# Patient Record
Sex: FEMALE | Race: WHITE | URBAN - METROPOLITAN AREA
[De-identification: names, ages, dates, MRNs, and addresses within clinical notes are randomized per-mention and may not be internally consistent; named-entity substitution may affect disease eponyms.]

---

## 2018-12-08 ENCOUNTER — OFFICE VISIT (OUTPATIENT)
Dept: FAMILY MEDICINE CLINIC | Age: 68
End: 2018-12-08
Payer: MEDICARE

## 2018-12-08 VITALS
WEIGHT: 126 LBS | TEMPERATURE: 97.5 F | HEART RATE: 68 BPM | BODY MASS INDEX: 21.51 KG/M2 | RESPIRATION RATE: 16 BRPM | SYSTOLIC BLOOD PRESSURE: 138 MMHG | HEIGHT: 64 IN | DIASTOLIC BLOOD PRESSURE: 62 MMHG | OXYGEN SATURATION: 98 %

## 2018-12-08 DIAGNOSIS — N30.01 ACUTE CYSTITIS WITH HEMATURIA: ICD-10-CM

## 2018-12-08 DIAGNOSIS — N30.01 ACUTE CYSTITIS WITH HEMATURIA: Primary | ICD-10-CM

## 2018-12-08 LAB
BILIRUBIN, POC: ABNORMAL
BLOOD URINE, POC: ABNORMAL
CLARITY, POC: CLEAR
COLOR, POC: YELLOW
GLUCOSE URINE, POC: ABNORMAL
KETONES, POC: ABNORMAL
LEUKOCYTE EST, POC: ABNORMAL
NITRITE, POC: ABNORMAL
PH, POC: 6
PROTEIN, POC: ABNORMAL
SPECIFIC GRAVITY, POC: 1.03
UROBILINOGEN, POC: ABNORMAL

## 2018-12-08 PROCEDURE — 99213 OFFICE O/P EST LOW 20 MIN: CPT | Performed by: FAMILY MEDICINE

## 2018-12-08 PROCEDURE — 81003 URINALYSIS AUTO W/O SCOPE: CPT | Performed by: FAMILY MEDICINE

## 2018-12-08 RX ORDER — ACETAMINOPHEN 500 MG
500 TABLET ORAL
COMMUNITY

## 2018-12-08 RX ORDER — SULFAMETHOXAZOLE AND TRIMETHOPRIM 800; 160 MG/1; MG/1
1 TABLET ORAL 2 TIMES DAILY
Qty: 10 TABLET | Refills: 0 | Status: SHIPPED | OUTPATIENT
Start: 2018-12-08 | End: 2018-12-13

## 2018-12-08 RX ORDER — ASCORBATE CALCIUM 500 MG
500 TABLET ORAL
COMMUNITY

## 2018-12-08 RX ORDER — CALCIUM POLYCARBOPHIL 625 MG 625 MG/1
625 TABLET ORAL
COMMUNITY
Start: 2018-09-25

## 2018-12-08 RX ORDER — LOSARTAN POTASSIUM 25 MG/1
25 TABLET ORAL
COMMUNITY
Start: 2018-10-24

## 2018-12-08 RX ORDER — RANITIDINE 150 MG/1
150 TABLET ORAL
COMMUNITY
Start: 2018-10-24 | End: 2018-12-08 | Stop reason: ALTCHOICE

## 2018-12-08 RX ORDER — PHENAZOPYRIDINE HYDROCHLORIDE 100 MG/1
100 TABLET, FILM COATED ORAL 3 TIMES DAILY PRN
Qty: 6 TABLET | Refills: 0 | Status: SHIPPED | OUTPATIENT
Start: 2018-12-08 | End: 2019-12-08

## 2018-12-08 RX ORDER — CHOLECALCIFEROL (VITAMIN D3) 125 MCG
5 CAPSULE ORAL
COMMUNITY

## 2018-12-10 LAB
ORGANISM: ABNORMAL
URINE CULTURE, ROUTINE: ABNORMAL
URINE CULTURE, ROUTINE: ABNORMAL

## 2023-10-15 ENCOUNTER — APPOINTMENT (OUTPATIENT)
Dept: CT IMAGING | Age: 73
End: 2023-10-15
Attending: EMERGENCY MEDICINE
Payer: MEDICARE

## 2023-10-15 ENCOUNTER — APPOINTMENT (OUTPATIENT)
Dept: GENERAL RADIOLOGY | Age: 73
End: 2023-10-15
Payer: MEDICARE

## 2023-10-15 ENCOUNTER — HOSPITAL ENCOUNTER (OUTPATIENT)
Age: 73
Setting detail: OBSERVATION
Discharge: HOME HEALTH | End: 2023-10-16
Attending: EMERGENCY MEDICINE | Admitting: INTERNAL MEDICINE
Payer: MEDICARE

## 2023-10-15 DIAGNOSIS — R07.9 CHEST PAIN, UNSPECIFIED TYPE: Primary | ICD-10-CM

## 2023-10-15 LAB
ALBUMIN SERPL-MCNC: 4.9 G/DL (ref 3.5–4.6)
ALP SERPL-CCNC: 97 U/L (ref 40–130)
ALT SERPL-CCNC: 17 U/L (ref 0–33)
ANION GAP SERPL CALCULATED.3IONS-SCNC: 10 MEQ/L (ref 9–15)
APTT PPP: 26.1 SEC (ref 24.4–36.8)
AST SERPL-CCNC: 28 U/L (ref 0–35)
BASOPHILS # BLD: 0 K/UL (ref 0–0.2)
BASOPHILS NFR BLD: 0.3 %
BILIRUB SERPL-MCNC: 0.8 MG/DL (ref 0.2–0.7)
BUN SERPL-MCNC: 14 MG/DL (ref 8–23)
CALCIUM SERPL-MCNC: 9.6 MG/DL (ref 8.5–9.9)
CHLORIDE SERPL-SCNC: 102 MEQ/L (ref 95–107)
CK SERPL-CCNC: 100 U/L (ref 0–170)
CO2 SERPL-SCNC: 27 MEQ/L (ref 20–31)
CREAT SERPL-MCNC: 0.54 MG/DL (ref 0.5–0.9)
D DIMER PPP FEU-MCNC: 0.63 MG/L FEU (ref 0–0.5)
EOSINOPHIL # BLD: 0.1 K/UL (ref 0–0.7)
EOSINOPHIL NFR BLD: 1.7 %
ERYTHROCYTE [DISTWIDTH] IN BLOOD BY AUTOMATED COUNT: 14 % (ref 11.5–14.5)
GLOBULIN SER CALC-MCNC: 2.7 G/DL (ref 2.3–3.5)
GLUCOSE SERPL-MCNC: 133 MG/DL (ref 70–99)
HCT VFR BLD AUTO: 41.9 % (ref 37–47)
HGB BLD-MCNC: 13.3 G/DL (ref 12–16)
INR PPP: 1
LYMPHOCYTES # BLD: 2.4 K/UL (ref 1–4.8)
LYMPHOCYTES NFR BLD: 40.6 %
MCH RBC QN AUTO: 30 PG (ref 27–31.3)
MCHC RBC AUTO-ENTMCNC: 31.7 % (ref 33–37)
MCV RBC AUTO: 94.6 FL (ref 79.4–94.8)
MONOCYTES # BLD: 0.7 K/UL (ref 0.2–0.8)
MONOCYTES NFR BLD: 11.5 %
NEUTROPHILS # BLD: 2.7 K/UL (ref 1.4–6.5)
NEUTS SEG NFR BLD: 45.6 %
PLATELET # BLD AUTO: 235 K/UL (ref 130–400)
POTASSIUM SERPL-SCNC: 3.3 MEQ/L (ref 3.4–4.9)
PROT SERPL-MCNC: 7.6 G/DL (ref 6.3–8)
PROTHROMBIN TIME: 13.2 SEC (ref 12.3–14.9)
RBC # BLD AUTO: 4.43 M/UL (ref 4.2–5.4)
SODIUM SERPL-SCNC: 139 MEQ/L (ref 135–144)
TROPONIN, HIGH SENSITIVITY: 10 NG/L (ref 0–19)
WBC # BLD AUTO: 5.8 K/UL (ref 4.8–10.8)

## 2023-10-15 PROCEDURE — 80053 COMPREHEN METABOLIC PANEL: CPT

## 2023-10-15 PROCEDURE — 71045 X-RAY EXAM CHEST 1 VIEW: CPT

## 2023-10-15 PROCEDURE — 85379 FIBRIN DEGRADATION QUANT: CPT

## 2023-10-15 PROCEDURE — 6360000004 HC RX CONTRAST MEDICATION: Performed by: EMERGENCY MEDICINE

## 2023-10-15 PROCEDURE — G0378 HOSPITAL OBSERVATION PER HR: HCPCS

## 2023-10-15 PROCEDURE — 71275 CT ANGIOGRAPHY CHEST: CPT

## 2023-10-15 PROCEDURE — 85730 THROMBOPLASTIN TIME PARTIAL: CPT

## 2023-10-15 PROCEDURE — 85610 PROTHROMBIN TIME: CPT

## 2023-10-15 PROCEDURE — 6370000000 HC RX 637 (ALT 250 FOR IP): Performed by: INTERNAL MEDICINE

## 2023-10-15 PROCEDURE — 99285 EMERGENCY DEPT VISIT HI MDM: CPT

## 2023-10-15 PROCEDURE — 82550 ASSAY OF CK (CPK): CPT

## 2023-10-15 PROCEDURE — 99223 1ST HOSP IP/OBS HIGH 75: CPT | Performed by: INTERNAL MEDICINE

## 2023-10-15 PROCEDURE — 93005 ELECTROCARDIOGRAM TRACING: CPT | Performed by: EMERGENCY MEDICINE

## 2023-10-15 PROCEDURE — 85025 COMPLETE CBC W/AUTO DIFF WBC: CPT

## 2023-10-15 PROCEDURE — 84484 ASSAY OF TROPONIN QUANT: CPT

## 2023-10-15 PROCEDURE — 36415 COLL VENOUS BLD VENIPUNCTURE: CPT

## 2023-10-15 PROCEDURE — 6370000000 HC RX 637 (ALT 250 FOR IP): Performed by: EMERGENCY MEDICINE

## 2023-10-15 RX ORDER — ASPIRIN 81 MG/1
81 TABLET, CHEWABLE ORAL DAILY
Status: CANCELLED | OUTPATIENT
Start: 2023-10-16

## 2023-10-15 RX ORDER — LOSARTAN POTASSIUM 25 MG/1
25 TABLET ORAL DAILY
Status: DISCONTINUED | OUTPATIENT
Start: 2023-10-16 | End: 2023-10-16 | Stop reason: HOSPADM

## 2023-10-15 RX ORDER — ATORVASTATIN CALCIUM 10 MG/1
10 TABLET, FILM COATED ORAL NIGHTLY
Status: DISCONTINUED | OUTPATIENT
Start: 2023-10-15 | End: 2023-10-16 | Stop reason: HOSPADM

## 2023-10-15 RX ORDER — ASPIRIN 81 MG/1
81 TABLET ORAL DAILY
Status: DISCONTINUED | OUTPATIENT
Start: 2023-10-15 | End: 2023-10-16 | Stop reason: HOSPADM

## 2023-10-15 RX ORDER — NITROGLYCERIN 0.4 MG/1
0.4 TABLET SUBLINGUAL EVERY 5 MIN PRN
Status: CANCELLED | OUTPATIENT
Start: 2023-10-15

## 2023-10-15 RX ORDER — ASPIRIN 81 MG/1
81 TABLET, CHEWABLE ORAL ONCE
Status: COMPLETED | OUTPATIENT
Start: 2023-10-15 | End: 2023-10-15

## 2023-10-15 RX ORDER — ATORVASTATIN CALCIUM 80 MG/1
80 TABLET, FILM COATED ORAL NIGHTLY
Status: CANCELLED | OUTPATIENT
Start: 2023-10-15

## 2023-10-15 RX ORDER — ONDANSETRON 4 MG/1
4 TABLET, ORALLY DISINTEGRATING ORAL EVERY 8 HOURS PRN
Status: CANCELLED | OUTPATIENT
Start: 2023-10-15

## 2023-10-15 RX ORDER — ASCORBIC ACID 500 MG
500 TABLET ORAL DAILY
COMMUNITY

## 2023-10-15 RX ORDER — ONDANSETRON 2 MG/ML
4 INJECTION INTRAMUSCULAR; INTRAVENOUS EVERY 6 HOURS PRN
Status: CANCELLED | OUTPATIENT
Start: 2023-10-15

## 2023-10-15 RX ORDER — SODIUM CHLORIDE 0.9 % (FLUSH) 0.9 %
5-40 SYRINGE (ML) INJECTION PRN
Status: CANCELLED | OUTPATIENT
Start: 2023-10-15

## 2023-10-15 RX ORDER — SODIUM CHLORIDE 0.9 % (FLUSH) 0.9 %
5-40 SYRINGE (ML) INJECTION EVERY 12 HOURS SCHEDULED
Status: CANCELLED | OUTPATIENT
Start: 2023-10-15

## 2023-10-15 RX ORDER — ASCORBATE CALCIUM 500 MG
500 TABLET ORAL DAILY
Status: DISCONTINUED | OUTPATIENT
Start: 2023-10-15 | End: 2023-10-16 | Stop reason: HOSPADM

## 2023-10-15 RX ORDER — ENOXAPARIN SODIUM 100 MG/ML
40 INJECTION SUBCUTANEOUS DAILY
Status: CANCELLED | OUTPATIENT
Start: 2023-10-15

## 2023-10-15 RX ORDER — ACETAMINOPHEN 325 MG/1
650 TABLET ORAL EVERY 6 HOURS PRN
Status: CANCELLED | OUTPATIENT
Start: 2023-10-15

## 2023-10-15 RX ORDER — ACETAMINOPHEN 650 MG/1
650 SUPPOSITORY RECTAL EVERY 6 HOURS PRN
Status: CANCELLED | OUTPATIENT
Start: 2023-10-15

## 2023-10-15 RX ORDER — POLYETHYLENE GLYCOL 3350 17 G/17G
17 POWDER, FOR SOLUTION ORAL DAILY PRN
Status: CANCELLED | OUTPATIENT
Start: 2023-10-15

## 2023-10-15 RX ORDER — ACETAMINOPHEN 325 MG/1
650 TABLET ORAL EVERY 6 HOURS PRN
Status: DISCONTINUED | OUTPATIENT
Start: 2023-10-15 | End: 2023-10-16 | Stop reason: HOSPADM

## 2023-10-15 RX ORDER — ASCORBIC ACID 500 MG
500 TABLET ORAL DAILY
Status: DISCONTINUED | OUTPATIENT
Start: 2023-10-15 | End: 2023-10-16 | Stop reason: HOSPADM

## 2023-10-15 RX ORDER — MECOBALAMIN 5000 MCG
5 TABLET,DISINTEGRATING ORAL NIGHTLY
Status: DISCONTINUED | OUTPATIENT
Start: 2023-10-15 | End: 2023-10-16 | Stop reason: HOSPADM

## 2023-10-15 RX ORDER — SODIUM CHLORIDE 9 MG/ML
INJECTION, SOLUTION INTRAVENOUS PRN
Status: CANCELLED | OUTPATIENT
Start: 2023-10-15

## 2023-10-15 RX ADMIN — IOPAMIDOL 75 ML: 612 INJECTION, SOLUTION INTRAVENOUS at 16:28

## 2023-10-15 RX ADMIN — ASPIRIN 81 MG: 81 TABLET, CHEWABLE ORAL at 14:39

## 2023-10-15 RX ADMIN — ATORVASTATIN CALCIUM 10 MG: 10 TABLET, FILM COATED ORAL at 22:16

## 2023-10-15 RX ADMIN — NITROGLYCERIN 0.5 INCH: 20 OINTMENT TOPICAL at 14:39

## 2023-10-15 RX ADMIN — Medication 5 MG: at 22:16

## 2023-10-15 RX ADMIN — ASPIRIN 81 MG: 81 TABLET, COATED ORAL at 22:16

## 2023-10-15 ASSESSMENT — PAIN - FUNCTIONAL ASSESSMENT
PAIN_FUNCTIONAL_ASSESSMENT: NONE - DENIES PAIN
PAIN_FUNCTIONAL_ASSESSMENT: 0-10

## 2023-10-15 ASSESSMENT — LIFESTYLE VARIABLES
HOW OFTEN DO YOU HAVE A DRINK CONTAINING ALCOHOL: 2-4 TIMES A MONTH
HOW MANY STANDARD DRINKS CONTAINING ALCOHOL DO YOU HAVE ON A TYPICAL DAY: 3 OR 4
HOW MANY STANDARD DRINKS CONTAINING ALCOHOL DO YOU HAVE ON A TYPICAL DAY: 3 OR 4
HOW OFTEN DO YOU HAVE A DRINK CONTAINING ALCOHOL: 2-4 TIMES A MONTH

## 2023-10-15 ASSESSMENT — ENCOUNTER SYMPTOMS
NAUSEA: 0
ABDOMINAL PAIN: 0
SHORTNESS OF BREATH: 0
RHINORRHEA: 0
ALLERGIC/IMMUNOLOGIC NEGATIVE: 1
VOMITING: 0
CHEST TIGHTNESS: 1
STRIDOR: 0
EYES NEGATIVE: 1
WHEEZING: 0

## 2023-10-15 ASSESSMENT — PAIN DESCRIPTION - DESCRIPTORS: DESCRIPTORS: HEAVINESS

## 2023-10-15 ASSESSMENT — PAIN DESCRIPTION - LOCATION: LOCATION: CHEST

## 2023-10-15 ASSESSMENT — PAIN SCALES - GENERAL
PAINLEVEL_OUTOF10: 5
PAINLEVEL_OUTOF10: 0

## 2023-10-15 NOTE — FLOWSHEET NOTE
68 yr old female received from ER with dx CP. Pt denies pain at this time. She is alert and oriented, follows commands, responds appropriately. Respirations even and nonlabored on RA. Admission completed without problem. Primary service in to see pt.

## 2023-10-15 NOTE — ED PROVIDER NOTES
[facility] ED  eMERGENCY dEPARTMENT eNCOUnter      Pt Name: Kathi Huffman  MRN: [id number]  [address] 1950  Date of evaluation: 10/15/2023  Provider: Teodoro Florentino MD    CHIEF COMPLAINT       Chief Complaint   Patient presents with    Chest Pain     Heaviness that started a few hours ago         HISTORY OF PRESENT ILLNESS   (Location/Symptom, Timing/Onset,Context/Setting, Quality, Duration, Modifying Factors, Severity)  Note limiting factors. Kathi Huffman is a 68 y.o. female who presents to the emergency department as a general lysed chest heaviness. Patient admits she seemed to notice it a few days ago and did mention it to her primary care physician. Patient admits that she is slated for stress testing next month. Patient admits however today a few hours ago she began with significant increased chest pressure and generalized heaviness. Patient denies that it is pain. Patient denies radiation. Patient denies shortness of breath, nausea or vomiting at this time. There is been no recent fevers or chills or other constitutional symptomatologies. HPI    NursingNotes were reviewed. REVIEW OF SYSTEMS    (2-9 systems for level 4, 10 or more for level 5)     Review of Systems   Constitutional:  Negative for activity change, chills and fever. HENT:  Negative for congestion, ear pain and rhinorrhea. Eyes: Negative. Respiratory:  Positive for chest tightness. Negative for shortness of breath, wheezing and stridor. Cardiovascular:  Positive for chest pain. Negative for leg swelling. Gastrointestinal:  Negative for abdominal pain, nausea and vomiting. Endocrine: Negative. Genitourinary:  Negative for dysuria and frequency. Musculoskeletal:  Negative for gait problem and neck pain. Skin: Negative. Allergic/Immunologic: Negative. Neurological:  Negative for seizures, syncope and light-headedness. Hematological: Negative.         Except as noted

## 2023-10-15 NOTE — ED NOTES
Page to [facility] @ 1282  Dr Flo Gracia returned call @ 025 [address] [place], [place]  10/15/23 3845

## 2023-10-15 NOTE — ED TRIAGE NOTES
Cp that started a few hours ago (heaviness)  Pt states her pain is a 5/10  Pt has a steady gait   Pt is afebrile

## 2023-10-15 NOTE — ED NOTES
Critical taken D-Dimer elevated 0.63  Dr. Donavon Morales aware      Toribio Calderon, ELIUD  10/15/23 5394

## 2023-10-16 ENCOUNTER — APPOINTMENT (OUTPATIENT)
Dept: NUCLEAR MEDICINE | Age: 73
End: 2023-10-16
Payer: MEDICARE

## 2023-10-16 ENCOUNTER — APPOINTMENT (OUTPATIENT)
Age: 73
End: 2023-10-16
Payer: MEDICARE

## 2023-10-16 VITALS
RESPIRATION RATE: 20 BRPM | OXYGEN SATURATION: 98 % | WEIGHT: 117 LBS | TEMPERATURE: 97.7 F | BODY MASS INDEX: 20.73 KG/M2 | HEIGHT: 63 IN | DIASTOLIC BLOOD PRESSURE: 71 MMHG | SYSTOLIC BLOOD PRESSURE: 158 MMHG | HEART RATE: 63 BPM

## 2023-10-16 PROBLEM — R07.2 PRECORDIAL CHEST PAIN: Status: ACTIVE | Noted: 2023-10-15

## 2023-10-16 LAB
ECHO BSA: 1.54 M2
EKG ATRIAL RATE: 74 BPM
EKG P AXIS: 82 DEGREES
EKG P-R INTERVAL: 168 MS
EKG Q-T INTERVAL: 412 MS
EKG QRS DURATION: 90 MS
EKG QTC CALCULATION (BAZETT): 457 MS
EKG R AXIS: 48 DEGREES
EKG T AXIS: 61 DEGREES
EKG VENTRICULAR RATE: 74 BPM
NUC STRESS EJECTION FRACTION: 74 %
STRESS BASELINE DIAS BP: 79 MMHG
STRESS BASELINE HR: 75 BPM
STRESS BASELINE ST DEPRESSION: 0 MM
STRESS BASELINE SYS BP: 170 MMHG
STRESS ESTIMATED WORKLOAD: 1 METS
STRESS PEAK DIAS BP: 82 MMHG
STRESS PEAK SYS BP: 174 MMHG
STRESS PERCENT HR ACHIEVED: 68 %
STRESS POST PEAK HR: 100 BPM
STRESS RATE PRESSURE PRODUCT: NORMAL BPM*MMHG
STRESS ST DEPRESSION: 0 MM
STRESS TARGET HR: 148 BPM
TID: 1.17

## 2023-10-16 PROCEDURE — 2580000003 HC RX 258: Performed by: INTERNAL MEDICINE

## 2023-10-16 PROCEDURE — 6370000000 HC RX 637 (ALT 250 FOR IP): Performed by: INTERNAL MEDICINE

## 2023-10-16 PROCEDURE — APPSS30 APP SPLIT SHARED TIME 16-30 MINUTES: Performed by: PHYSICIAN ASSISTANT

## 2023-10-16 PROCEDURE — G0378 HOSPITAL OBSERVATION PER HR: HCPCS

## 2023-10-16 PROCEDURE — 3430000000 HC RX DIAGNOSTIC RADIOPHARMACEUTICAL: Performed by: INTERNAL MEDICINE

## 2023-10-16 PROCEDURE — A9502 TC99M TETROFOSMIN: HCPCS | Performed by: INTERNAL MEDICINE

## 2023-10-16 PROCEDURE — 6360000002 HC RX W HCPCS: Performed by: INTERNAL MEDICINE

## 2023-10-16 PROCEDURE — 93010 ELECTROCARDIOGRAM REPORT: CPT | Performed by: INTERNAL MEDICINE

## 2023-10-16 PROCEDURE — 93017 CV STRESS TEST TRACING ONLY: CPT

## 2023-10-16 PROCEDURE — 99239 HOSP IP/OBS DSCHRG MGMT >30: CPT | Performed by: INTERNAL MEDICINE

## 2023-10-16 PROCEDURE — 78452 HT MUSCLE IMAGE SPECT MULT: CPT

## 2023-10-16 PROCEDURE — 93018 CV STRESS TEST I&R ONLY: CPT | Performed by: INTERNAL MEDICINE

## 2023-10-16 RX ORDER — SODIUM CHLORIDE 0.9 % (FLUSH) 0.9 %
10 SYRINGE (ML) INJECTION PRN
Status: DISCONTINUED | OUTPATIENT
Start: 2023-10-16 | End: 2023-10-16 | Stop reason: HOSPADM

## 2023-10-16 RX ORDER — ASPIRIN 81 MG/1
81 TABLET ORAL DAILY
Qty: 30 TABLET | Refills: 3 | Status: SHIPPED | OUTPATIENT
Start: 2023-10-17

## 2023-10-16 RX ORDER — REGADENOSON 0.08 MG/ML
0.4 INJECTION, SOLUTION INTRAVENOUS
Status: COMPLETED | OUTPATIENT
Start: 2023-10-16 | End: 2023-10-16

## 2023-10-16 RX ADMIN — LOSARTAN POTASSIUM 25 MG: 25 TABLET, FILM COATED ORAL at 11:58

## 2023-10-16 RX ADMIN — TETROFOSMIN 11.4 MILLICURIE: 1.38 INJECTION, POWDER, LYOPHILIZED, FOR SOLUTION INTRAVENOUS at 08:35

## 2023-10-16 RX ADMIN — OXYCODONE HYDROCHLORIDE AND ACETAMINOPHEN 500 MG: 500 TABLET ORAL at 11:58

## 2023-10-16 RX ADMIN — Medication 10 ML: at 08:35

## 2023-10-16 RX ADMIN — ASPIRIN 81 MG: 81 TABLET, COATED ORAL at 11:58

## 2023-10-16 RX ADMIN — TETROFOSMIN 35 MILLICURIE: 1.38 INJECTION, POWDER, LYOPHILIZED, FOR SOLUTION INTRAVENOUS at 09:25

## 2023-10-16 RX ADMIN — REGADENOSON 0.4 MG: 0.08 INJECTION, SOLUTION INTRAVENOUS at 09:25

## 2023-10-16 RX ADMIN — Medication 10 ML: at 09:25

## 2023-10-16 RX ADMIN — Medication 10 ML: at 09:26

## 2023-10-16 ASSESSMENT — PAIN SCALES - GENERAL: PAINLEVEL_OUTOF10: 0

## 2023-10-16 NOTE — PLAN OF CARE
Problem: Discharge Planning  Goal: Discharge to home or other facility with appropriate resources  Outcome: Progressing  Flowsheets (Taken 10/15/2023 1717 by Elspeth Kayser, RN)  Discharge to home or other facility with appropriate resources: Identify barriers to discharge with patient and caregiver     Problem: Safety - Adult  Goal: Free from fall injury  Outcome: Progressing     Problem: Pain  Goal: Verbalizes/displays adequate comfort level or baseline comfort level  Outcome: Progressing  Flowsheets (Taken 10/16/2023 0431)  Verbalizes/displays adequate comfort level or baseline comfort level:   Encourage patient to monitor pain and request assistance   Assess pain using appropriate pain scale   Administer analgesics based on type and severity of pain and evaluate response     Problem: Skin/Tissue Integrity  Goal: Absence of new skin breakdown  Description: 1. Monitor for areas of redness and/or skin breakdown  2. Assess vascular access sites hourly  3. Every 4-6 hours minimum:  Change oxygen saturation probe site  4. Every 4-6 hours:  If on nasal continuous positive airway pressure, respiratory therapy assess nares and determine need for appliance change or resting period.   Outcome: Progressing

## 2023-10-16 NOTE — DISCHARGE SUMMARY
Stress Systolic  mmHg    Stress Diastolic BP 82 mmHg    Stress Peak  BPM    Stress Estimated Workload 1.0 METS    Stress Percent HR Achieved 68 %    Stress Rate Pressure Product 17,400 BPM*mmHg    Body Surface Area 1.54 m2         Telemetry 10/16/23: SR 70s    Follow-up visits:   Dylan Sandoval DO  [address] [address]  [phone]    Schedule an appointment as soon as possible for a visit in 4 week(s)  Call to schedule cardiology follow-up       Assessment:  Active Hospital Problems    Diagnosis Date Noted    Chest pain [R07.9] 10/15/2023     Precordial pain/chest heaviness  Abnormal ECG  Hypertension  Hyperlipidemia  Family history of heart disease  Remote history of tobacco abuse    Plan:   1. Await result of stress test today. If negative, plan to discharge home with outpatient follow-up  2. Continue current medications  3. Cardiac diet recommended  4.   Follow-up with cardiology, Dr. Addison Ferguson in 4 weeks upon discharge        Electronically signed by Yobany Ashby 10/16/2023 at 3:19 PM

## 2023-10-16 NOTE — H&P
ECG  Hypertension  Hyperlipidemia  Family history of heart disease  Remote history of tobacco abuse    Plan:   Admit to observation  Trend high-sensitivity troponin, negative x2. Aspirin 81 mg daily. Home medications. Lexiscan Myoview stress test tomorrow. If negative, she can be discharged home. If abnormal, then cardiac catheterization is warranted.       Candy Sharif DO, Catherine Boot

## 2023-10-16 NOTE — CARE COORDINATION
Case Management Assessment  Initial Evaluation    Date/Time of Evaluation: 10/16/2023 4:22 PM  Assessment Completed by: Shawanda Perez RN    If patient is discharged prior to next notation, then this note serves as note for discharge by case management. Patient Name: Kelli Koenig                   YOB: 1950  Diagnosis: Chest pain [R07.9]  Chest pain, unspecified type [R07.9]                   Date / Time: 10/15/2023  2:06 PM    Patient Admission Status: Observation   Readmission Risk (Low < 19, Mod (19-27), High > 27): No data recorded  Current PCP: Nico Swain, DO  PCP verified by CM? Yes    Chart Reviewed: Yes      History Provided by: Medical Record  Patient Orientation: Alert and Oriented, Person, Place, Situation, Self    Patient Cognition: Alert    Hospitalization in the last 30 days (Readmission):  No    If yes, Readmission Assessment in  Navigator will be completed. Advance Directives:      Code Status: Not on file   Patient's Primary Decision Maker is: Legal Next of Kin    Primary Decision MakerPatrkarthik Tillman - Daughter-in-Law - [phone]    Discharge Planning:    Patient lives with: Alone Type of Home: House  Primary Care Giver: Self  Patient Support Systems include: Children   Current Financial resources:    Current community resources:    Current services prior to admission: None            Current DME:              Type of Home Care services:  None    ADLS  Prior functional level: Independent in ADLs/IADLs  Current functional level: Independent in ADLs/IADLs    PT AM-PAC:   /24  OT AM-PAC:   /24    Family can provide assistance at DC: Yes  Would you like Case Management to discuss the discharge plan with any other family members/significant others, and if so, who?  No  Plans to Return to Present Housing: Yes  Other Identified Issues/Barriers to RETURNING to current housing:   Potential Assistance needed at discharge: N/A            Potential DME:    Patient

## 2024-09-10 NOTE — PROGRESS NOTES
patient's medical history in detail and updated the computerized patient record. More than 50% of the appointment was spent in face-to-face counseling, education and care coordination. Please note this report has been partially produced using speech recognition software and may contain mistakes related to that system including errors in grammar, punctuation and spelling as well as words and phrases that may seem inappropriate. If there are questions or concerns, please feel free to contact me to clarify. Orders Placed This Encounter   Procedures    POCT Urinalysis No Micro (Auto)     No orders of the defined types were placed in this encounter. Medications Discontinued During This Encounter   Medication Reason    ranitidine (ZANTAC) 150 MG tablet Therapy completed    ciclopirox (PENLAC) 8 % solution Therapy completed     No Follow-up on file. Controlled Substances Monitoring:     No flowsheet data found.     Suman Rodriguez MD [[id number]]

## 2024-11-18 ENCOUNTER — APPOINTMENT (OUTPATIENT)
Dept: CT IMAGING | Age: 74
End: 2024-11-18
Payer: MEDICARE

## 2024-11-18 ENCOUNTER — HOSPITAL ENCOUNTER (INPATIENT)
Age: 74
LOS: 4 days | Discharge: NURSING, REHAB OR LONG-TERM CARE | End: 2024-11-22
Attending: EMERGENCY MEDICINE | Admitting: INTERNAL MEDICINE
Payer: MEDICARE

## 2024-11-18 ENCOUNTER — APPOINTMENT (OUTPATIENT)
Dept: GENERAL RADIOLOGY | Age: 74
End: 2024-11-18
Payer: MEDICARE

## 2024-11-18 DIAGNOSIS — R29.898 RIGHT LEG WEAKNESS: ICD-10-CM

## 2024-11-18 DIAGNOSIS — I61.9 HEMORRHAGIC CEREBROVASCULAR ACCIDENT (CVA) (HCC): Primary | ICD-10-CM

## 2024-11-18 PROBLEM — I62.9 INTRACRANIAL HEMORRHAGE (HCC): Status: ACTIVE | Noted: 2024-11-18

## 2024-11-18 LAB
ALBUMIN SERPL-MCNC: 5 G/DL (ref 3.5–4.6)
ALP SERPL-CCNC: 100 U/L (ref 40–130)
ALT SERPL-CCNC: 31 U/L (ref 0–33)
ANION GAP SERPL CALCULATED.3IONS-SCNC: 15 MEQ/L (ref 9–15)
APTT PPP: 24.8 SEC (ref 24.4–36.8)
AST SERPL-CCNC: 48 U/L (ref 0–35)
BASOPHILS # BLD: 0 K/UL (ref 0–0.2)
BASOPHILS NFR BLD: 0.1 %
BILIRUB SERPL-MCNC: 0.6 MG/DL (ref 0.2–0.7)
BILIRUB UR QL STRIP: NEGATIVE
BUN SERPL-MCNC: 9 MG/DL (ref 8–23)
CALCIUM SERPL-MCNC: 9.7 MG/DL (ref 8.5–9.9)
CHLORIDE SERPL-SCNC: 100 MEQ/L (ref 95–107)
CK SERPL-CCNC: 223 U/L (ref 0–170)
CLARITY UR: CLEAR
CO2 SERPL-SCNC: 25 MEQ/L (ref 20–31)
COLOR UR: YELLOW
CREAT SERPL-MCNC: 0.41 MG/DL (ref 0.5–0.9)
EOSINOPHIL # BLD: 0 K/UL (ref 0–0.7)
EOSINOPHIL NFR BLD: 0.2 %
ERYTHROCYTE [DISTWIDTH] IN BLOOD BY AUTOMATED COUNT: 13.1 % (ref 11.5–14.5)
GLOBULIN SER CALC-MCNC: 2.5 G/DL (ref 2.3–3.5)
GLUCOSE BLD-MCNC: 97 MG/DL (ref 70–99)
GLUCOSE SERPL-MCNC: 90 MG/DL (ref 70–99)
GLUCOSE UR STRIP-MCNC: NEGATIVE MG/DL
HCT VFR BLD AUTO: 39.7 % (ref 37–47)
HGB BLD-MCNC: 13.3 G/DL (ref 12–16)
HGB UR QL STRIP: NEGATIVE
INR PPP: 0.8
KETONES UR STRIP-MCNC: NEGATIVE MG/DL
LEUKOCYTE ESTERASE UR QL STRIP: NEGATIVE
LYMPHOCYTES # BLD: 1 K/UL (ref 1–4.8)
LYMPHOCYTES NFR BLD: 11.1 %
MAGNESIUM SERPL-MCNC: 1.8 MG/DL (ref 1.7–2.4)
MCH RBC QN AUTO: 30.6 PG (ref 27–31.3)
MCHC RBC AUTO-ENTMCNC: 33.5 % (ref 33–37)
MCV RBC AUTO: 91.3 FL (ref 79.4–94.8)
MONOCYTES # BLD: 0.9 K/UL (ref 0.2–0.8)
MONOCYTES NFR BLD: 9.8 %
NEUTROPHILS # BLD: 6.8 K/UL (ref 1.4–6.5)
NEUTS SEG NFR BLD: 78.5 %
NITRITE UR QL STRIP: NEGATIVE
PERFORMED ON: NORMAL
PERFORMED ON: NORMAL
PH UR STRIP: 7.5 [PH] (ref 5–9)
PLATELET # BLD AUTO: 215 K/UL (ref 130–400)
POC CREATININE: 0.6 MG/DL (ref 0.6–1.2)
POC SAMPLE TYPE: NORMAL
POTASSIUM SERPL-SCNC: 3.7 MEQ/L (ref 3.4–4.9)
PROT SERPL-MCNC: 7.5 G/DL (ref 6.3–8)
PROT UR STRIP-MCNC: NEGATIVE MG/DL
PROTHROMBIN TIME: 11.7 SEC (ref 12.3–14.9)
RBC # BLD AUTO: 4.35 M/UL (ref 4.2–5.4)
SODIUM SERPL-SCNC: 140 MEQ/L (ref 135–144)
SP GR UR STRIP: 1.02 (ref 1–1.03)
TROPONIN, HIGH SENSITIVITY: 10 NG/L (ref 0–19)
TROPONIN, HIGH SENSITIVITY: 9 NG/L (ref 0–19)
URINE REFLEX TO CULTURE: NORMAL
UROBILINOGEN UR STRIP-ACNC: 0.2 E.U./DL
WBC # BLD AUTO: 8.6 K/UL (ref 4.8–10.8)

## 2024-11-18 PROCEDURE — 71045 X-RAY EXAM CHEST 1 VIEW: CPT

## 2024-11-18 PROCEDURE — 93005 ELECTROCARDIOGRAM TRACING: CPT | Performed by: EMERGENCY MEDICINE

## 2024-11-18 PROCEDURE — 84484 ASSAY OF TROPONIN QUANT: CPT

## 2024-11-18 PROCEDURE — 70450 CT HEAD/BRAIN W/O DYE: CPT

## 2024-11-18 PROCEDURE — 99223 1ST HOSP IP/OBS HIGH 75: CPT | Performed by: PSYCHIATRY & NEUROLOGY

## 2024-11-18 PROCEDURE — 73030 X-RAY EXAM OF SHOULDER: CPT

## 2024-11-18 PROCEDURE — 72131 CT LUMBAR SPINE W/O DYE: CPT

## 2024-11-18 PROCEDURE — 99291 CRITICAL CARE FIRST HOUR: CPT | Performed by: INTERNAL MEDICINE

## 2024-11-18 PROCEDURE — 99222 1ST HOSP IP/OBS MODERATE 55: CPT | Performed by: NEUROLOGICAL SURGERY

## 2024-11-18 PROCEDURE — 70496 CT ANGIOGRAPHY HEAD: CPT

## 2024-11-18 PROCEDURE — 80053 COMPREHEN METABOLIC PANEL: CPT

## 2024-11-18 PROCEDURE — 85610 PROTHROMBIN TIME: CPT

## 2024-11-18 PROCEDURE — 85025 COMPLETE CBC W/AUTO DIFF WBC: CPT

## 2024-11-18 PROCEDURE — 6360000002 HC RX W HCPCS: Performed by: INTERNAL MEDICINE

## 2024-11-18 PROCEDURE — 71260 CT THORAX DX C+: CPT

## 2024-11-18 PROCEDURE — 72128 CT CHEST SPINE W/O DYE: CPT

## 2024-11-18 PROCEDURE — 6370000000 HC RX 637 (ALT 250 FOR IP): Performed by: INTERNAL MEDICINE

## 2024-11-18 PROCEDURE — 83735 ASSAY OF MAGNESIUM: CPT

## 2024-11-18 PROCEDURE — 81003 URINALYSIS AUTO W/O SCOPE: CPT

## 2024-11-18 PROCEDURE — 36415 COLL VENOUS BLD VENIPUNCTURE: CPT

## 2024-11-18 PROCEDURE — 82550 ASSAY OF CK (CPK): CPT

## 2024-11-18 PROCEDURE — 2580000003 HC RX 258: Performed by: INTERNAL MEDICINE

## 2024-11-18 PROCEDURE — 85730 THROMBOPLASTIN TIME PARTIAL: CPT

## 2024-11-18 PROCEDURE — 99285 EMERGENCY DEPT VISIT HI MDM: CPT

## 2024-11-18 PROCEDURE — 6360000002 HC RX W HCPCS: Performed by: EMERGENCY MEDICINE

## 2024-11-18 PROCEDURE — 70486 CT MAXILLOFACIAL W/O DYE: CPT

## 2024-11-18 PROCEDURE — 6360000004 HC RX CONTRAST MEDICATION: Performed by: EMERGENCY MEDICINE

## 2024-11-18 PROCEDURE — 2000000000 HC ICU R&B

## 2024-11-18 RX ORDER — ATORVASTATIN CALCIUM 20 MG/1
20 TABLET, FILM COATED ORAL DAILY
Status: DISCONTINUED | OUTPATIENT
Start: 2024-11-18 | End: 2024-11-22 | Stop reason: HOSPADM

## 2024-11-18 RX ORDER — ASCORBIC ACID 500 MG
500 TABLET ORAL DAILY
Status: DISCONTINUED | OUTPATIENT
Start: 2024-11-18 | End: 2024-11-22 | Stop reason: HOSPADM

## 2024-11-18 RX ORDER — ONDANSETRON 2 MG/ML
4 INJECTION INTRAMUSCULAR; INTRAVENOUS EVERY 6 HOURS PRN
Status: DISCONTINUED | OUTPATIENT
Start: 2024-11-18 | End: 2024-11-22 | Stop reason: HOSPADM

## 2024-11-18 RX ORDER — LOSARTAN POTASSIUM 100 MG/1
100 TABLET ORAL DAILY
Status: DISCONTINUED | OUTPATIENT
Start: 2024-11-19 | End: 2024-11-22 | Stop reason: HOSPADM

## 2024-11-18 RX ORDER — ATORVASTATIN CALCIUM 20 MG/1
20 TABLET, FILM COATED ORAL DAILY
Status: ON HOLD | COMMUNITY

## 2024-11-18 RX ORDER — ACETAMINOPHEN 325 MG/1
650 TABLET ORAL EVERY 6 HOURS PRN
Status: DISCONTINUED | OUTPATIENT
Start: 2024-11-18 | End: 2024-11-22 | Stop reason: HOSPADM

## 2024-11-18 RX ORDER — MECOBALAMIN 5000 MCG
5 TABLET,DISINTEGRATING ORAL NIGHTLY
Status: DISCONTINUED | OUTPATIENT
Start: 2024-11-18 | End: 2024-11-22 | Stop reason: HOSPADM

## 2024-11-18 RX ORDER — NICARDIPINE HYDROCHLORIDE 0.1 MG/ML
2.5-15 INJECTION INTRAVENOUS CONTINUOUS
Status: DISCONTINUED | OUTPATIENT
Start: 2024-11-18 | End: 2024-11-20

## 2024-11-18 RX ORDER — ONDANSETRON 4 MG/1
4 TABLET, ORALLY DISINTEGRATING ORAL EVERY 8 HOURS PRN
Status: DISCONTINUED | OUTPATIENT
Start: 2024-11-18 | End: 2024-11-22 | Stop reason: HOSPADM

## 2024-11-18 RX ORDER — ACETAMINOPHEN 650 MG/1
650 SUPPOSITORY RECTAL EVERY 6 HOURS PRN
Status: DISCONTINUED | OUTPATIENT
Start: 2024-11-18 | End: 2024-11-22 | Stop reason: HOSPADM

## 2024-11-18 RX ORDER — LEVETIRACETAM 500 MG/5ML
1000 INJECTION, SOLUTION, CONCENTRATE INTRAVENOUS ONCE
Status: COMPLETED | OUTPATIENT
Start: 2024-11-18 | End: 2024-11-18

## 2024-11-18 RX ORDER — LABETALOL HYDROCHLORIDE 5 MG/ML
10 INJECTION, SOLUTION INTRAVENOUS EVERY 10 MIN PRN
Status: DISCONTINUED | OUTPATIENT
Start: 2024-11-18 | End: 2024-11-22 | Stop reason: HOSPADM

## 2024-11-18 RX ORDER — ASCORBATE CALCIUM 500 MG
500 TABLET ORAL DAILY
Status: DISCONTINUED | OUTPATIENT
Start: 2024-11-18 | End: 2024-11-18 | Stop reason: SDUPTHER

## 2024-11-18 RX ORDER — SODIUM CHLORIDE 0.9 % (FLUSH) 0.9 %
5-40 SYRINGE (ML) INJECTION PRN
Status: DISCONTINUED | OUTPATIENT
Start: 2024-11-18 | End: 2024-11-22 | Stop reason: HOSPADM

## 2024-11-18 RX ORDER — SODIUM CHLORIDE 0.9 % (FLUSH) 0.9 %
5-40 SYRINGE (ML) INJECTION EVERY 12 HOURS SCHEDULED
Status: DISCONTINUED | OUTPATIENT
Start: 2024-11-18 | End: 2024-11-22 | Stop reason: HOSPADM

## 2024-11-18 RX ORDER — SODIUM CHLORIDE 9 MG/ML
INJECTION, SOLUTION INTRAVENOUS PRN
Status: DISCONTINUED | OUTPATIENT
Start: 2024-11-18 | End: 2024-11-22 | Stop reason: HOSPADM

## 2024-11-18 RX ORDER — LEVETIRACETAM 500 MG/5ML
500 INJECTION, SOLUTION, CONCENTRATE INTRAVENOUS EVERY 12 HOURS
Status: DISCONTINUED | OUTPATIENT
Start: 2024-11-18 | End: 2024-11-19

## 2024-11-18 RX ORDER — IOPAMIDOL 755 MG/ML
150 INJECTION, SOLUTION INTRAVASCULAR
Status: COMPLETED | OUTPATIENT
Start: 2024-11-18 | End: 2024-11-18

## 2024-11-18 RX ADMIN — LABETALOL HYDROCHLORIDE 10 MG: 5 INJECTION, SOLUTION INTRAVENOUS at 14:09

## 2024-11-18 RX ADMIN — LEVETIRACETAM 1000 MG: 100 INJECTION INTRAVENOUS at 11:10

## 2024-11-18 RX ADMIN — ATORVASTATIN CALCIUM 20 MG: 20 TABLET, FILM COATED ORAL at 21:14

## 2024-11-18 RX ADMIN — NICARDIPINE HYDROCHLORIDE 5 MG/HR: 0.1 INJECTION INTRAVENOUS at 20:46

## 2024-11-18 RX ADMIN — Medication 5 MG: at 21:12

## 2024-11-18 RX ADMIN — LABETALOL HYDROCHLORIDE 10 MG: 5 INJECTION, SOLUTION INTRAVENOUS at 14:24

## 2024-11-18 RX ADMIN — SODIUM CHLORIDE, PRESERVATIVE FREE 10 ML: 5 INJECTION INTRAVENOUS at 21:00

## 2024-11-18 RX ADMIN — NICARDIPINE HYDROCHLORIDE 5 MG/HR: 0.1 INJECTION INTRAVENOUS at 16:43

## 2024-11-18 RX ADMIN — LEVETIRACETAM 500 MG: 100 INJECTION INTRAVENOUS at 23:56

## 2024-11-18 RX ADMIN — IOPAMIDOL 150 ML: 755 INJECTION, SOLUTION INTRAVENOUS at 09:51

## 2024-11-18 RX ADMIN — ACETAMINOPHEN 325MG 650 MG: 325 TABLET ORAL at 14:25

## 2024-11-18 RX ADMIN — ACETAMINOPHEN 325MG 650 MG: 325 TABLET ORAL at 21:13

## 2024-11-18 RX ADMIN — LABETALOL HYDROCHLORIDE 10 MG: 5 INJECTION, SOLUTION INTRAVENOUS at 13:44

## 2024-11-18 ASSESSMENT — PAIN DESCRIPTION - ORIENTATION: ORIENTATION: RIGHT

## 2024-11-18 ASSESSMENT — LIFESTYLE VARIABLES
HOW MANY STANDARD DRINKS CONTAINING ALCOHOL DO YOU HAVE ON A TYPICAL DAY: 3 OR 4
HOW OFTEN DO YOU HAVE A DRINK CONTAINING ALCOHOL: 2-4 TIMES A MONTH

## 2024-11-18 ASSESSMENT — ENCOUNTER SYMPTOMS
BACK PAIN: 0
EYE PAIN: 0
COLOR CHANGE: 0
PHOTOPHOBIA: 0
NAUSEA: 0
CHOKING: 0
VOMITING: 0
SORE THROAT: 0
BACK PAIN: 1
SHORTNESS OF BREATH: 0
TROUBLE SWALLOWING: 0
ABDOMINAL PAIN: 0
CHEST TIGHTNESS: 0

## 2024-11-18 ASSESSMENT — PAIN SCALES - GENERAL
PAINLEVEL_OUTOF10: 1
PAINLEVEL_OUTOF10: 0
PAINLEVEL_OUTOF10: 0
PAINLEVEL_OUTOF10: 5
PAINLEVEL_OUTOF10: 8

## 2024-11-18 ASSESSMENT — PAIN DESCRIPTION - LOCATION
LOCATION: SHOULDER;BACK
LOCATION: OTHER (COMMENT)

## 2024-11-18 ASSESSMENT — PAIN - FUNCTIONAL ASSESSMENT
PAIN_FUNCTIONAL_ASSESSMENT: ACTIVITIES ARE NOT PREVENTED
PAIN_FUNCTIONAL_ASSESSMENT: 0-10

## 2024-11-18 ASSESSMENT — PAIN DESCRIPTION - DESCRIPTORS: DESCRIPTORS: BURNING

## 2024-11-18 ASSESSMENT — PAIN DESCRIPTION - PAIN TYPE: TYPE: ACUTE PAIN

## 2024-11-18 NOTE — CONSULTS
Subjective:      Patient ID: Anahi Willett is a 74 y.o. female who presents today for intracerebral hemorrhage.    HPI 74-year-old right-handed female with a intracerebral hemorrhage.  Patient was sitting and watching TV and got up from the couch and the [employer] if she remembers.  She fell on the floor.  She hit her eye and will had some bleeding.  Upon awakening she did not have any other confusion though she was alone and we are not quite sure.  She then called her daughter.  Patient has history of hypertension though well-managed.  She actually is very active and exercises and does not any other risk factors.    Review of Systems   Constitutional:  Negative for fever.   HENT:  Negative for ear pain, tinnitus and trouble swallowing.    Eyes:  Negative for photophobia and visual disturbance.   Respiratory:  Negative for choking and shortness of breath.    Cardiovascular:  Negative for chest pain and palpitations.   Gastrointestinal:  Negative for nausea and vomiting.   Musculoskeletal:  Negative for back pain, gait problem, joint swelling, myalgias, neck pain and neck stiffness.   Skin:  Negative for color change.   Allergic/Immunologic: Negative for food allergies.   Neurological:  Positive for weakness. Negative for dizziness, tremors, seizures, syncope, facial asymmetry, speech difficulty, light-headedness, numbness and headaches.   Psychiatric/Behavioral:  Negative for behavioral problems, confusion, hallucinations and sleep disturbance.        Past Medical History:   Diagnosis Date    Cervical cancer (HCC)     COPD (chronic obstructive pulmonary disease) (HCC)     Hypertension      Past Surgical History:   Procedure Laterality Date    APPENDECTOMY      CARPAL TUNNEL RELEASE Left     HYSTERECTOMY (CERVIX STATUS UNKNOWN)       Social History     Socioeconomic History    Marital status:      Spouse name: Not on file    Number of children: Not on file    Years of education: Not on file    Highest  sign absent on the left side.      Comments: Notable complete paralysis of the right lower extremity.   Psychiatric:         Mood and Affect: Mood normal.         CT HEAD WO CONTRAST    Result Date: 11/18/2024  EXAMINATION: CT OF THE HEAD WITHOUT CONTRAST 11/18/2024 1:52 pm TECHNIQUE: CT of the head was performed without the administration of intravenous contrast. Automated exposure control, iterative reconstruction, and/or weight based adjustment of the mA/kV was utilized to reduce the radiation dose to as low as reasonably achievable. COMPARISON: CT brain chata Pulliam4 at 0815 hours. HISTORY: ORDERING SYSTEM PROVIDED HISTORY: hemorrhagic stroke, headache or trauma TECHNOLOGIST PROVIDED HISTORY: Has a \"code stroke\" or \"stroke alert\" been called?->Yes Reason for exam:->hemorrhagic stroke What reading provider will be dictating this exam?->CRC FINDINGS: There is stable 2.0 cm acute well-circumscribed ovoid shaped intraparenchymal hematoma along the anterosuperior left frontal lobe seen best on image 41 of series 601.  There is stable mild surrounding cytotoxic edema.  There is no midline shift.  No new areas of acute intra or extra-axial hemorrhage are noted.  Ventricular caliber is normal. Paranasal sinuses in the mastoid air cells are well pneumatized.  The calvarium is intact.     1. Stable 2 cm acute superior left frontal lobe intraparenchymal hematoma with grossly stable surrounding cytotoxic edema.  No appreciable localized mass effect or midline shift.     CTA HEAD W WO CONTRAST    Result Date: 11/18/2024  EXAMINATION: CTA OF THE HEAD WITHOUT AND WITH CONTRAST, 11/18/2024 9:37 am TECHNIQUE: CTA of the head/brain was performed without and with the administration of intravenous contrast. Multiplanar reformatted images are provided for review. MIP images are provided for review. Automated exposure control, iterative reconstruction, and/or weight based adjustment of the mA/kV was utilized to reduce the radiation dose  room.    Amando Mistry MD, ELVIN  Diplomate, American Board of Psychiatry & Neurology  Board Certified in Vascular Neurology  Board Certified in Neuromuscular Medicine  Certified in Neurorehabilitation         Plan:

## 2024-11-18 NOTE — PROGRESS NOTES
Follow-up CT head does not reveal any significant change in left frontal ICH.  Continue neurochecks and SBP control, appreciate neurology.  No neurosurgical intervention recommended.  Willy Redd MD

## 2024-11-18 NOTE — CARE COORDINATION
Dr. Holley met with pt and daughter in law and explained POC.    Pt A&O x 4. Pt wishes to have POA designated, daughter in law Tramsarah and grand-daughter Mishelchaplain kajal consult placed.     Electronically signed by Hien Tan RN, BSN on 11/18/2024 at 11:49 AM

## 2024-11-18 NOTE — PROGRESS NOTES
Stroke Education provided to patient and the following topics were discussed    1. Patients personal risk factors for stroke are hyperlipidemia and hypertension    2. Warning signs of Stroke:        * Sudden numbness or weakness of the face, arm or leg, especially on one side of          The body            * Sudden confusion, trouble speaking or understanding        * Sudden trouble seeing in one or both eyes        * Sudden trouble walking, dizziness, loss of balance or coordination        * Sudden severe headache with no known cause      3. Importance of activation Emergency Medical Services ( 9-1-1 ) immediately if experience any warning signs of stroke.    4. Be sure and schedule a follow-up appointment with your primary care doctor or any specialists as instructed.     5. You must take medicine every day to treat your risk factors for stroke.  Be sure to take your medicines exactly as your doctor tells you: no more, no less.  Know what your medicines are for , what they do.  Anti-thrombotics /anticoagulants can help prevent strokes.  You are taking the following medicine(s)  Losartan, lipitor     6.  Smoking and second-hand smoke greatly increase your risk of stroke, cardiovascular disease and death. Smoking history never    7. Information provided was Stroke Handouts or Verbal Education    8. Documentation of teaching completed in Patient Education Activity and on Care Plan with teaching response noted?  yes    Alex Lizama, RN  2:40 PM EST

## 2024-11-18 NOTE — ED PROVIDER NOTES
[facility] ED  EMERGENCY DEPARTMENT ENCOUNTER      Pt Name: Anahi Willett  MRN: [id number]  Birthdate 1950  Date of evaluation: 11/18/2024  Provider: Kavita Sofia DO    CHIEF COMPLAINT       Chief Complaint   Patient presents with    Fall    Loss of Consciousness         HISTORY OF PRESENT ILLNESS   (Location/Symptom, Timing/Onset, Context/Setting, Quality, Duration, Modifying Factors, Severity)  Note limiting factors.   Anahi Willett is a 74 y.o. female who presents to the emergency department .  Patient presents after being found on the floor this morning.  She went to bed feeling fine.  She does not remember falling.  When she woke up, she was not able to use her right lower extremity .  Because she could not get up she was incontinent of urine on the floor but knew she needed to go.she just could not walk.  Patient has complaint of some pain to her right shoulder right upper back and cannot use her right lower extremity.  She is denying  headache or neck pain.  No thinners. No aspirin or plavix.    HPI    Nursing Notes were reviewed.    REVIEW OF SYSTEMS    (2-9 systems for level 4, 10 or more for level 5)     Review of Systems   Constitutional:  Negative for activity change, appetite change and fatigue.   HENT:  Negative for congestion and sore throat.    Eyes:  Negative for pain and visual disturbance.   Respiratory:  Negative for chest tightness and shortness of breath.    Cardiovascular:  Negative for chest pain.   Gastrointestinal:  Negative for abdominal pain, nausea and vomiting.   Endocrine: Negative for polydipsia.   Genitourinary:  Negative for flank pain and urgency.   Musculoskeletal:  Positive for arthralgias and back pain. Negative for gait problem and neck stiffness.   Skin:  Negative for rash.   Neurological:  Positive for weakness. Negative for light-headedness and headaches.   Psychiatric/Behavioral:  Negative for confusion and sleep disturbance.         Except as noted above the remainder of the review of systems was reviewed and negative.       PAST MEDICAL HISTORY     Past Medical History:   Diagnosis Date    Cervical cancer (HCC)     COPD (chronic obstructive pulmonary disease) (HCC)     Hypertension          SURGICAL HISTORY       Past Surgical History:   Procedure Laterality Date    APPENDECTOMY      CARPAL TUNNEL RELEASE Left     HYSTERECTOMY (CERVIX STATUS UNKNOWN)           CURRENT MEDICATIONS       Previous Medications    ACETAMINOPHEN (TYLENOL) 500 MG TABLET    Take 0.5 tablets by mouth every 6 hours as needed for Pain    ASPIRIN 81 MG EC TABLET    Take 1 tablet by mouth daily    CALCIUM ASCORBATE 500 MG TABS    Take 1 tablet by mouth daily    LOSARTAN (COZAAR) 25 MG TABLET    Take 1 tablet by mouth daily    MELATONIN 5 MG TABS TABLET    Take 1 tablet by mouth nightly    POLYCARBOPHIL (FIBERCON) 625 MG TABLET    Take 625 mg by mouth    VITAMIN C (ASCORBIC ACID) 500 MG TABLET    Take 1 tablet by mouth daily       ALLERGIES     Patient has no known allergies.    FAMILY HISTORY     No family history on file.       SOCIAL HISTORY       Social History     Socioeconomic History    Marital status:    Tobacco Use    Smoking status: Former     Types: Cigarettes    Smokeless tobacco: Never   Vaping Use    Vaping status: Never Used   Substance and Sexual Activity    Alcohol use: Yes    Drug use: Never     Social Determinants of Health     Physical Activity: Sufficiently Active (9/19/2024)    Received from [facility]    Exercise Vital Sign     Days of Exercise per Week: 3 days     Minutes of Exercise per Session: 120 min       SCREENINGS   NIH Stroke Scale  Interval: Baseline  Level of Consciousness (1a): Alert  LOC Questions (1b): Answers both correctly  LOC Commands (1c): Performs both tasks correctly  Best Gaze (2): Normal  Visual (3): No visual loss  Facial Palsy (4): Normal symmetrical movement  Motor Arm, Left (5a): No drift  Motor Arm, Right

## 2024-11-18 NOTE — CONSULTS
NEUROSURGERY and SPINE CONSULT NOTE      Patient Name: Anahi Willett  Patient : 1950  MRN: [id number]       PCP: Fahad Mayorga DO      History of Present Ilness: 74 y.o. presents after fall.  Patient reports she woke up this morning on the floor.  She does not remember falling.  Upon waking up she was not able to use her right lower extremity.  Patient had an episode of incontinence but this was due to her inability to walk.  Patient denied any headache or neck pain.  Patient is not on any blood thinners.    Past Medical History:        Diagnosis Date    Cervical cancer (HCC)     COPD (chronic obstructive pulmonary disease) (HCC)     Hypertension        Past Surgical History:        Procedure Laterality Date    APPENDECTOMY      CARPAL TUNNEL RELEASE Left     HYSTERECTOMY (CERVIX STATUS UNKNOWN)         Home Medications:   Prior to Admission medications    Medication Sig Start Date End Date Taking? Authorizing Provider   aspirin 81 MG EC tablet Take 1 tablet by mouth daily 10/17/23   Judy Recinos PA   vitamin C (ASCORBIC ACID) 500 MG tablet Take 1 tablet by mouth daily    Talya Gonzalez MD   acetaminophen (TYLENOL) 500 MG tablet Take 0.5 tablets by mouth every 6 hours as needed for Pain    Talya Gonzalez MD   Calcium Ascorbate 500 MG TABS Take 1 tablet by mouth daily    Talya Gonzalez MD   polycarbophil (FIBERCON) 625 MG tablet Take 625 mg by mouth  Patient not taking: Reported on 10/15/2023 9/25/18   Talya Gonzalez MD   losartan (COZAAR) 25 MG tablet Take 1 tablet by mouth daily 10/24/18   Talya Gonzalez MD   melatonin 5 MG TABS tablet Take 1 tablet by mouth nightly    ProviderTalya MD           Allergies: Patient has no known allergies.    Social History:      TOBACCO:   reports that she has quit smoking. Her smoking use included cigarettes. She has never used smokeless tobacco.  ETOH:   reports current alcohol use.  RECREATIONAL DRUG USE:

## 2024-11-18 NOTE — ED NOTES
Patient stated that she believes her sat down to watch television last night around 1900 and woke up on the floor. Patient stated that she dragged her self across the floor to her bedroom. Patient stated that she was able get to her cell phone around 0600 and was able to call her daughter in law for help.

## 2024-11-18 NOTE — ACP (ADVANCE CARE PLANNING)
Advance Care Planning     Advance Care Planning Inpatient Note  [facility] Department    Today's Date: 11/18/2024  Unit: [facility] ED    Received request from patient and family.  Upon review of chart and communication with care team, patient's decision making abilities are not in question.. Patient and Child/Children was/were present in the room during visit.    Goals of ACP Conversation:  Discuss advance care planning documents    Health Care Decision Makers:       Primary Decision Maker: [facility] - Child - [phone]    Secondary Decision Maker: ADRIANALENNIESHANT - Grandchild - [phone]  Summary:  Completed New Documents     visited patient and daughter for an advanced directive consult in the ED.  assisted patient in completing her HPOA before going to a room on a medical floor. Patient named her daughter as her primary agent and her granddaughter as her alternate agent.  placed a copy in the patient's paper chart.   Advance Care Planning Documents (Patient Wishes):  Healthcare Power of /Advance Directive Appointment of Health Care Agent     Assessment:      Interventions:  Assisted in the completion of documents according to patient's wishes at this time    Care Preferences Communicated:   No    Outcomes/Plan:  New advance directive completed.    Electronically signed by Chaplain Jorge on 11/18/2024 at 1:02 PM

## 2024-11-18 NOTE — ED TRIAGE NOTES
Patient brought by private car following falling last night -  unknown how she fell. States woke up on the floor. Patient states complete flaccidity to right leg +pulses in foot, groin. Bruising noted to right side of face, pain to right shoulder and right back. Patient a&ox4 upon arrival. Patient denies numbness to extremities. Has sensation to right leg just unable to move. Denies thinners

## 2024-11-18 NOTE — CONSULTS
Pulmonary and Critical Care Medicine  Consult Note  Encounter Date: 2024 5:40 PM    Ms. Anahi Willett is a 74 y.o. female  : 1950  Requesting Provider: Maddie Becker DO    Reason for request: ICU management            HISTORY OF PRESENT ILLNESS:    Patient is 74 y.o. presents with intracranial hemorrhage patient has history of hypertension hyperlipidemia, otherwise healthy, she exercises 3 times a week, and she is compliant with her treatment.  She had an episode of right leg weakness, fell down on the floor and hit her right head.  She was found to have 2 cm intracranial hemorrhage.  She has no chest pain, no shortness of breath, no tingling or numbness, she cannot move her right lower extremity, no prior strokelike symptoms, no history of sleep apnea.          Past Medical History:        Diagnosis Date    Cervical cancer (HCC)     COPD (chronic obstructive pulmonary disease) (HCC)     Hypertension        Past Surgical History:        Procedure Laterality Date    APPENDECTOMY      CARPAL TUNNEL RELEASE Left     HYSTERECTOMY (CERVIX STATUS UNKNOWN)         Social History:     reports that she has quit smoking. Her smoking use included cigarettes. She has never used smokeless tobacco. She reports current alcohol use. She reports that she does not use drugs.    Family History:   No family history on file.  No family history of lung disease  Allergies:  Patient has no known allergies.        MEDICATIONS during current hospitalization:    Continuous Infusions:   sodium chloride      niCARdipine 5 mg/hr (24 1643)       Scheduled Meds:   sodium chloride flush  5-40 mL IntraVENous 2 times per day    levETIRAcetam  500 mg IntraVENous Q12H    atorvastatin  20 mg Oral Daily    [START ON 2024] losartan  100 mg Oral Daily    melatonin  5 mg Oral Nightly    vitamin C  500 mg Oral Daily       PRN Meds:sodium chloride flush, sodium chloride, acetaminophen **OR** acetaminophen, ondansetron

## 2024-11-18 NOTE — CARE COORDINATION
Case Management Assessment  Initial Evaluation    Date/Time of Evaluation: 11/18/2024 12:10 PM  Assessment Completed by: Hien Tan RN, BSN    If patient is discharged prior to next notation, then this note serves as note for discharge by case management.    Patient Name: Anahi Willett                   YOB: 1950  Diagnosis: Intracranial hemorrhage (HCC) [I62.9]                   Date / Time: 11/18/2024  7:59 AM    Patient Admission Status: Inpatient   Readmission Risk (Low < 19, Mod (19-27), High > 27): Readmission Risk Score: 8.8    Current PCP: Fahad Mayorga, DO  PCP verified by CM? Yes    Chart Reviewed: Yes      History Provided by: Patient  Patient Orientation: Alert and Oriented, Person, Place, Situation, Self    Patient Cognition: Alert    Hospitalization in the last 30 days (Readmission):  No    If yes, Readmission Assessment in CM Navigator will be completed.    Advance Directives:      Code Status: Not on file   Patient's Primary Decision Maker is: Legal Next of Kin    Primary Decision Maker: Abena Ward - Daughter-in-Law - [phone]    Secondary Decision Maker: SHANT PALACIOS - Grandchild - [phone]    Discharge Planning:    Patient lives with: Alone Type of Home: House  Primary Care Giver: Self  Patient Support Systems include: Family Members   Current Financial resources: Medicare  Current community resources: None  Current services prior to admission: Durable Medical Equipment            Current DME: Home Aerosol            Type of Home Care services:  None    ADLS  Prior functional level: Independent in ADLs/IADLs  Current functional level: Assistance with the following:, Other (see comment) (CANNOT MOVE RIGHT LEG AT ALL PER PT)    Family can provide assistance at DC: Yes  Would you like Case Management to discuss the discharge plan with any other family members/significant others, and if so, who? Yes (DAUGHTER-IN LAW)  Plans to Return to Present  Patient and/or Patient Representative Agree with the Discharge Plan? Yes (ACUTE REHAB VS SNFA)    Hien Tan, RN, BSN  Case Management Department

## 2024-11-18 NOTE — H&P
History and Physical    Admit Date: 11/18/2024  PCP: Fahad Mayorga DO    CHIEF COMPLAINT:    Chief Complaint   Patient presents with    Fall    Loss of Consciousness        HISTORY OF PRESENT ILLNESS:    The patient is a 74 y.o. female with a past medical history of hypertension hyperlipidemia who presents to the hospital with a fall.  Patient reports that she was walking and did not have any symptoms prior when she fell down to the ground hit her head and her right side.  She was found to have 2 cm intracranial hemorrhage within the left frontal lobe in the ER.  Neurology and neurosurgery was already contacted.  Neurosurgery seen the patient.  Patient denies loss of consciousness.  No fever or chills.  No numbness or tingling but she does have weakness in her right lower extremity.  She is not able to move her right lower extremity.  She is not able to walk.    Past Medical History:        Diagnosis Date    Cervical cancer (HCC)     COPD (chronic obstructive pulmonary disease) (HCC)     Hypertension        Past Surgical History:        Procedure Laterality Date    APPENDECTOMY      CARPAL TUNNEL RELEASE Left     HYSTERECTOMY (CERVIX STATUS UNKNOWN)         Social History:   Social History     Socioeconomic History    Marital status:      Spouse name: Not on file    Number of children: Not on file    Years of education: Not on file    Highest education level: Not on file   Occupational History    Not on file   Tobacco Use    Smoking status: Former     Types: Cigarettes    Smokeless tobacco: Never   Vaping Use    Vaping status: Never Used   Substance and Sexual Activity    Alcohol use: Yes    Drug use: Never    Sexual activity: Not on file   Other Topics Concern    Not on file   Social History Narrative    Not on file     Social Determinants of Health     Financial Resource Strain: Not on file   Food Insecurity: No Food Insecurity (11/18/2024)    Hunger Vital Sign     Worried About Running Out of Food in  systems reviewed and negative except for what is in HPI       PHYSICAL EXAM:  Vitals:  BP (!) 176/78   Pulse 78   Temp 98.4 °F (36.9 °C) (Oral)   Resp 24   Ht 1.702 m (5' 7\")   Wt 52.6 kg (116 lb)   SpO2 95%   BMI 18.17 kg/m²   BMI Classification: Normal Weight (BMI 18.5-24.9)  Pulse Ox: SpO2  Av.5 %  Min: 93 %  Max: 100 %  Supplemental O2:      CONSTITUTIONAL:  awake, alert, cooperative, no apparent distress, and appears stated age  HEENT: Normocephalic, PERRLA  NECK: no JVD, no LAD  HEART: RRR, no murmurs, gallops, or rubs  LUNGS: clear to auscultation bilaterally, no wheezes, crackles, or rhonchi.  ABDOMEN: soft/NT/ND, positive BS  MUSCULOSKELETAL: negative for edema, +2 pulses  SKIN: intact without rash or jaundice  NEURO: Alert and oriented x 3, strength 5/5 bilateral upper extremities, strength 5 out of 5 left lower extremity, strength 0/5 right lower extremity.  Sensation intact and symmetric throughout.  Speech normal.    DATA:  Recent Results (from the past 24 hour(s))   POCT Glucose    Collection Time: 24  8:10 AM   Result Value Ref Range    POC Glucose 97 70 - 99 mg/dl    Performed on ACCU-CHEK    EKG 12 Lead    Collection Time: 24  8:21 AM   Result Value Ref Range    Ventricular Rate 70 BPM    Atrial Rate 70 BPM    P-R Interval 170 ms    QRS Duration 86 ms    Q-T Interval 424 ms    QTc Calculation (Bazett) 457 ms    P Axis 85 degrees    R Axis 53 degrees    T Axis 65 degrees   POCT Venous    Collection Time: 24  8:25 AM   Result Value Ref Range    POC Creatinine 0.6 0.6 - 1.2 mg/dL    Est, Glom Filt Rate >90 >60    Sample Type MANPREET     Performed on SEE BELOW    CBC with Auto Differential    Collection Time: 24  8:26 AM   Result Value Ref Range    WBC 8.6 4.8 - 10.8 K/uL    RBC 4.35 4.20 - 5.40 M/uL    Hemoglobin 13.3 12.0 - 16.0 g/dL    Hematocrit 39.7 37.0 - 47.0 %    MCV 91.3 79.4 - 94.8 fL    MCH 30.6 27.0 - 31.3 pg    MCHC 33.5 33.0 - 37.0 %    RDW 13.1 11.5 - 14.5

## 2024-11-18 NOTE — CONSULTS
Spiritual Health History and Assessment/Progress Note  [facility]    Advance Care Planning, Code Stroke,  ,      Name: Anahi Willett MRN: [id number]    Age: 74 y.o.     Sex: female   Language: English   Yarsani: Non-Anabaptist   Intracranial hemorrhage (HCC)     Date: 11/18/2024            Total Time Calculated: 30 min              Spiritual Assessment began in [facility] ED        Referral/Consult From: Family, Patient   Encounter Overview/Reason: Advance Care Planning  Service Provided For: Patient and family together     visited patient and daughter for an advanced directive consult in the ED.  assisted patient in completing her HPOA before going to a room on a medical floor. Patient named her daughter as her primary agent and her granddaughter as her alternate agent.  placed a copy in the patient's paper chart.    Emperatriz, Belief, Meaning:   Patient has beliefs or practices that help with coping during difficult times  Family/Friends have beliefs or practices that help with coping during difficult times      Importance and Influence:  Patient has spiritual/personal beliefs that influence decisions regarding their health  Family/Friends have spiritual/personal beliefs that influence decisions regarding the patient's health    Community:  Patient feels well-supported. Support system includes: Children and Extended family  Family/Friends feel well-supported. Support system includes: Children and Extended family    Assessment and Plan of Care:     Patient Interventions include: Assisted in Advance Care Planning conversation  Family/Friends Interventions include: Assisted in Advance Care Planning conversation    Patient Plan of Care: Spiritual Care available upon further referral  Family/Friends Plan of Care: Spiritual Care available upon further referral    Electronically signed by Chaplain Jorge on 11/18/2024 at 12:57 PM

## 2024-11-18 NOTE — PROGRESS NOTES
Spiritual Health History and Assessment/Progress Note  [facility]    Crisis, Code Stroke,  ,      Name: Anahi Willett MRN: [id number]    Age: 74 y.o.     Sex: female   Language: English   Latter-day: Non-Faith   <principal problem not specified>     Date: 11/18/2024            Total Time Calculated: 15 min              Spiritual Assessment began in [facility] ED            Encounter Overview/Reason: Crisis     spoke with patient and her daughter-in-law who was present.  They were very calm and trusting in the staff's care.   prayed with them at bedside and they expressed gratitude for visit and prayer.       Emperatriz, Belief, Meaning:   Patient is connected with a emperatriz tradition or spiritual practice and has beliefs or practices that help with coping during difficult times  Family/Friends are connected with a emperatriz tradition or spiritual practice      Importance and Influence:  Patient has spiritual/personal beliefs that influence decisions regarding their health  Family/Friends have spiritual/personal beliefs that influence decisions regarding the patient's health    Community:  Patient feels well-supported. Support system includes: Extended family  Family/Friends Other: uncertain    Assessment and Plan of Care:     Patient Interventions include: Other: prayer  Family/Friends Interventions include: Other: prayer with patient/family    Patient Plan of Care: No spiritual needs identified for follow-up  Family/Friends Plan of Care: No spiritual needs identified for follow-up and Spiritual Care available upon further referral    Electronically signed by Chaplain Vilma on 11/18/2024 at 8:46 AM

## 2024-11-19 ENCOUNTER — APPOINTMENT (OUTPATIENT)
Dept: CT IMAGING | Age: 74
End: 2024-11-19
Attending: INTERNAL MEDICINE
Payer: MEDICARE

## 2024-11-19 ENCOUNTER — APPOINTMENT (OUTPATIENT)
Dept: CT IMAGING | Age: 74
End: 2024-11-19
Payer: MEDICARE

## 2024-11-19 LAB
ANION GAP SERPL CALCULATED.3IONS-SCNC: 12 MEQ/L (ref 9–15)
BUN SERPL-MCNC: 10 MG/DL (ref 8–23)
CALCIUM SERPL-MCNC: 9.2 MG/DL (ref 8.5–9.9)
CHLORIDE SERPL-SCNC: 101 MEQ/L (ref 95–107)
CHOLEST SERPL-MCNC: 170 MG/DL (ref 0–199)
CO2 SERPL-SCNC: 25 MEQ/L (ref 20–31)
CREAT SERPL-MCNC: 0.37 MG/DL (ref 0.5–0.9)
EKG ATRIAL RATE: 70 BPM
EKG P AXIS: 85 DEGREES
EKG P-R INTERVAL: 170 MS
EKG Q-T INTERVAL: 424 MS
EKG QRS DURATION: 86 MS
EKG QTC CALCULATION (BAZETT): 457 MS
EKG R AXIS: 53 DEGREES
EKG T AXIS: 65 DEGREES
EKG VENTRICULAR RATE: 70 BPM
ERYTHROCYTE [DISTWIDTH] IN BLOOD BY AUTOMATED COUNT: 13.2 % (ref 11.5–14.5)
ESTIMATED AVERAGE GLUCOSE: 103 MG/DL
GLUCOSE SERPL-MCNC: 100 MG/DL (ref 70–99)
HBA1C MFR BLD: 5.2 % (ref 4–6)
HCT VFR BLD AUTO: 36.8 % (ref 37–47)
HDLC SERPL-MCNC: 106 MG/DL (ref 40–59)
HGB BLD-MCNC: 12.4 G/DL (ref 12–16)
LDLC SERPL CALC-MCNC: 53 MG/DL (ref 0–129)
MCH RBC QN AUTO: 30.4 PG (ref 27–31.3)
MCHC RBC AUTO-ENTMCNC: 33.7 % (ref 33–37)
MCV RBC AUTO: 90.2 FL (ref 79.4–94.8)
PLATELET # BLD AUTO: 198 K/UL (ref 130–400)
POTASSIUM SERPL-SCNC: 3.6 MEQ/L (ref 3.4–4.9)
RBC # BLD AUTO: 4.08 M/UL (ref 4.2–5.4)
SODIUM SERPL-SCNC: 138 MEQ/L (ref 135–144)
TRIGL SERPL-MCNC: 54 MG/DL (ref 0–150)
WBC # BLD AUTO: 6.4 K/UL (ref 4.8–10.8)

## 2024-11-19 PROCEDURE — 80061 LIPID PANEL: CPT

## 2024-11-19 PROCEDURE — 36415 COLL VENOUS BLD VENIPUNCTURE: CPT

## 2024-11-19 PROCEDURE — 97163 PT EVAL HIGH COMPLEX 45 MIN: CPT

## 2024-11-19 PROCEDURE — 2580000003 HC RX 258: Performed by: INTERNAL MEDICINE

## 2024-11-19 PROCEDURE — 93010 ELECTROCARDIOGRAM REPORT: CPT | Performed by: INTERNAL MEDICINE

## 2024-11-19 PROCEDURE — 70450 CT HEAD/BRAIN W/O DYE: CPT

## 2024-11-19 PROCEDURE — 99291 CRITICAL CARE FIRST HOUR: CPT | Performed by: INTERNAL MEDICINE

## 2024-11-19 PROCEDURE — 85027 COMPLETE CBC AUTOMATED: CPT

## 2024-11-19 PROCEDURE — 99232 SBSQ HOSP IP/OBS MODERATE 35: CPT | Performed by: PSYCHIATRY & NEUROLOGY

## 2024-11-19 PROCEDURE — 99232 SBSQ HOSP IP/OBS MODERATE 35: CPT | Performed by: NEUROLOGICAL SURGERY

## 2024-11-19 PROCEDURE — 80048 BASIC METABOLIC PNL TOTAL CA: CPT

## 2024-11-19 PROCEDURE — 6370000000 HC RX 637 (ALT 250 FOR IP): Performed by: INTERNAL MEDICINE

## 2024-11-19 PROCEDURE — 6360000002 HC RX W HCPCS: Performed by: INTERNAL MEDICINE

## 2024-11-19 PROCEDURE — 99231 SBSQ HOSP IP/OBS SF/LOW 25: CPT

## 2024-11-19 PROCEDURE — 97166 OT EVAL MOD COMPLEX 45 MIN: CPT

## 2024-11-19 PROCEDURE — 2000000000 HC ICU R&B

## 2024-11-19 PROCEDURE — 83036 HEMOGLOBIN GLYCOSYLATED A1C: CPT

## 2024-11-19 PROCEDURE — 6360000002 HC RX W HCPCS

## 2024-11-19 PROCEDURE — 6360000002 HC RX W HCPCS: Performed by: PSYCHIATRY & NEUROLOGY

## 2024-11-19 RX ORDER — LORAZEPAM 2 MG/ML
INJECTION INTRAMUSCULAR
Status: COMPLETED
Start: 2024-11-19 | End: 2024-11-19

## 2024-11-19 RX ORDER — DOCUSATE SODIUM 100 MG/1
100 CAPSULE, LIQUID FILLED ORAL 2 TIMES DAILY
Status: DISCONTINUED | OUTPATIENT
Start: 2024-11-19 | End: 2024-11-22 | Stop reason: HOSPADM

## 2024-11-19 RX ORDER — POLYETHYLENE GLYCOL 3350 17 G/17G
17 POWDER, FOR SOLUTION ORAL DAILY
Status: DISCONTINUED | OUTPATIENT
Start: 2024-11-19 | End: 2024-11-22 | Stop reason: HOSPADM

## 2024-11-19 RX ORDER — AMLODIPINE BESYLATE 10 MG/1
10 TABLET ORAL DAILY
Status: DISCONTINUED | OUTPATIENT
Start: 2024-11-19 | End: 2024-11-22 | Stop reason: HOSPADM

## 2024-11-19 RX ORDER — LEVETIRACETAM 500 MG/5ML
500 INJECTION, SOLUTION, CONCENTRATE INTRAVENOUS ONCE
Status: COMPLETED | OUTPATIENT
Start: 2024-11-19 | End: 2024-11-19

## 2024-11-19 RX ORDER — LEVETIRACETAM 500 MG/5ML
750 INJECTION, SOLUTION, CONCENTRATE INTRAVENOUS EVERY 12 HOURS
Status: DISCONTINUED | OUTPATIENT
Start: 2024-11-19 | End: 2024-11-20

## 2024-11-19 RX ADMIN — LOSARTAN POTASSIUM 100 MG: 100 TABLET, FILM COATED ORAL at 09:39

## 2024-11-19 RX ADMIN — Medication 5 MG: at 20:14

## 2024-11-19 RX ADMIN — SODIUM CHLORIDE, PRESERVATIVE FREE 10 ML: 5 INJECTION INTRAVENOUS at 20:15

## 2024-11-19 RX ADMIN — AMLODIPINE BESYLATE 10 MG: 10 TABLET ORAL at 09:40

## 2024-11-19 RX ADMIN — LORAZEPAM 2 MG: 2 INJECTION INTRAMUSCULAR; INTRAVENOUS at 10:49

## 2024-11-19 RX ADMIN — NICARDIPINE HYDROCHLORIDE 2.5 MG/HR: 0.1 INJECTION INTRAVENOUS at 05:46

## 2024-11-19 RX ADMIN — LEVETIRACETAM 500 MG: 100 INJECTION INTRAVENOUS at 15:02

## 2024-11-19 RX ADMIN — ATORVASTATIN CALCIUM 20 MG: 20 TABLET, FILM COATED ORAL at 09:40

## 2024-11-19 RX ADMIN — ACETAMINOPHEN 325MG 650 MG: 325 TABLET ORAL at 20:13

## 2024-11-19 RX ADMIN — DOCUSATE SODIUM 100 MG: 100 CAPSULE, LIQUID FILLED ORAL at 20:15

## 2024-11-19 RX ADMIN — DOCUSATE SODIUM 100 MG: 100 CAPSULE, LIQUID FILLED ORAL at 09:40

## 2024-11-19 RX ADMIN — LEVETIRACETAM 750 MG: 100 INJECTION INTRAVENOUS at 23:06

## 2024-11-19 RX ADMIN — SODIUM CHLORIDE, PRESERVATIVE FREE 10 ML: 5 INJECTION INTRAVENOUS at 09:40

## 2024-11-19 RX ADMIN — ACETAMINOPHEN 325MG 650 MG: 325 TABLET ORAL at 09:59

## 2024-11-19 RX ADMIN — ACETAMINOPHEN 325MG 650 MG: 325 TABLET ORAL at 02:54

## 2024-11-19 RX ADMIN — LEVETIRACETAM 500 MG: 100 INJECTION INTRAVENOUS at 10:53

## 2024-11-19 RX ADMIN — Medication 500 MG: at 09:39

## 2024-11-19 ASSESSMENT — PAIN SCALES - WONG BAKER: WONGBAKER_NUMERICALRESPONSE: NO HURT

## 2024-11-19 ASSESSMENT — PAIN SCALES - GENERAL
PAINLEVEL_OUTOF10: 5
PAINLEVEL_OUTOF10: 0
PAINLEVEL_OUTOF10: 0
PAINLEVEL_OUTOF10: 5
PAINLEVEL_OUTOF10: 2
PAINLEVEL_OUTOF10: 0

## 2024-11-19 ASSESSMENT — PAIN DESCRIPTION - ORIENTATION
ORIENTATION: RIGHT
ORIENTATION: RIGHT

## 2024-11-19 ASSESSMENT — PAIN DESCRIPTION - LOCATION: LOCATION: ARM

## 2024-11-19 ASSESSMENT — PAIN - FUNCTIONAL ASSESSMENT: PAIN_FUNCTIONAL_ASSESSMENT: ACTIVITIES ARE NOT PREVENTED

## 2024-11-19 ASSESSMENT — PAIN DESCRIPTION - DESCRIPTORS
DESCRIPTORS: ACHING
DESCRIPTORS: DISCOMFORT;ACHING

## 2024-11-19 NOTE — PROGRESS NOTES
[facility] OCCUPATIONAL THERAPY EVALUATION - ACUTE     NAME: Anahi Willett  : 1950 (74 y.o.)  MRN: [id number]  CODE STATUS: Full Code  [facility]    Date of Service: 2024    Patient Diagnosis(es): Intracranial hemorrhage (HCC) [I62.9]  Right leg weakness [R29.898]  Hemorrhagic cerebrovascular accident (CVA) (HCC) [I61.9]   Patient Active Problem List    Diagnosis Date Noted    Intracranial hemorrhage (HCC) 2024    Hemorrhagic cerebrovascular accident (CVA) (HCC) 2024    Precordial chest pain 10/15/2023        Past Medical History:   Diagnosis Date    Cervical cancer (HCC)     COPD (chronic obstructive pulmonary disease) (HCC)     Hypertension      Past Surgical History:   Procedure Laterality Date    APPENDECTOMY      CARPAL TUNNEL RELEASE Left     HYSTERECTOMY (CERVIX STATUS UNKNOWN)          Restrictions  Restrictions/Precautions: Fall Risk              Safety Devices: Safety Devices  Type of Devices: All fall risk precautions in place;Call light within reach;Left in bed     Patient's date of birth confirmed: Yes    General:  Patient assessed for rehabilitation services?: Yes    Subjective:Pt pleasant and cooperative.          Pain at start of treatment: No    Pain at end of treatment: No    Location: N/A  Description: N/A  Nursing notified: No  RN: N/A  Intervention: Repositioned    Prior Level of Function:  Social/Functional History  Lives With: Alone  Type of Home: House  Home Layout: One level  Home Access: Stairs to enter with rails  Entrance Stairs - Number of Steps: 2  Bathroom Shower/Tub: Tub/Shower unit  Bathroom Equipment: Grab bars in shower, Hand-held shower  Home Equipment: None  Has the patient had two or more falls in the past year or any fall with injury in the past year?: Yes  ADL Assistance: Independent  Homemaking Assistance: Independent  Homemaking Responsibilities: Yes (indep grocery shopping)  Ambulation Assistance: Independent (no AD)  Transfer  task with no assistance but may require a device   Stand by assistance = Pt. does not perform task at an independent level but does not need physical assistance, requires verbal cues  Minimal, Moderate, Maximal Assistance = Pt. requires physical assistance (25%, 50%, 75% assist from helper) for task but is able to actively participate in task   Dependent = Pt. requires total assistance with task and is not able to actively participate with task completion     Plan:  Occupational Therapy Plan  Times Per Week: 1-4x/wk  Current Treatment Recommendations: Strengthening, Balance training, Neuromuscular re-education, ROM, Functional mobility training, Endurance training, Cognitive reorientation, Cognitive/Perceptual training, Self-Care / ADL, Safety education & training, Coordination training    Goals:   Patient will:    - Improve functional endurance to tolerate/complete 15-30 mins of ADL's  - Be Mod A in UB ADLs   - Be Mod A in LB ADLs  - Be Mod A in ADL transfers without LOB  - Be Mod A in toileting tasks  - Improve bilateral UE strength and endurance to Fair+ in order to participate in self-care activities as projected.  - Sequence self-care tasks with occasional verbal cues    Patient Goal: Patient goals : Not stated at this time     Discussed and agreed upon: Yes Comments:       Therapy Time:   Individual   Time In 1325   Time Out 1341   Minutes 16          Eval: 16 minutes     Electronically signed by:    BRADEN Jasso,   11/19/2024, 2:01 PM Electronically signed by BRADEN Jasso on 11/19/24 at 2:02 PM EST

## 2024-11-19 NOTE — PROGRESS NOTES
Physical Therapy Med Surg Initial Assessment  [place] [facility]  Room: [place] [address]       NAME: Anahi Willett  : 1950 (74 y.o.)  MRN: [id number]  CODE STATUS: Full Code    Date of Service: 2024    Patient Diagnosis(es): Intracranial hemorrhage (HCC) [I62.9]  Right leg weakness [R29.898]  Hemorrhagic cerebrovascular accident (CVA) (HCC) [I61.9]   Chief Complaint   Patient presents with    Fall    Loss of Consciousness     Patient Active Problem List    Diagnosis Date Noted    Intracranial hemorrhage (HCC) 2024    Hemorrhagic cerebrovascular accident (CVA) (HCC) 2024    Precordial chest pain 10/15/2023        Past Medical History:   Diagnosis Date    Cervical cancer (HCC)     COPD (chronic obstructive pulmonary disease) (HCC)     Hypertension      Past Surgical History:   Procedure Laterality Date    APPENDECTOMY      CARPAL TUNNEL RELEASE Left     HYSTERECTOMY (CERVIX STATUS UNKNOWN)         Patient assessed for rehabilitation services?: Yes  Family / Caregiver Present: No    Restrictions:  Restrictions/Precautions: Fall Risk     SUBJECTIVE:   Pain   Denies pain    Prior Level of Function:  Social/Functional History  Lives With: Alone  Type of Home: House  Home Layout: One level  Home Access: Stairs to enter with rails  Entrance Stairs - Number of Steps: 2  Bathroom Shower/Tub: Tub/Shower unit  Bathroom Equipment: Grab bars in shower, Hand-held shower  Home Equipment: None  Has the patient had two or more falls in the past year or any fall with injury in the past year?: Yes  ADL Assistance: Independent  Homemaking Assistance: Independent  Homemaking Responsibilities: Yes (indep grocery shopping)  Ambulation Assistance: Independent (no AD)  Transfer Assistance: Independent  Active : Yes  Additional Comments: pt is qustionable historian    OBJECTIVE:   Vision  Vision: Within Functional Limits (Simultaneous filing. User may not have seen previous data.)  Hearing:

## 2024-11-19 NOTE — FLOWSHEET NOTE
Fede rounded, requested the patient stay for one more day to complete 48 hour observation in a critical care setting. He states the BP is no longer a close concern, 24 hours post.    1000  Neuro assessment finding  indicate an ability to move RLE, which she was previously unable to do. Once she moved her foot it began to spasm rhythmically. This lasted approximately 3 minutes and stopped. No other deficits noted.     1030  Patient started to have muscle spasm again in RLE, neuro reassessed and a drift in the R arm is noted. Fede was notified of neurological changes. Dr. SANDRA made aware of neurological changes. 2 mg ativan administered for focal seizing in RLE. STAT CT ordered.   (See significant event note)    1115  BP has lowered, MAP of 61 noted. Cardene turned off.     Patient has remained without new acute symptoms since focal seizure event, no other seizures noted through shift.

## 2024-11-19 NOTE — CARE COORDINATION
Team ICU rounds done this am at bedside and pt on IV Cardene still requiring ICU care and monitoring.

## 2024-11-19 NOTE — PLAN OF CARE
Problem: Discharge Planning  Goal: Discharge to home or other facility with appropriate resources  Outcome: Progressing     Problem: Pain  Goal: Verbalizes/displays adequate comfort level or baseline comfort level  Outcome: Progressing  Flowsheets (Taken 11/18/2024 2000)  Verbalizes/displays adequate comfort level or baseline comfort level:   Encourage patient to monitor pain and request assistance   Assess pain using appropriate pain scale   Implement non-pharmacological measures as appropriate and evaluate response   Administer analgesics based on type and severity of pain and evaluate response     Problem: Neurosensory - Adult  Goal: Achieves stable or improved neurological status  Outcome: Progressing  Goal: Absence of seizures  Outcome: Progressing  Goal: Achieves maximal functionality and self care  Outcome: Progressing     Problem: Respiratory - Adult  Goal: Achieves optimal ventilation and oxygenation  Outcome: Progressing     Problem: Cardiovascular - Adult  Goal: Maintains optimal cardiac output and hemodynamic stability  Outcome: Progressing  Goal: Absence of cardiac dysrhythmias or at baseline  Outcome: Progressing     Problem: Skin/Tissue Integrity - Adult  Goal: Skin integrity remains intact  Outcome: Progressing  Flowsheets (Taken 11/18/2024 2000)  Skin Integrity Remains Intact:   Monitor for areas of redness and/or skin breakdown   Assess vascular access sites hourly   Every 4-6 hours minimum: Change oxygen saturation probe site     Problem: Musculoskeletal - Adult  Goal: Return mobility to safest level of function  Outcome: Progressing     Problem: Gastrointestinal - Adult  Goal: Minimal or absence of nausea and vomiting  Outcome: Progressing  Goal: Maintains or returns to baseline bowel function  Outcome: Progressing  Goal: Maintains adequate nutritional intake  Outcome: Progressing     Problem: Genitourinary - Adult  Goal: Absence of urinary retention  Outcome: Progressing     Problem: Infection  - Adult  Goal: Absence of infection at discharge  Outcome: Progressing     Problem: Metabolic/Fluid and Electrolytes - Adult  Goal: Electrolytes maintained within normal limits  Outcome: Progressing  Goal: Hemodynamic stability and optimal renal function maintained  Outcome: Progressing  Goal: Glucose maintained within prescribed range  Outcome: Progressing     Problem: Hematologic - Adult  Goal: Maintains hematologic stability  Outcome: Progressing     Problem: Self Care Deficit  Goal: Return ADL status to a safe level of function  Description: INTERVENTIONS:  1. Administer medication as ordered  2. Assess ADL deficits and provide assistive devices as needed  3. Obtain PT/OT consults as needed  4. Assist and instruct patient to increase activity and self care as tolerated  Outcome: Progressing     Problem: Chronic Conditions and Co-morbidities  Goal: Patient's chronic conditions and co-morbidity symptoms are monitored and maintained or improved  Outcome: Progressing     Problem: Safety - Adult  Goal: Free from fall injury  Outcome: Progressing  Flowsheets (Taken 11/18/2024 2000)  Free From Fall Injury: Instruct family/caregiver on patient safety

## 2024-11-19 NOTE — PROGRESS NOTES
PHARMACY NOTE:   ICU Rounds Attended (10-15 minutes in patient room):    Pt diagnosis: ICH    IV Fluids: none   Renal:   Recent Labs     11/18/24 0825 11/18/24 0826 11/19/24 0418   CREATININE 0.6 0.41* 0.37*    Estimated Creatinine Clearance: 112 mL/min (A) (based on SCr of 0.37 mg/dL (L)).        Antimicrobial Therapy:   none    Recent Labs     11/18/24 0826 11/19/24 0418   WBC 8.6 6.4         Drips: cardene at 2.5    Insulin Therapy (goal: 140-180): none    Recent Labs     11/18/24 0826 11/19/24 0418   GLUCOSE 90 100*       Steroid Therapy: none  Stress Ulcer Prophylaxis:   none    DVT Prophylaxis/Anticoagulant Therapy: none  Recent Labs     11/18/24 0826 11/19/24 0418    198     Recent Labs     11/18/24 0826   INR 0.8       Bowel Regimen:   Colace   Miralax     Follow up/Changes:   -amlodipine 10mg added per verbal on rounds-monitor for drip dc  -colace and miralax added per verbal on rounds  -home meds reviewed/renewed  -core measures: follow up need for PPI, SSI     Kavita Nichols RPH, PharmD   11/19/2024 10:50 AM

## 2024-11-19 NOTE — PROGRESS NOTES
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 Has seizure this morning. Neuro is aware. She is on keppra  Followed by neurosurgery  Neurochecks  Discussed plan of care with patient's RN   Follow MRI brain   DVT prophylaxis-SCDs, no anticoagulation until okay by neurosurgery/neurology    7 pm- 7 am, please contact on call Hospitalist for any needs     Subjective:      no new events.     Physical Examination:      Vitals:  BP (!) 83/52   Pulse 82   Temp 98.4 °F (36.9 °C) (Oral)   Resp 15   Ht 1.702 m (5' 7\")   Wt 52.6 kg (116 lb)   SpO2 90%   BMI 18.17 kg/m²   Temp (24hrs), Av.4 °F (36.9 °C), Min:98.4 °F (36.9 °C), Max:98.4 °F (36.9 °C)      CONSTITUTIONAL:  awake, alert, cooperative, no apparent distress, and appears stated age  HEENT: Normocephalic, PERRLA  NECK: no JVD, no LAD  HEART: RRR, no murmurs, gallops, or rubs  LUNGS: clear to auscultation bilaterally, no wheezes, crackles, or rhonchi.  ABDOMEN: soft/NT/ND, positive BS  MUSCULOSKELETAL: negative for edema, +2 pulses  SKIN: intact without rash or jaundice  NEURO: Alert and oriented x 3, strength 5/5 bilateral upper extremities, strength 5 out of 5 left lower extremity, strength 0/5 right lower extremity.  Sensation intact and symmetric throughout.  Speech normal.    Data:     Labs:  Recent Labs     24   WBC 8.6 6.4   HGB 13.3 12.4    198     Recent Labs     24    138   K 3.7 3.6    101   CO2 25 25   BUN 9 10   CREATININE 0.41*

## 2024-11-19 NOTE — PROGRESS NOTES
[facility]   [place] [facility]  Speech Language Pathology    Anahi Willett  1950  [id number]/[id number]    Date: 11/19/2024      Speech Therapy attempted to see Anahi Willett on this date for a/an:    Clinical Bedside Swallow Evaluation and Speech Language Evaluation    Pt was unable to be seen due to:   Nursing deferred:  Pt being taken to CT at time of attempt due to new neuro changes.  Informed RN to page SLP if able to participate later this date.        Electronically signed by Mayur Kaur, HOOD on 11/19/24 at 12:49 PM EST

## 2024-11-19 NOTE — PROGRESS NOTES
Pt to ICU bed 8 today at 1305, NIHSS completed with score of 5 representing RLE flaccidity w/limb ataxia, otherwise neuro stable throughout entire shift. Repeat Head CT completed shortly after arrival. Admission documentation and medication reconciliation completed. Extensive stroke teaching given to patient which patient was receptive too and vocalized understanding. Pt has displayed enhanced readiness for rehabilitation and is eager to return to her previous state of health. Htn upon arrival that was not fully resolved after 10mg labetalol IV push x3, nicardipine gtt started at 5mg/hr to maintain SBP's under 140. Otherwise pt is unchanged, pt has expressed sadness about her otherwise active lifestyle and treatment adherence and then despite that she had this event. Emotional support given, which pt was appreciative for. She ate between 50% and 75% of dinner with minimal assistance. SCD's are on and functioning, mepilexes applied, skin is intact, fall precautions initiated, maintained, and pt educated on.

## 2024-11-19 NOTE — PROGRESS NOTES
Neurology Follow up    SUBJECTIVE: No new symptoms except for weakness and paralysis of the entire right lower extremity  Current Facility-Administered Medications   Medication Dose Route Frequency Provider Last Rate Last Admin    docusate sodium (COLACE) capsule 100 mg  100 mg Oral BID Wili Conner MD   100 mg at 11/19/24 0940    amLODIPine (NORVASC) tablet 10 mg  10 mg Oral Daily Wili Conner MD   10 mg at 11/19/24 0940    polyethylene glycol (GLYCOLAX) packet 17 g  17 g Oral Daily Wili Conner MD        sodium chloride flush 0.9 % injection 5-40 mL  5-40 mL IntraVENous 2 times per day EugenioMaddie R, DO   10 mL at 11/19/24 0940    sodium chloride flush 0.9 % injection 5-40 mL  5-40 mL IntraVENous PRN Maddie Becker R, DO        0.9 % sodium chloride infusion   IntraVENous PRN EugenioMaddie R, DO        acetaminophen (TYLENOL) tablet 650 mg  650 mg Oral Q6H PRN Maddie Becker R, DO   650 mg at 11/19/24 0959    Or    acetaminophen (TYLENOL) suppository 650 mg  650 mg Rectal Q6H PRN Eugenio, Maddie R, DO        ondansetron (ZOFRAN-ODT) disintegrating tablet 4 mg  4 mg Oral Q8H PRN Eugenio, Maddie R, DO        Or    ondansetron (ZOFRAN) injection 4 mg  4 mg IntraVENous Q6H PRN Eugenio, Yazid R, DO        levETIRAcetam (KEPPRA) injection 500 mg  500 mg IntraVENous Q12H Maddie Becker R, DO   500 mg at 11/18/24 2356    labetalol (NORMODYNE;TRANDATE) injection 10 mg  10 mg IntraVENous Q10 Min PRN Maddie Becker R, DO   10 mg at 11/18/24 1424    niCARdipine (CARDENE) 20 mg in 0.9 % sodium chloride 200 mL solution  2.5-15 mg/hr IntraVENous Continuous EugenioMaddie wooten R, DO 25 mL/hr at 11/19/24 0718 2.5 mg/hr at 11/19/24 0718    atorvastatin (LIPITOR) tablet 20 mg  20 mg Oral Daily Eugenio, Yazid R, DO   20 mg at 11/19/24 0940    losartan (COZAAR) tablet 100 mg  100 mg Oral Daily Eugenio, Krystazid R, DO   100 mg at 11/19/24 0939    melatonin disintegrating tablet 5 mg  5 mg Oral Nightly Eugenio, Maddie R, DO   5  The cardiomediastinal silhouette is without acute process. The osseous structures are without acute process.  Tiny calcified granulomas in the right upper lung.     No acute process.     CT CHEST W CONTRAST    Result Date: 11/18/2024  EXAMINATION: CT OF THE CHEST WITH CONTRAST 11/18/2024 9:37 am TECHNIQUE: CT of the chest was performed with the administration of intravenous contrast. Multiplanar reformatted images are provided for review. Automated exposure control, iterative reconstruction, and/or weight based adjustment of the mA/kV was utilized to reduce the radiation dose to as low as reasonably achievable. COMPARISON: None. HISTORY: ORDERING SYSTEM PROVIDED HISTORY: trauma TECHNOLOGIST PROVIDED HISTORY: Reason for exam:->trauma Additional Contrast?->None What reading provider will be dictating this exam?->CRC FINDINGS: Mediastinum: Thyroid is homogeneous in appearance.  No mediastinal mass or adenopathy.  Vascular calcifications seen within the thoracic aorta.  Some calcifications seen within the great vessels.  Coronary artery calcifications identified.  No pericardial effusion.  No bulky hilar or axillary lymphadenopathy. Lungs/pleura: The lung parenchyma is clear.  No parenchymal consolidation, pulmonary mass or nodule.  No pleural effusion or pneumothorax.  There is minimal scarring versus atelectatic change identified medially and inferiorly within the left upper lobe.  There is minimal scarring identified at the right lung base. Upper Abdomen: Visualized upper abdomen is grossly unremarkable. Soft Tissues/Bones: Multilevel degenerative changes identified diffusely throughout the spine.  No acute chest wall abnormality.  No displaced rib fracture.     1. No acute intrathoracic abnormality. 2. Minimal scarring versus atelectatic change identified medially and inferiorly within the left upper lobe. There is minimal scarring identified at the right lung base.     CT FACIAL BONES WO CONTRAST    Result Date:

## 2024-11-19 NOTE — PROGRESS NOTES
Spiritual Care Services     Summary of Visit:  Attended ICU rounds. Pt awake, no oxygen, family present. Pt recovering from a fall. Pt is non-Sabianist. Her dtr is her next-of-kin.     Encounter Summary  Encounter Overview/Reason: Interdisciplinary rounds  Service Provided For: Patient and family together  Referral/Consult From: Family, Patient  Support System: Children, Family members  Complexity of Encounter: Moderate  Begin Time: 1225  End Time : 1255  Total Time Calculated: 30 min     Crisis  Type: Code Stroke  Spiritual/Emotional needs  Type: Spiritual Support                 Advance Care Planning  Type: Completed AD/ACP document(s)    Spiritual Assessment/Intervention/Outcomes:    Assessment: Coping, Calm    Intervention: Active listening, Prayer (assurance of)/[place]    Outcome: Comfort, Expressed Gratitude      Care Plan:    Plan and Referrals  Plan/Referrals: Continue Support (comment)      Spiritual Care Services   Electronically signed by ELIZA Martinez on 11/19/2024 at 3:23 PM.    To reach a  for emotional and spiritual support, place an EPIC consult request.   If a  is needed immediately, dial “0” and ask to page the on-call .

## 2024-11-19 NOTE — PROGRESS NOTES
Patient is pleasant and in good mood.  She reports no new neurological symptoms.     Exam:   A&O x 3  Right upper extremity 4 out of 5, right lower extremity 0 out of 5  Sensation intact to light touch  R periorbital ecchymosis improving, R perioral ecchymosis improving    Repeat head CT done this morning revealed by Dr. Redd and I shows stable left frontal ICH.  No significant changes from previous studies. Plan remains same to continue neurochecks and SBP control, follow neurology recommendations.  No neurosurgical intervention at this time.    Saeid Crawley PA-C    Neurosurgery attending addendum:  Patient seen and examined.  A little while ago she was noted to have twitching in the right leg concerning for seizure.  She was given several milligrams of Ativan.  She then went for CT head.  She is awake and oriented x 3  Right upper extremity 4 out of 5, right lower extremity 0 out of 5  Moving left side strongly  CT head reveals no change in left frontal ICH compared to this a.m.  Assessment and plan: Left frontal ICH, she has remained neurologically stable with right arm paresis and right leg plegia.  Question of twitching in right leg which could have been focal motor seizures.  Follow-up CT head this afternoon is unchanged.  On anticonvulsants per Harsha Crespo dose was increased.  There is no neurosurgical intervention recommended.  I appreciate neurology for further evaluation and treatment, neurology will likely obtain MRI brain in near future to rule out structural pathology. Willy Redd MD

## 2024-11-19 NOTE — SIGNIFICANT EVENT
11/19/24 10:42 AM   [phone] Hospital or Facility: [facility] ICU From: Renetta Calderon RE: MARICRUZ GARCIA RM: [id number] Patient has had neurological changes, she was able to flex/dorsiflex R foot and then lost control of it. Movement remained as a rhythmic muscle spasm. This lasted approximately 3 minutes. Followed up with neuro assessment and there is now a drift in R arm. No droop, responds to commands,  strong    The PerfectServe pasted above was sent to neuro as a stat message, time stamped at 1042 when the assessment findings were originally noted.     Dr. Conner assessed the patient at the same time, stated the patient was experiencing a focal seizure, ordered 2 mg Ativan to break the seizure as well as stat CT. After the ativan was administered the rhythmic movement in the RLE stopped within a minute. Patient taken to     At 1215 Dr. Redd arrived to unit with concerns of the Ativan given to the pt for focal seizure.   The orders were given by the intensivist at the time of the original incident to break the seizure because neuro has not responded as of 1200.     Neuro increased Keppra post encounter, response on PerfectServe at 1400.

## 2024-11-20 LAB
ANION GAP SERPL CALCULATED.3IONS-SCNC: 10 MEQ/L (ref 9–15)
BUN SERPL-MCNC: 10 MG/DL (ref 8–23)
CALCIUM SERPL-MCNC: 9.4 MG/DL (ref 8.5–9.9)
CHLORIDE SERPL-SCNC: 97 MEQ/L (ref 95–107)
CO2 SERPL-SCNC: 27 MEQ/L (ref 20–31)
CREAT SERPL-MCNC: 0.37 MG/DL (ref 0.5–0.9)
ERYTHROCYTE [DISTWIDTH] IN BLOOD BY AUTOMATED COUNT: 13.2 % (ref 11.5–14.5)
GLUCOSE SERPL-MCNC: 97 MG/DL (ref 70–99)
HCT VFR BLD AUTO: 38.9 % (ref 37–47)
HGB BLD-MCNC: 13.5 G/DL (ref 12–16)
MCH RBC QN AUTO: 31.8 PG (ref 27–31.3)
MCHC RBC AUTO-ENTMCNC: 34.7 % (ref 33–37)
MCV RBC AUTO: 91.5 FL (ref 79.4–94.8)
PLATELET # BLD AUTO: 202 K/UL (ref 130–400)
POTASSIUM SERPL-SCNC: 3.6 MEQ/L (ref 3.4–4.9)
RBC # BLD AUTO: 4.25 M/UL (ref 4.2–5.4)
SODIUM SERPL-SCNC: 134 MEQ/L (ref 135–144)
WBC # BLD AUTO: 6.6 K/UL (ref 4.8–10.8)

## 2024-11-20 PROCEDURE — 2580000003 HC RX 258: Performed by: INTERNAL MEDICINE

## 2024-11-20 PROCEDURE — 6370000000 HC RX 637 (ALT 250 FOR IP): Performed by: INTERNAL MEDICINE

## 2024-11-20 PROCEDURE — 6370000000 HC RX 637 (ALT 250 FOR IP): Performed by: PSYCHIATRY & NEUROLOGY

## 2024-11-20 PROCEDURE — 97116 GAIT TRAINING THERAPY: CPT

## 2024-11-20 PROCEDURE — 99232 SBSQ HOSP IP/OBS MODERATE 35: CPT | Performed by: PSYCHIATRY & NEUROLOGY

## 2024-11-20 PROCEDURE — 6360000002 HC RX W HCPCS: Performed by: INTERNAL MEDICINE

## 2024-11-20 PROCEDURE — 80048 BASIC METABOLIC PNL TOTAL CA: CPT

## 2024-11-20 PROCEDURE — 36415 COLL VENOUS BLD VENIPUNCTURE: CPT

## 2024-11-20 PROCEDURE — 1210000000 HC MED SURG R&B

## 2024-11-20 PROCEDURE — 92523 SPEECH SOUND LANG COMPREHEN: CPT

## 2024-11-20 PROCEDURE — 92610 EVALUATE SWALLOWING FUNCTION: CPT

## 2024-11-20 PROCEDURE — 99233 SBSQ HOSP IP/OBS HIGH 50: CPT | Performed by: INTERNAL MEDICINE

## 2024-11-20 PROCEDURE — 85027 COMPLETE CBC AUTOMATED: CPT

## 2024-11-20 RX ORDER — LEVETIRACETAM 500 MG/1
1000 TABLET ORAL 2 TIMES DAILY
Status: DISCONTINUED | OUTPATIENT
Start: 2024-11-20 | End: 2024-11-22 | Stop reason: HOSPADM

## 2024-11-20 RX ORDER — LIDOCAINE 4 G/G
1 PATCH TOPICAL DAILY
Status: DISCONTINUED | OUTPATIENT
Start: 2024-11-20 | End: 2024-11-22 | Stop reason: HOSPADM

## 2024-11-20 RX ADMIN — SODIUM CHLORIDE, PRESERVATIVE FREE 10 ML: 5 INJECTION INTRAVENOUS at 20:55

## 2024-11-20 RX ADMIN — ATORVASTATIN CALCIUM 20 MG: 20 TABLET, FILM COATED ORAL at 08:51

## 2024-11-20 RX ADMIN — ACETAMINOPHEN 325MG 650 MG: 325 TABLET ORAL at 11:38

## 2024-11-20 RX ADMIN — LEVETIRACETAM 1000 MG: 500 TABLET, FILM COATED ORAL at 11:38

## 2024-11-20 RX ADMIN — LOSARTAN POTASSIUM 100 MG: 100 TABLET, FILM COATED ORAL at 08:51

## 2024-11-20 RX ADMIN — DOCUSATE SODIUM 100 MG: 100 CAPSULE, LIQUID FILLED ORAL at 08:51

## 2024-11-20 RX ADMIN — ACETAMINOPHEN 325MG 650 MG: 325 TABLET ORAL at 03:19

## 2024-11-20 RX ADMIN — DOCUSATE SODIUM 100 MG: 100 CAPSULE, LIQUID FILLED ORAL at 20:54

## 2024-11-20 RX ADMIN — Medication 500 MG: at 08:51

## 2024-11-20 RX ADMIN — LEVETIRACETAM 1000 MG: 500 TABLET, FILM COATED ORAL at 20:55

## 2024-11-20 RX ADMIN — ACETAMINOPHEN 325MG 650 MG: 325 TABLET ORAL at 17:36

## 2024-11-20 RX ADMIN — POLYETHYLENE GLYCOL 3350 17 G: 17 POWDER, FOR SOLUTION ORAL at 08:51

## 2024-11-20 RX ADMIN — SODIUM CHLORIDE, PRESERVATIVE FREE 10 ML: 5 INJECTION INTRAVENOUS at 08:51

## 2024-11-20 RX ADMIN — LABETALOL HYDROCHLORIDE 10 MG: 5 INJECTION, SOLUTION INTRAVENOUS at 06:35

## 2024-11-20 RX ADMIN — AMLODIPINE BESYLATE 10 MG: 10 TABLET ORAL at 08:51

## 2024-11-20 RX ADMIN — Medication 5 MG: at 20:55

## 2024-11-20 ASSESSMENT — PAIN DESCRIPTION - LOCATION
LOCATION: RIB CAGE
LOCATION: RIB CAGE;BACK

## 2024-11-20 ASSESSMENT — PAIN DESCRIPTION - ORIENTATION
ORIENTATION: RIGHT
ORIENTATION: RIGHT;POSTERIOR

## 2024-11-20 ASSESSMENT — PAIN SCALES - GENERAL
PAINLEVEL_OUTOF10: 0
PAINLEVEL_OUTOF10: 0
PAINLEVEL_OUTOF10: 4
PAINLEVEL_OUTOF10: 0
PAINLEVEL_OUTOF10: 2
PAINLEVEL_OUTOF10: 4
PAINLEVEL_OUTOF10: 3

## 2024-11-20 ASSESSMENT — PAIN SCALES - WONG BAKER
WONGBAKER_NUMERICALRESPONSE: NO HURT
WONGBAKER_NUMERICALRESPONSE: NO HURT

## 2024-11-20 ASSESSMENT — PAIN DESCRIPTION - DESCRIPTORS
DESCRIPTORS: ACHING
DESCRIPTORS: ACHING;DISCOMFORT
DESCRIPTORS: ACHING
DESCRIPTORS: ACHING;DISCOMFORT

## 2024-11-20 ASSESSMENT — PAIN DESCRIPTION - PAIN TYPE: TYPE: ACUTE PAIN

## 2024-11-20 ASSESSMENT — PAIN - FUNCTIONAL ASSESSMENT: PAIN_FUNCTIONAL_ASSESSMENT: ACTIVITIES ARE NOT PREVENTED

## 2024-11-20 NOTE — FLOWSHEET NOTE
In rounds it was discussed that the patient meets criteria for a transfer to lower acuity floor pending clearance from neuro. Neuro was contacted and patient was cleared. Transfer was initiated, report called to 2W at 1230, spoke to ELIUD Sabillon. All belongings gathered including cell phone, , personal hygiene items, glasses, dentures (upper and lower), and heavy jacket. They were sent with the patient when transport came at 1315.

## 2024-11-20 NOTE — PROGRESS NOTES
[facility]   Facility/Department: [facility]  Speech Language Pathology  Initial Speech/Language/Cognitive Assessment    NAME:Anahi Willett  : 1950 (74 y.o.)   [x]   confirmed    MRN: [id number]  ROOM: [place] [address]  ADMISSION DATE: 2024  PATIENT DIAGNOSIS(ES): Intracranial hemorrhage (HCC) [I62.9]  Right leg weakness [R29.898]  Hemorrhagic cerebrovascular accident (CVA) (HCC) [I61.9]  Chief Complaint   Patient presents with    Fall    Loss of Consciousness     Patient Active Problem List    Diagnosis Date Noted    Intracranial hemorrhage (HCC) 2024    Hemorrhagic cerebrovascular accident (CVA) (HCC) 2024    Precordial chest pain 10/15/2023     Past Medical History:   Diagnosis Date    Cervical cancer (HCC)     COPD (chronic obstructive pulmonary disease) (HCC)     Hypertension      Past Surgical History:   Procedure Laterality Date    APPENDECTOMY      CARPAL TUNNEL RELEASE Left     HYSTERECTOMY (CERVIX STATUS UNKNOWN)         Date of Onset: 24    Date of Evaluation: 2024   Evaluating Therapist: HOOD Nunez        Diagnosis: Pt presents mild cognitive  impairment characterized by reduced problem solving with numeric/time problems/medications, and working memory short-term memory.  and reduced insight to cognitive deficits. Pt presents with good insight to situation and motivated to get better.    Requires SLP Intervention: Yes     D/C Recommendations: Ongoing speech therapy is recommended during this hospitalization;Ongoing speech therapy is recommended at next level of care    General  General Comment  Comments: Pt admitted with fall, ICH within the left frontal lobe-right hemiparesis, mainly in the lower extremity  Acute stroke   Focal seizure  Subjective  Subjective: Pt intermittently emotional. SLP provided comfort.  Behavior/Cognition  Behavior/Cognition: Alert;Cooperative;Pleasant mood   Respiratory Status: Room air  O2 Device: None (Room air)  Previous  Within Functional Limits  Safety/Judgment  Safety/Judgment: Within Functional Limits    Recommendations  Requires SLP Intervention: Yes  D/C Recommendations: Ongoing speech therapy is recommended during this hospitalization;Ongoing speech therapy is recommended at next level of care  Duration of Treatment: 1week  Frequency of Treatment: 2-3x week    Prognosis  Speech Therapy Prognosis  Prognosis Considerations: Age;Participation Level;Motivation;Previous Level of Function    Education  Patient Education: Role of SLP, rationale of evaluation, results of evaluation, and plan of care.  Patient Education Response: Verbalizes understanding    Treatment/Goals  Short Term Goals  Time Frame for Short Term Goals: 1 week  Goal 1: Patient will complete functional math activities with min cues with 90% accuracy.  Goal 2: Patient will complete calculations for time/money management with min cues with 90% accuracy.  Goal 3: Patient will utilize a memory strategy to recall 4-6 words/pictures with min cues with 90 % accuracy.  Long Term Goals  Time Frame for Long Term Goals: 1-2 weeks  Goal 1: pt  will demonstrate functional cognitive-linguistic abilities in all opportunities at a ind level in order to safely complete ADLs.  Patient's goals: To get better     Safety Devices  Safety Devices  Safety Devices in place: Yes  Type of devices: Chair alarm in place;Call light within reach;Nurse notified  Restraints Initially in Place: No    Pain Assessment  Patient does not c/o pain.    Pain Re-assessment  Patient does not c/o pain.    Therapy Time  SLP Individual Minutes  Time In: 1038  Time Out: 1056  Minutes: 18             Signature: Electronically signed by HOOD Nunez on 11/20/2024 at 12:07 PM

## 2024-11-20 NOTE — PROGRESS NOTES
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  Repeat CTs are stable  Followed by neurosurgery  Neurochecks  Discussed plan of care with patient's RN   Follow MRI brain   DVT prophylaxis-SCDs, no anticoagulation until okay by neurosurgery/neurology    7 pm- 7 am, please contact on call Hospitalist for any needs     Subjective:      no new events.     Physical Examination:      Vitals:  BP (!) 158/75   Pulse 73   Temp 97.8 °F (36.6 °C) (Oral)   Resp 15   Ht 1.702 m (5' 7\")   Wt 52.6 kg (116 lb)   SpO2 93%   BMI 18.17 kg/m²   Temp (24hrs), Av °F (36.7 °C), Min:97.8 °F (36.6 °C), Max:98.3 °F (36.8 °C)      CONSTITUTIONAL:  awake, alert, cooperative, no apparent distress, and appears stated age  HEENT: Normocephalic, PERRLA  NECK: no JVD, no LAD  HEART: RRR, no murmurs, gallops, or rubs  LUNGS: clear to auscultation bilaterally, no wheezes, crackles, or rhonchi.  ABDOMEN: soft/NT/ND, positive BS  MUSCULOSKELETAL: negative for edema, +2 pulses  SKIN: intact without rash or jaundice  NEURO: Alert and oriented x 3, strength 5/5 bilateral upper extremities, strength 5 out of 5 left lower extremity, strength 0/5 right lower extremity.  Sensation intact and symmetric throughout.  Speech normal.    Data:     Labs:  Recent Labs     24  0350   WBC 6.4 6.6   HGB 12.4 13.5    202     Recent Labs     24  0349    134*   K 3.6 3.6    97   CO2 25 27   BUN 10 10   CREATININE 0.37* 0.37*   GLUCOSE 100* 97     Recent Labs

## 2024-11-20 NOTE — CARE COORDINATION
This VIANNEYW completed quality rounds with ICU care management team.  Patient awake and alert.  Met with patient at bedside, patient would like to go [facility], here at [place] as her first choice.  Patient is very active and is motivated to get better and go home.  Electronically signed by RL Hill on 11/20/24 at 10:20 AM EST

## 2024-11-20 NOTE — PROGRESS NOTES
Report received, patient in bed resting. NIHSS assessment done, scored 6. C/o 5/10 pain on R side where patient fell, PRN tylenol administered. HS Medications administered. Q4H neuro checks completed throughout night with minimal to no change.  systolic at 0630, PRN Labetalol administered.    151

## 2024-11-20 NOTE — PROGRESS NOTES
TECHNIQUE: CT of the head was performed without the administration of intravenous contrast. Automated exposure control, iterative reconstruction, and/or weight based adjustment of the mA/kV was utilized to reduce the radiation dose to as low as reasonably achievable.  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  There is mild surrounding edema. No midline shift is seen..  Mild periventricular white matter changes are again seen most consistent with small vessel disease.  There is no evidence of hydrocephalus. ORBITS: The visualized portion of the orbits demonstrate no acute abnormality. SINUSES: The visualized paranasal sinuses and mastoid air cells demonstrate no acute abnormality. SOFT TISSUES/SKULL:  No acute abnormality of the visualized skull or soft tissues.     A 2 cm hematoma is again seen in the left frontal region near the vertex. There is no significant change when compared to previous study..     CT HEAD WO CONTRAST    Result Date: 11/19/2024  EXAMINATION: CT OF THE HEAD WITHOUT CONTRAST  11/19/2024 5:26 am TECHNIQUE: CT of the head was performed without the administration of intravenous contrast. Automated exposure control, iterative reconstruction, and/or weight based adjustment of the mA/kV was utilized to reduce the radiation dose to as low as reasonably achievable. COMPARISON: 11/18/2024 HISTORY: ORDERING SYSTEM PROVIDED HISTORY: ICH TECHNOLOGIST PROVIDED HISTORY: Reason for exam:->ICH Has a \"code stroke\" or \"stroke  posterior facets of the lower lumbar spine.  There is anterior spondylosis. No significant listhesis. The SI joints are intact. No acute fractures     Multilevel degenerative disc disease. No acute fractures.     CT THORACIC SPINE WO CONTRAST    Result Date: 11/18/2024  EXAMINATION: CT OF THE THORACIC SPINE WITHOUT CONTRAST  11/18/2024 9:37 am: TECHNIQUE: CT of the thoracic spine was performed without the administration of intravenous contrast. Multiplanar reformatted images are provided for review. Automated exposure control, iterative reconstruction, and/or weight based adjustment of the mA/kV was utilized to reduce the radiation dose to as low as reasonably achievable. COMPARISON: None. HISTORY: ORDERING SYSTEM PROVIDED HISTORY: fall TECHNOLOGIST PROVIDED HISTORY: Reason for exam:->fall What reading provider will be dictating this exam?->CRC FINDINGS: There is a mild dorsal kyphosis. There is multilevel degenerative change with bridging osteophytes of the thoracic spine. No significant listhesis. No focal bony abnormality. No acute fracture. In the field of view there is a 1.3 cm cystic nodule in left lobe of the thyroid.     1. Multilevel degenerative change of the thoracic spine. No acute fracture. 2. 1.3 cm cystic nodule in left lobe of the thyroid.  Follow-up     CT HEAD WO CONTRAST    Result Date: 11/18/2024  EXAMINATION: CT OF THE HEAD WITHOUT CONTRAST  11/18/2024 8:15 am TECHNIQUE: CT of the head was performed without the administration of intravenous contrast. Automated exposure control, iterative reconstruction, and/or weight based adjustment of the mA/kV was utilized to reduce the radiation dose to as low as reasonably achievable. COMPARISON: None HISTORY: ORDERING SYSTEM PROVIDED HISTORY: Stroke TECHNOLOGIST PROVIDED HISTORY: Has a \"code stroke\" or \"stroke alert\" been called?->Yes Reason for exam:->Stroke Decision Support Exception - unselect if not a suspected or confirmed emergency medical

## 2024-11-20 NOTE — FLOWSHEET NOTE
Pt arrived via cart/transport. A&Ox4, VSS, assessment complete. Oriented to room, bed in low position, x2 siderails, call light within reach, care ongoing.

## 2024-11-20 NOTE — PROGRESS NOTES
Physician Progress Note      PATIENT:               MARICRUZ GARCIA  CSN #:                  [id number]  :                       1950  ADMIT DATE:       2024 7:59 AM  DISCH DATE:  RESPONDING  PROVIDER #:        Willy Dasilva MD          QUERY TEXT:    Pt admitted with left frontal ICH.  Pt noted to have cytotoxic edema on CT   head .  If clinically significant, please document in progress notes and   discharge summary if you are evaluating/treating any of the following:    The medical record reflects the following:  Risk Factors: left frontal IPH  Clinical Indicators:  Ct head showed Stable 2 cm acute superior left   frontal lobe intraparenchymal hematoma with grossly stable surrounding   cytotoxic edema. No appreciable localized mass effect or midline shift.  Treatment: Keppra, Cardene gtt, Labetalol, CT head, neurochecks, ICU   monitoring, neurology/NSGY consults,    Thank you,  Ashleigh Goodrich   Clinical Documentation Improvement Specialist  W: [phone]  Options provided:  -- Cerebral edema  -- Other - I will add my own diagnosis  -- Disagree - Not applicable / Not valid  -- Disagree - Clinically unable to determine / Unknown  -- Refer to Clinical Documentation Reviewer    PROVIDER RESPONSE TEXT:    This patient has cerebral edema.    Query created by: Fly Goodrich on 2024 8:34 AM      Electronically signed by:  Willy Dasilva MD 2024 9:36 AM

## 2024-11-20 NOTE — PROGRESS NOTES
PHARMACY NOTE:   Interdisciplinary Rounds Completed     ICU Day #2  Pt diagnosis: ICH    Follow up/Changes:       Cardene drip discontinued    Home meds in need of review/reorder as appropriate: --    Renal:      Recent Labs     11/18/24 0826 11/19/24 0418 11/20/24  0349   CREATININE 0.41* 0.37* 0.37*      Estimated Creatinine Clearance: 112 mL/min (A) (based on SCr of 0.37 mg/dL (L)).     Additional Information/Core Measures:      DVT Prophylaxis/Anticoagulant Therapy: --  Recent Labs     11/18/24 0826 11/19/24 0418 11/20/24  0350   HGB 13.3 12.4 13.5    198 202     Recent Labs     11/18/24 0826   INR 0.8     Stress Ulcer Prophylaxis:   [] Pantoprazole   [] Famotidine  Steroid:   [] Solu-medrol  [] Solu-cortef  [] Prednisone  [] Decadron  Insulin Coverage (goal: 140-180):   Recent Labs     11/18/24 0826 11/19/24 0418 11/20/24 0349   GLUCOSE 90 100* 97     Lantus: --  SSI: --  Humalog: -- units required in the past 24 hours  Pressors:   [] Levophed  [] Epinephrine  [] Vasopressin  [] Andi-Synephrine  Sedation:   [] Precedex  [] Fentanyl  [] Propofol  Fluids:  --  Drips:  --  Antimicrobial Therapy:  Recent Labs     11/18/24 0826 11/19/24 0418 11/20/24  0350   WBC 8.6 6.4 6.6     No results for input(s): \"PROCAL\" in the last 72 hours.  ID on consult: No  Antimicrobial agents:   --  Cultures:  --  Bowel Regimen:   [x] Miralax  [x] Colace  [] Lactulose  Core measures assessed/met    Miladis Martin, PharmD, BCPS  11/20/2024 12:13 PM

## 2024-11-20 NOTE — PROGRESS NOTES
Physical Therapy Med Surg Daily Treatment Note  Facility/Department: [facility] ICU  Room: [place] [address]       NAME: Anahi Willett  : 1950 (74 y.o.)  MRN: [id number]  CODE STATUS: Full Code    Date of Service: 2024    Patient Diagnosis(es): Intracranial hemorrhage (HCC) [I62.9]  Right leg weakness [R29.898]  Hemorrhagic cerebrovascular accident (CVA) (HCC) [I61.9]   Chief Complaint   Patient presents with    Fall    Loss of Consciousness     Patient Active Problem List    Diagnosis Date Noted    Intracranial hemorrhage (HCC) 2024    Hemorrhagic cerebrovascular accident (CVA) (HCC) 2024    Precordial chest pain 10/15/2023        Past Medical History:   Diagnosis Date    Cervical cancer (HCC)     COPD (chronic obstructive pulmonary disease) (HCC)     Hypertension      Past Surgical History:   Procedure Laterality Date    APPENDECTOMY      CARPAL TUNNEL RELEASE Left     HYSTERECTOMY (CERVIX STATUS UNKNOWN)              Restrictions:fall risk       SUBJECTIVE:   Subjective: \"I just want to get moving again\"    Pain  Pain: denies pain    OBJECTIVE:   Orientation  Overall Orientation Status: Within Functional Limits         Transfer Training  Transfer Training: Yes  Overall Level of Assistance: Minimum assistance  Interventions: Safety awareness training;Verbal cues;Manual cues  Sit to Stand: Minimum assistance  Stand to Sit: Minimum assistance    Overall Level of Assistance: Moderate assistance;Assist X2  Distance (ft): 3 Feet  Assistive Device: None (HHA)  Interventions: Weight shifting training/pressure relief;Tactile cues;Safety awareness training  Base of Support: Narrowed  Speed/Sharmin: Delayed  Step Length: Left shortened  Swing Pattern: Left asymmetrical  Stance: Right decreased  Gait Abnormalities: Foot drop  Right Side Weight Bearing: As tolerated  Left Side Weight Bearing: As tolerated  Pt is unable to advance her right leg without assistance. She overcompensates with left leg by

## 2024-11-20 NOTE — PROGRESS NOTES
[facility]   Facility/Department: [facility]  Speech Language Pathology  Clinical Bedside Swallow Evaluation    NAME:Anahi Willett  : 1950 (74 y.o.)   [x]   confirmed    MRN: [id number]  ROOM: [place] [address]  ADMISSION DATE: 2024  PATIENT DIAGNOSIS(ES): Intracranial hemorrhage (HCC) [I62.9]  Right leg weakness [R29.898]  Hemorrhagic cerebrovascular accident (CVA) (HCC) [I61.9]  Chief Complaint   Patient presents with    Fall    Loss of Consciousness     Patient Active Problem List    Diagnosis Date Noted    Intracranial hemorrhage (HCC) 2024    Hemorrhagic cerebrovascular accident (CVA) (HCC) 2024    Precordial chest pain 10/15/2023     Past Medical History:   Diagnosis Date    Cervical cancer (HCC)     COPD (chronic obstructive pulmonary disease) (HCC)     Hypertension      Past Surgical History:   Procedure Laterality Date    APPENDECTOMY      CARPAL TUNNEL RELEASE Left     HYSTERECTOMY (CERVIX STATUS UNKNOWN)       No Known Allergies    DATE ONSET: 24    Date of Evaluation: 2024   Evaluating Therapist: HOOD Nunez    Dysphagia Diagnosis  Dysphagia Diagnosis: Swallow function appears WFL;No clinical indicators of dysphagia  Dysphagia Impression : No clinical indicators of oral dysphagia or pharyngeal dysphagia. Patient's prognosis for diet tolerance is good.  Per the results of this assessment, recommend patient to resume baseline diet of regular solids and thin liquids.    Recommended Diet     Diet Solids Recommendation: Regular  Liquid Consistency Recommendation: Thin  Recommended Form of Meds: PO     Reason for Referral  Anahi Willett was referred for a bedside swallow evaluation to assess the efficiency of her swallow function, identify signs and symptoms of aspiration, identify risk factors, and make recommendations regarding safe dietary consistencies, effective compensatory strategies, and safe eating environment.    General  Chart Reviewed:

## 2024-11-21 ENCOUNTER — APPOINTMENT (OUTPATIENT)
Dept: MRI IMAGING | Age: 74
End: 2024-11-21
Payer: MEDICARE

## 2024-11-21 LAB
ANION GAP SERPL CALCULATED.3IONS-SCNC: 10 MEQ/L (ref 9–15)
BUN SERPL-MCNC: 15 MG/DL (ref 8–23)
CALCIUM SERPL-MCNC: 9.3 MG/DL (ref 8.5–9.9)
CHLORIDE SERPL-SCNC: 99 MEQ/L (ref 95–107)
CO2 SERPL-SCNC: 28 MEQ/L (ref 20–31)
CREAT SERPL-MCNC: 0.48 MG/DL (ref 0.5–0.9)
ERYTHROCYTE [DISTWIDTH] IN BLOOD BY AUTOMATED COUNT: 13.4 % (ref 11.5–14.5)
GLUCOSE SERPL-MCNC: 98 MG/DL (ref 70–99)
HCT VFR BLD AUTO: 38.3 % (ref 37–47)
HGB BLD-MCNC: 12.7 G/DL (ref 12–16)
MCH RBC QN AUTO: 30.2 PG (ref 27–31.3)
MCHC RBC AUTO-ENTMCNC: 33.2 % (ref 33–37)
MCV RBC AUTO: 91 FL (ref 79.4–94.8)
PLATELET # BLD AUTO: 217 K/UL (ref 130–400)
POTASSIUM SERPL-SCNC: 4 MEQ/L (ref 3.4–4.9)
RBC # BLD AUTO: 4.21 M/UL (ref 4.2–5.4)
SODIUM SERPL-SCNC: 137 MEQ/L (ref 135–144)
WBC # BLD AUTO: 6.4 K/UL (ref 4.8–10.8)

## 2024-11-21 PROCEDURE — 99232 SBSQ HOSP IP/OBS MODERATE 35: CPT | Performed by: INTERNAL MEDICINE

## 2024-11-21 PROCEDURE — 6370000000 HC RX 637 (ALT 250 FOR IP): Performed by: INTERNAL MEDICINE

## 2024-11-21 PROCEDURE — 97116 GAIT TRAINING THERAPY: CPT

## 2024-11-21 PROCEDURE — 99232 SBSQ HOSP IP/OBS MODERATE 35: CPT | Performed by: PSYCHIATRY & NEUROLOGY

## 2024-11-21 PROCEDURE — APPSS15 APP SPLIT SHARED TIME 0-15 MINUTES: Performed by: NURSE PRACTITIONER

## 2024-11-21 PROCEDURE — 6370000000 HC RX 637 (ALT 250 FOR IP): Performed by: PSYCHIATRY & NEUROLOGY

## 2024-11-21 PROCEDURE — 80048 BASIC METABOLIC PNL TOTAL CA: CPT

## 2024-11-21 PROCEDURE — 97535 SELF CARE MNGMENT TRAINING: CPT

## 2024-11-21 PROCEDURE — 70544 MR ANGIOGRAPHY HEAD W/O DYE: CPT

## 2024-11-21 PROCEDURE — 2580000003 HC RX 258: Performed by: INTERNAL MEDICINE

## 2024-11-21 PROCEDURE — 1210000000 HC MED SURG R&B

## 2024-11-21 PROCEDURE — 85027 COMPLETE CBC AUTOMATED: CPT

## 2024-11-21 PROCEDURE — 36415 COLL VENOUS BLD VENIPUNCTURE: CPT

## 2024-11-21 PROCEDURE — 70551 MRI BRAIN STEM W/O DYE: CPT

## 2024-11-21 RX ORDER — LORAZEPAM 2 MG/ML
1 INJECTION INTRAMUSCULAR ONCE
Status: DISCONTINUED | OUTPATIENT
Start: 2024-11-21 | End: 2024-11-22 | Stop reason: HOSPADM

## 2024-11-21 RX ADMIN — ACETAMINOPHEN 325MG 650 MG: 325 TABLET ORAL at 08:11

## 2024-11-21 RX ADMIN — ATORVASTATIN CALCIUM 20 MG: 20 TABLET, FILM COATED ORAL at 08:12

## 2024-11-21 RX ADMIN — SODIUM CHLORIDE, PRESERVATIVE FREE 10 ML: 5 INJECTION INTRAVENOUS at 21:24

## 2024-11-21 RX ADMIN — LEVETIRACETAM 1000 MG: 500 TABLET, FILM COATED ORAL at 08:11

## 2024-11-21 RX ADMIN — POLYETHYLENE GLYCOL 3350 17 G: 17 POWDER, FOR SOLUTION ORAL at 08:10

## 2024-11-21 RX ADMIN — DOCUSATE SODIUM 100 MG: 100 CAPSULE, LIQUID FILLED ORAL at 21:24

## 2024-11-21 RX ADMIN — AMLODIPINE BESYLATE 10 MG: 10 TABLET ORAL at 08:11

## 2024-11-21 RX ADMIN — SODIUM CHLORIDE, PRESERVATIVE FREE 10 ML: 5 INJECTION INTRAVENOUS at 08:15

## 2024-11-21 RX ADMIN — Medication 500 MG: at 08:12

## 2024-11-21 RX ADMIN — DOCUSATE SODIUM 100 MG: 100 CAPSULE, LIQUID FILLED ORAL at 08:12

## 2024-11-21 RX ADMIN — ACETAMINOPHEN 325MG 650 MG: 325 TABLET ORAL at 16:13

## 2024-11-21 RX ADMIN — LEVETIRACETAM 1000 MG: 500 TABLET, FILM COATED ORAL at 21:24

## 2024-11-21 RX ADMIN — LOSARTAN POTASSIUM 100 MG: 100 TABLET, FILM COATED ORAL at 08:12

## 2024-11-21 RX ADMIN — Medication 5 MG: at 21:24

## 2024-11-21 ASSESSMENT — ENCOUNTER SYMPTOMS
WHEEZING: 0
SHORTNESS OF BREATH: 0
COUGH: 0
CHEST TIGHTNESS: 0
VOMITING: 0
NAUSEA: 0
COLOR CHANGE: 0
TROUBLE SWALLOWING: 0

## 2024-11-21 ASSESSMENT — PAIN DESCRIPTION - LOCATION
LOCATION: BACK
LOCATION: BACK

## 2024-11-21 ASSESSMENT — PAIN SCALES - GENERAL
PAINLEVEL_OUTOF10: 6
PAINLEVEL_OUTOF10: 4
PAINLEVEL_OUTOF10: 0

## 2024-11-21 ASSESSMENT — PAIN DESCRIPTION - DESCRIPTORS
DESCRIPTORS: ACHING
DESCRIPTORS: SHARP;JABBING

## 2024-11-21 ASSESSMENT — PAIN SCALES - WONG BAKER: WONGBAKER_NUMERICALRESPONSE: NO HURT

## 2024-11-21 NOTE — PROGRESS NOTES
Neurology Follow up    SUBJECTIVE: Patient seen and examined for neurology follow-up.  Has been transferred to the ICU.  Alert and oriented x 3, no acute distress, cooperative.  Denies headache.  Afebrile.  No seizure activity overnight.  Nonfocal.    Exam as noted above.  Continued right lower extremity weakness with a monoplegia.  Current Facility-Administered Medications   Medication Dose Route Frequency Provider Last Rate Last Admin    LORazepam (ATIVAN) injection 1 mg  1 mg IntraVENous Once Cecilia Gaviria, APRN - CNP        lidocaine 4 % external patch 1 patch  1 patch TransDERmal Daily Amando Mistry MD   1 patch at 11/21/24 0810    levETIRAcetam (KEPPRA) tablet 1,000 mg  1,000 mg Oral BID Amando Mistry MD   1,000 mg at 11/21/24 0811    docusate sodium (COLACE) capsule 100 mg  100 mg Oral BID Wili Conner MD   100 mg at 11/21/24 0812    amLODIPine (NORVASC) tablet 10 mg  10 mg Oral Daily Wili Conner MD   10 mg at 11/21/24 0811    polyethylene glycol (GLYCOLAX) packet 17 g  17 g Oral Daily Wili Conner MD   17 g at 11/21/24 0810    sodium chloride flush 0.9 % injection 5-40 mL  5-40 mL IntraVENous 2 times per day Maddie Becker R, DO   10 mL at 11/21/24 0815    sodium chloride flush 0.9 % injection 5-40 mL  5-40 mL IntraVENous PRN Maddie Becker R, DO        0.9 % sodium chloride infusion   IntraVENous PRN Maddie Becker R, DO        acetaminophen (TYLENOL) tablet 650 mg  650 mg Oral Q6H PRN Maddie Becker R, DO   650 mg at 11/21/24 0811    Or    acetaminophen (TYLENOL) suppository 650 mg  650 mg Rectal Q6H PRN Maddie Becker R, DO        ondansetron (ZOFRAN-ODT) disintegrating tablet 4 mg  4 mg Oral Q8H PRN Maddie Becker R, DO        Or    ondansetron (ZOFRAN) injection 4 mg  4 mg IntraVENous Q6H PRN Maddie Becker R, DO        labetalol (NORMODYNE;TRANDATE) injection 10 mg  10 mg IntraVENous Q10 Min PRN Maddie Becker R,    10 mg at 11/20/24 0635    atorvastatin (LIPITOR) tablet 20 mg   levels.        Pinprick             Right Upper Extremity:  normal             Left Upper Extremity:  normal             Right Lower Extremity:  normal             Left Lower Extremity:  normal           Vibration                         Touch            Proprioception                 Coordination: Unable to perform any movement in the right lower extremity.          Finger/Nose   Right:  normal              Left:  normal                  Gait:                       Casual: Patient able to stand with therapy and may have taken a few steps.         Romberg:  normal            Reflexes:             Deep Tendon Reflexes:             Reflexes are 2 +             Plantar response:                Right:  downgoing               Left:  downgoing    Vascular:  Cardiac Exam:  normal         CT HEAD WO CONTRAST    Result Date: 11/19/2024  EXAMINATION: CT OF THE HEAD WITHOUT CONTRAST  11/19/2024 11:44 am TECHNIQUE: CT of the head was performed without the administration of intravenous contrast. Automated exposure control, iterative reconstruction, and/or weight based adjustment of the mA/kV was utilized to reduce the radiation dose to as low as reasonably achievable.  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  There is mild surrounding edema. No midline shift is seen..  Mild periventricular white matter changes are again seen most consistent with small vessel disease.  There is no evidence of hydrocephalus. ORBITS: The visualized portion of the

## 2024-11-21 NOTE — CARE COORDINATION
[facility], referral called to Wen with [facility].

## 2024-11-21 NOTE — CARE COORDINATION
[facility] Pre-Admission Screening Document      Patient Name: Anahi Willett       MRN: [id number]    : 1950    Age: 74 y.o.  Gender: female   Payor: Payor: [employer]AGE / Plan: [employer] CHOICE HMO / Product Type: *No Product type* /   MSSP: No    Admitted from: [facility] Floor: 2west  Attending Care Provider: Maddie Becker DO  Inpatient Rehab Referring Care Provider: Maddie Becker DO   Primary Care Provider: Fahad Mayorga DO  Inpatient Treatment Team including Consults: Treatment Team:   Maddie Becker DO Patel, Dhruv R, MD Zaizafoun, Manaf, MD West, Robin M, RN Whitworth, Taylor, RN Diaz, Kimberly, RN    Reason for Hospitalization:   1. Hemorrhagic cerebrovascular accident (CVA) (HCC)    2. Right leg weakness      Chief Complaint   Patient presents with    Fall    Loss of Consciousness     Isolation:No active isolations    Hospital Course:  Admit Date: 2024  7:59 AM  Inpatient Rehab Referral Date: 2024  Narrative of hospital course/history of present illness:  Patient came to ER she has past medical history of hypertension hyperlipidemia who presents to the hospital with a fall.  Patient reports that she was walking and did not have any symptoms prior when she fell down to the ground hit her head and her right side.  She was found to have 2 cm intracranial hemorrhage within the left frontal lobe in the ER.  Neurology and neurosurgery was already contacted.  Neurosurgery seen the patient.  Patient denies loss of consciousness.  No fever or chills.  No numbness or tingling but she does have weakness in her right lower extremity.  She is not able to move her right lower extremity.  She is not able to walk.     Medical  Assessment, plan  Intracranial hemorrhage, likely stroke with hemorrhagic transformation  Hypertensive emergency secondary to #1  Thyroid nodule  COPD, no  hrs/day  Rehabilitation Nursing   Case management/Social work  Oxygen/CPAP/BiPAP    Cultural needs:   Ethnic, Cultural, Spiritual, and Caodaism Needs  Do you have any ethnic, cultural, Caodaism practices or restrictions we should know about during your stay in the hospital?  : No  Are you able to do the things that help you spiritually even though you are sick? : No  How often do you feel lonely or isolated from those around you?: Never  Do you need support with your Caodaism or spiritual needs?: No    Funding needs:   Potential Assistance Purchasing Medications: No      Expected Level of Improvement with Rehab  Assist for ADL Supervision / Standby Assist  Assist for Transfers [place]  Assist for Gait [place]    Patient's willingness to participate: Yes  Patient's ability to tolerate proposed care: Yes  Patient/Family Goals of Rehab (in patient's/family's own words): Get stronger then return home    Anticipated Discharge Plan:  Home with   Home Health, RN PT OT SLP Aide to be determined       Barriers to Discharge:  Home entry accessibility  Bathroom accessibility  Bedroom accessibility  Equipment needs  Caregiver availability  Resource availability      Rehab evaluation plan: Recommend Acute Inpatient Rehab  Rehabilitation Impairment Group Code: 1.2  Rehab Impairment Group: Neurologic: Cerebral Bleed / Subarachnoid Hemorrhage / Subdural Hematoma  Estimated Length of Stay (days): 20  Rehab Diagnosis: Impaired mobility and ADL's due to Traumatic left frontal intracerebral hemorrhage    Reviewer's Signature: Electronically signed by Wen Gomes RN on 11/21/24 at 5:50 PM EST     I have reviewed and concur with the above Preadmission Screening.   Rehab Admitting Doctor: Dr. Trihs Thacker MD

## 2024-11-21 NOTE — PROGRESS NOTES
Pulmonary progress Note    2024 4:07 PM    Subjective:   Admit Date: 2024  PCP: Fahad Mayorga DO    Chief Complaint   Patient presents with    Fall    Loss of Consciousness     Interval History: Transferred out of the ICU.  She is doing relatively well.  Respiratory status is stable.  She is currently on room air.    14 points review of systems has been obtained and negative except to was mentioned in HPI.     Medications:   Scheduled Meds:   LORazepam  1 mg IntraVENous Once    lidocaine  1 patch TransDERmal Daily    levETIRAcetam  1,000 mg Oral BID    docusate sodium  100 mg Oral BID    amLODIPine  10 mg Oral Daily    polyethylene glycol  17 g Oral Daily    sodium chloride flush  5-40 mL IntraVENous 2 times per day    atorvastatin  20 mg Oral Daily    losartan  100 mg Oral Daily    melatonin  5 mg Oral Nightly    vitamin C  500 mg Oral Daily     Continuous Infusions:   sodium chloride           Objective:   Vitals:   Temp (24hrs), Av °F (36.7 °C), Min:97.7 °F (36.5 °C), Max:98.6 °F (37 °C)    BP (!) 125/59   Pulse 81   Temp 97.7 °F (36.5 °C) (Oral)   Resp 18   Ht 1.702 m (5' 7\")   Wt 52.6 kg (116 lb)   SpO2 95%   BMI 18.17 kg/m²   I/O:24HR INTAKE/OUTPUT:    Intake/Output Summary (Last 24 hours) at 2024 1607  Last data filed at 2024 1423  Gross per 24 hour   Intake 490 ml   Output 1400 ml   Net -910 ml      0701 -  0700  In: 240 [P.O.:240]  Out: 1350 [Urine:1350]  CVP:        Physical Exam:  General appearance - alert,.  Appearing, and in mild distress  Mental status - alert, oriented to person, place, and time  Eyes -bruises around right eye  Nose - normal and patent, no erythema   Neck - supple, no significant adenopathy  Chest - clear to auscultation, no wheezes, rales or rhonchi, symmetric air entry  Heart - normal rate, regular rhythm, normal S1, S2, no murmurs, rubs, clicks or gallops  Abdomen - soft, nontender, nondistended, no masses or organomegaly  Rectal -  deferred, not clinically indicated  Neurological - alert, oriented, normal speech, no focal findings   Musculoskeletal - no joint tenderness, deformity or swelling  Extremities - peripheral pulses normal, no pedal edema, no clubbing or cyanosis  Skin - normal coloration and turgor, no rashes, no suspicious skin lesions noted              BMP:    Recent Labs     11/19/24  0418 11/20/24  0349 11/21/24  0512    134* 137   K 3.6 3.6 4.0    97 99   CO2 25 27 28   BUN 10 10 15   CREATININE 0.37* 0.37* 0.48*   GLUCOSE 100* 97 98    .  MG:3,PHOS:3)@  Ionized Calcium: No components found for: \"IONCA\"  CBC:   Recent Labs     11/20/24  0350 11/21/24  0512   WBC 6.6 6.4   HGB 13.5 12.7    217      ABG: No results for input(s): \"PH\", \"PCO2\", \"PO2\" in the last 72 hours.        Assessment and Plan:       Impression:    -COPD.  Does not seem to be in exacerbation.  -Hypertensive emergency.  Blood pressure is better.  -Intracranial hemorrhage due to #2.  -?  Sleep disordered breathing.    Recommendations:    - Respiratory status is stable.  Bronchodilators as needed.  - Blood pressure control.  - Neurochecks per protocol.  - Strict intake and output measurement.  Watch urine output closely.  -Consider testing for sleep disordered breathing as an outpatient.       Electronically signed by Shemar Obrien MD on 11/21/2024 at 4:07 PM

## 2024-11-21 NOTE — PROGRESS NOTES
Internal Medicine   Hospitalist   Progress Note    2024   2:21 PM    Name:  Anahi Willett  MRN:    [id number]     IP Day: 3     Admit Date: 2024  7:59 AM  PCP: Fahad Mayorga DO    Code Status:  Full Code    Assessment and Plan:        Active Problems/ diagnosis:       ICH within the left frontal lobe-right hemiparesis, mainly in the lower extremity  Acute stroke   Focal seizure  HTN - uncontrolled  HLD     Plan  Working with therapy.  Plan for rehab once approved.  Awaiting MRI of the brain.  Neurologist is following  Followed by neurosurgery  Neurochecks  Discussed plan of care with patient's RN   Follow MRI brain   DVT prophylaxis-SCDs, no anticoagulation until okay by neurosurgery/neurology    7 pm- 7 am, please contact on call Hospitalist for any needs     Subjective:      no new events.     Physical Examination:      Vitals:  /75   Pulse 82   Temp 97.7 °F (36.5 °C) (Oral)   Resp 18   Ht 1.702 m (5' 7\")   Wt 52.6 kg (116 lb)   SpO2 95%   BMI 18.17 kg/m²   Temp (24hrs), Av.1 °F (36.7 °C), Min:97.7 °F (36.5 °C), Max:98.6 °F (37 °C)      CONSTITUTIONAL:  awake, alert, cooperative, no apparent distress, and appears stated age  HEENT: Normocephalic, PERRLA  NECK: no JVD, no LAD  HEART: RRR, no murmurs, gallops, or rubs  LUNGS: clear to auscultation bilaterally, no wheezes, crackles, or rhonchi.  ABDOMEN: soft/NT/ND, positive BS  MUSCULOSKELETAL: negative for edema, +2 pulses  SKIN: intact without rash or jaundice  NEURO: Alert and oriented x 3, strength 5/5 bilateral upper extremities, strength 5 out of 5 left lower extremity, strength 0/5 right lower extremity.  Sensation intact and symmetric throughout.  Speech normal.    Data:     Labs:  Recent Labs     24  0350 24  0512   WBC 6.6 6.4   HGB 13.5 12.7    217     Recent Labs     24  0349 24  0512   * 137   K 3.6 4.0   CL 97 99   CO2 27 28   BUN 10 15   CREATININE 0.37* 0.48*   GLUCOSE 97 98  Nightly Maddie Becker DO   5 mg at 11/20/24 2055    ascorbic acid (VITAMIN C) tablet 500 mg  500 mg Oral Daily Maddie Becker DO   500 mg at 11/21/24 0812       New information updated in the note today, rest of the examination did not change compared to yesterday.    Additional work up or/and treatment plan may be added today or then after based on clinical progression. I am managing a portion of pt care. Some medical issues are handled by other specialists. Additional work up and treatment should be done in out pt setting by pt PCP and other out pt providers.      In addition to examining and evaluating pt, I spent additional time explaining care, normaland abnormal findings, and treatment plan. All of pt questions were answered. Counseling, diet and education were provided. Case will be discussed with nursing staff when appropriate. Family will be updated if and when appropriate.       Electronically signed by Maddie Becker DO on 11/21/2024 at 2:21 PM

## 2024-11-21 NOTE — PLAN OF CARE
Problem: Discharge Planning  Goal: Discharge to home or other facility with appropriate resources  11/21/2024 1113 by Ramandeep Prather RN  Outcome: Progressing     Problem: Pain  Goal: Verbalizes/displays adequate comfort level or baseline comfort level  11/21/2024 1113 by Ramandeep Prather RN  Outcome: Progressing     Problem: Neurosensory - Adult  Goal: Achieves stable or improved neurological status  11/21/2024 1113 by Ramandeep Prather RN  Outcome: Progressing     Problem: Neurosensory - Adult  Goal: Absence of seizures  11/21/2024 1113 by Ramandeep Prather RN  Outcome: Progressing     Problem: Neurosensory - Adult  Goal: Achieves maximal functionality and self care  11/21/2024 1113 by Ramandeep Prather RN  Outcome: Progressing     Problem: Respiratory - Adult  Goal: Achieves optimal ventilation and oxygenation  11/21/2024 1113 by Ramandeep Prather RN  Outcome: Progressing     Problem: Cardiovascular - Adult  Goal: Maintains optimal cardiac output and hemodynamic stability  11/21/2024 1113 by Ramandeep Prather RN  Outcome: Progressing     Problem: Cardiovascular - Adult  Goal: Absence of cardiac dysrhythmias or at baseline  11/21/2024 1113 by Ramandeep Prather RN  Outcome: Progressing     Problem: Skin/Tissue Integrity - Adult  Goal: Skin integrity remains intact  11/21/2024 1113 by Ramandeep Prather RN  Outcome: Progressing     Problem: Musculoskeletal - Adult  Goal: Return mobility to safest level of function  11/21/2024 1113 by Ramandeep Prather RN  Outcome: Progressing     Problem: Gastrointestinal - Adult  Goal: Minimal or absence of nausea and vomiting  11/21/2024 1113 by Ramandeep Prather RN  Outcome: Progressing     Problem: Gastrointestinal - Adult  Goal: Maintains or returns to baseline bowel function  11/21/2024 1113 by Ramandeep Prather RN  Outcome: Progressing     Problem: Gastrointestinal - Adult  Goal: Maintains adequate nutritional intake  11/21/2024 1113 by Ramandeep Prather  RN  Outcome: Progressing     Problem: Genitourinary - Adult  Goal: Absence of urinary retention  11/21/2024 1113 by Ramandeep Prather RN  Outcome: Progressing     Problem: Infection - Adult  Goal: Absence of infection at discharge  11/21/2024 1113 by Ramandeep Prather RN  Outcome: Progressing     Problem: Metabolic/Fluid and Electrolytes - Adult  Goal: Electrolytes maintained within normal limits  11/21/2024 1113 by Ramandeep Prather RN  Outcome: Progressing     Problem: Metabolic/Fluid and Electrolytes - Adult  Goal: Hemodynamic stability and optimal renal function maintained  11/21/2024 1113 by Ramandeep Prather RN  Outcome: Progressing     Problem: Metabolic/Fluid and Electrolytes - Adult  Goal: Glucose maintained within prescribed range  11/21/2024 1113 by Ramandeep Prather RN  Outcome: Progressing     Problem: Hematologic - Adult  Goal: Maintains hematologic stability  11/21/2024 1113 by Ramandeep Prather RN  Outcome: Progressing     Problem: Self Care Deficit  Goal: Return ADL status to a safe level of function  Description: INTERVENTIONS:  1. Administer medication as ordered  2. Assess ADL deficits and provide assistive devices as needed  3. Obtain PT/OT consults as needed  4. Assist and instruct patient to increase activity and self care as tolerated  11/21/2024 1113 by Ramandeep Prather RN  Outcome: Progressing     Problem: Chronic Conditions and Co-morbidities  Goal: Patient's chronic conditions and co-morbidity symptoms are monitored and maintained or improved  11/21/2024 1113 by Ramandeep Prather RN  Outcome: Progressing     Problem: Skin/Tissue Integrity  Goal: Absence of new skin breakdown  Description: 1.  Monitor for areas of redness and/or skin breakdown  2.  Assess vascular access sites hourly  3.  Every 4-6 hours minimum:  Change oxygen saturation probe site  4.  Every 4-6 hours:  If on nasal continuous positive airway pressure, respiratory therapy assess nares and determine need for

## 2024-11-21 NOTE — CONSULTS
Physical Medicine & Rehabilitation  Consult Note      Admitting Physician: Maddie Becker DO    Primary Care Provider: Fahad Mayorga DO     Reason for Consult:  Asses rehab needs, promote physical and mental function, analyze level of care to determine rehab needs, improve ability to actively participate in the rehabilitation process, and decrease likelihood of re-admit to the hospital after discharge.      History of Present Illness:    Anahi Willett is a 74 y.o. female admitted to [facility] on 11/18/2024.               HPI  Traumatic left frontal intracerebral hemorrhage , started on Keppra, proceding with imaging studies and BP monitoribng     I reviewed recent nursing notes discussed care with acute care providers, \" see notes  \".   Events from the previous 24 hours reviewed and discussed .      Their inpatient work up has included:    Imaging:  Imaging and other studies reviewed and discussed with patient and staff    MRV HEAD WO CONTRAST    Result Date: 11/21/2024  EXAMINATION: MRV OF THE HEAD WITHOUT CONTRAST  11/21/2024: TECHNIQUE: Multiplanar multisequence MRV of the head was performed without the administration of intravenous contrast. COMPARISON: MRI of the brain from today.  CT of the head from 11/19/2024. HISTORY: ORDERING SYSTEM PROVIDED HISTORY: venous CVA TECHNOLOGIST PROVIDED HISTORY: Reason for exam:->venous CVA What reading provider will be dictating this exam?->CRC FINDINGS: No focal stenosis or thrombus is seen of the major dural venous sinuses. No sign of any venous abnormality related to the known high left mid parietal intraparenchymal hematoma.     Normal MRV of the head.     MRA HEAD WO CONTRAST    Result Date: 11/21/2024  EXAMINATION: MRA OF THE HEAD WITHOUT CONTRAST 11/21/2024 12:17 pm TECHNIQUE: MRA of the head was performed utilizing time-of-flight imaging with MIP images. No intravenous contrast was administered. COMPARISON: None HISTORY: ORDERING  a day.  I reviewed the various options re: levels of care with the patient and family.  Please see pre-admission screen note for further details. I discussed acute rehab with the patient and verify that the patient is able and willing to participate in 3 hours of therapy a day.  Rehab and Acute Care Case Management has also reinforced this expectation.  This patient requires multidisciplinary rehabilitation treatment, including daily care and management from a PM&R physician, 24-hour rehabilitation nursing, Physical Therapy, Occupational Therapy, rehabilitation psychology, consideration of speech and language pathology, recreational therapy, nutritional services, and a rehabilitation .  I feel that it is reasonable to plan for a discharge to home setting after acute rehab.        Specialized nursing care to focus on:  Bowel and bladder issues-Monitor for urinary retention-check PVRs, bladder scan--cath if no void.  Wound risk and management   -pressure relief protocols-side to side turns  IVF medication administration      Monitor endurance and if necessary spread therapy out over a 7-day window-adding scheduled rest breaks when needed.  Focus on energy conservation.  Monitor heart rate and   cardiac medications effects on heart rate and blood pressure before, during and after therapy.  Progress toward endurance training with pulse ox monitoring for saturation and heart rate.    monitoring for dysphagia-- Improve hydration and nutrition by adding Vitamin B12 shot times one, adding Protein supplements and push PO fluids.    Treat and monitor for higher level cognitive deficits, focus on difficulty with sequencing and problem-solving.    Focus on higher-level balance and falls risk issues focusing on balance training and monitoring for orthostasis.      Above recommendations are indicated to address medical complexity and need for appropriate rehab services.  Will tailor individual care and rehab plan per  individuals needs re  .    Focus of today's plan-   transfer to acute rehab once stable       Required Certification Data (potential inpatient rehabilitation facility patient's only)    Deficits:weakness, nutrition, mobility, impaired gait, high risk for falls, frequent falls, decreased endurance, deconditioning , debility, cardiovascular compromise, balance, ADL's, adjustment to disability, impaired mobility level increasing the risk of venous thromboembolism, and healing surgical sites, pain limiting function, balance and righting reactions, cognitive deficits , and oropharyngeal dysphagia    Disability:mobility, locomotion, self care, bowel/ bladder status, and cognitive    Potential barriers to progress/discharge:complex medical conditions         It was my pleasure to evaluate Anahi Willett today.  Please call [phone] with questions.    Trish Thacker MD     FAAPMR

## 2024-11-21 NOTE — PROGRESS NOTES
[facility] OCCUPATIONAL THERAPY MED SURG TREATMENT NOTE     Date: 2024  Patient Name: Anahi Willett        MRN: [id number]  Account: [id number]   : 1950  (74 y.o.)  Room: [place] [address]    Chart Review:    Restrictions  Restrictions/Precautions  Restrictions/Precautions: Fall Risk     Safety:  Safety Devices  Type of Devices: Call light within reach;Left in bed;Bed alarm in place    Patient's birthday verified: Yes    Subjective: \"I am depressed.\"            Pain at start of treatment: No    Pain at end of treatment: No    Location: N/A  Description: N/A    Objective:  Pt began to cry during introductions with therapist. When questioned, pt shared her worries and fear about her current situation. Provided pt with therapeutic active listening and encouragement. Educated pt on liane services for someone to talk to through the process. Pt stated she would call the number on the board when she feels ready. Pt also shared she recently lost her  and son.     Therapy key for assistance levels -   Independent/Mod I = Pt. is able to perform task with no assistance but may require a device   Stand by assistance = Pt. does not perform task at an independent level but does not need physical assistance, requires verbal cues  Minimal, Moderate, Maximal Assistance = Pt. requires physical assistance (25%, 50%, 75% assist from helper) for task but is able to actively participate in task   Dependent = Pt. requires total assistance with task and is not able to actively participate with task completion    Orientation Status:  Orientation  Overall Orientation Status: Within Functional Limits  Orientation Level: Oriented to situation;Oriented to person;Oriented to place    Observation:  Observation/Palpation  Posture: Fair  Observation: Pt alert and cooperative. Bruising on R side of face. Sitting up in bedside chair upon arrival.    Cognition Status:  Cognition  Cognition Comment: follow one step and  multiple step commands with increased time, slightly implusive with decreased safety awareness noted.    GROSS ASSESSMENT AROM/PROM:  AROM:  (decreased AROM RUE, near WFL. WFL LUE.)     UE SENSATION:  Sensation: Intact    Functional Mobility:  Patient ambulated to head of bed with WW at Total A level. See transfer comments below.     Transfers:  Transfers  Sit to stand: Minimal assistance  Stand to sit: Minimal assistance  Transfer Comments: pt requested to lay in bed. 2 person A for short ambulation from chair to bed for safety. DEP for ambulation, PTA Aren present to provide A. Pt implusive and quick to achieve actions. MAX verbal CUES for correct and safe technique with poor follow through. RW used.    Bed Mobility  Bed mobility  Sit to Supine: Minimal assistance  Bed Mobility Comments: lifting RLE into bed, HOB slightly elevated.    Functional Endurance:  Activity Tolerance  Activity Tolerance: Treatment limited secondary to medical complications (free text);Patient Tolerated treatment well    Treatment consisted of:    ADL training    Assessment/Discharge Disposition:   Pt tolerated treatment fine. Pt reported she felt better after talking to therapist, however, believes she needs some time by herself to process the situation. Pt did well with short ambulation and transfer into bed. Pt demonstrates good behavior and motivation to progress. Pt would benefit from continued Occupational Therapy to increase strength, activity tolerance, [place] with functional transfers, and [place] with ADL/IADL completion.         [employer]  How much help is needed for putting on and taking off regular lower body clothing?: A Lot  How much help is needed for bathing (which includes washing, rinsing, drying)?: A Lot  How much help is needed for toileting (which includes using toilet, bedpan, or urinal)?: A Lot  How much help is needed for putting on and taking off regular upper body clothing?: None  How much help is

## 2024-11-21 NOTE — PLAN OF CARE
Problem: Discharge Planning  Goal: Discharge to home or other facility with appropriate resources  Outcome: Progressing     Problem: Pain  Goal: Verbalizes/displays adequate comfort level or baseline comfort level  Outcome: Progressing     Problem: Neurosensory - Adult  Goal: Achieves stable or improved neurological status  Outcome: Progressing  Goal: Absence of seizures  Outcome: Progressing  Goal: Achieves maximal functionality and self care  Outcome: Progressing     Problem: Respiratory - Adult  Goal: Achieves optimal ventilation and oxygenation  Outcome: Progressing     Problem: Cardiovascular - Adult  Goal: Maintains optimal cardiac output and hemodynamic stability  Outcome: Progressing  Goal: Absence of cardiac dysrhythmias or at baseline  Outcome: Progressing     Problem: Skin/Tissue Integrity - Adult  Goal: Skin integrity remains intact  Outcome: Progressing     Problem: Musculoskeletal - Adult  Goal: Return mobility to safest level of function  Outcome: Progressing     Problem: Gastrointestinal - Adult  Goal: Minimal or absence of nausea and vomiting  Outcome: Progressing  Goal: Maintains or returns to baseline bowel function  Outcome: Progressing  Goal: Maintains adequate nutritional intake  Outcome: Progressing     Problem: Genitourinary - Adult  Goal: Absence of urinary retention  Outcome: Progressing     Problem: Infection - Adult  Goal: Absence of infection at discharge  Outcome: Progressing     Problem: Metabolic/Fluid and Electrolytes - Adult  Goal: Electrolytes maintained within normal limits  Outcome: Progressing  Goal: Hemodynamic stability and optimal renal function maintained  Outcome: Progressing  Goal: Glucose maintained within prescribed range  Outcome: Progressing     Problem: Hematologic - Adult  Goal: Maintains hematologic stability  Outcome: Progressing

## 2024-11-21 NOTE — CARE COORDINATION
Inpatient Rehab referral received. Met with patient and explained [facility] Inpatient Rehab program and requirements, including 3 hours of intense therapy daily, anticipated length of stay and goal of discharge to home. All questions answered and patient verbalized understanding. Freedom of choice provided and patient requests admit to [facility] if appropriate and approved by insurance. PM&R consult pending.  The patient is from home alone she has a daughter that is local.  The patient is motivated and wants rehab to regain her independence at home. She was independent with adls/iadls prior to admission.   Electronically signed by Wen Gomes RN on 11/21/24 at 3:39 PM EST

## 2024-11-21 NOTE — PROGRESS NOTES
Total A, significant time spent educating pt on ARU and PT POC follow dc from medical floor. pt demos strong motivation and good carryover of education and cues.     Discharge Recommendations:  Continue to assess pending progress         Goals  Long Term Goals  Long Term Goal 1: Pt will demonstrate bed mobility SBA  Long Term Goal 2: Pt will demonstrate transfers min A with safest AD  Long Term Goal 3: Pt will demonstrate amb >/= 25ft mod A with safest AD  Long Term Goal 4: Pt will demonstrate static standing touching assistance with safest AD >/= 2 min    PLAN    General Plan: 1 time a day 7 days a week  Safety Devices  Type of Devices: Call light within reach, Chair alarm in place, Left in chair     AMPAC (6 CLICK) BASIC MOBILITY  AM-PAC Inpatient Mobility Raw Score : 10      Therapy Time   Individual   Time In 0952   Time Out 1015   Minutes 23      Gait: 15  Trsf: 8        Austin Arreola PTA, 11/21/24 at 11:58 AM         Definitions for assistance levels  Independent = pt does not require any physical supervision or assistance from another person for activity completion. Device may be needed.  Stand by assistance = pt requires verbal cues or instructions from another person, close to but not touching, to perform the activity  Minimal assistance= pt performs 75% or more of the activity; assistance is required to complete the activity  Moderate assistance= pt performs 50% of the activity; assistance is required to complete the activity  Maximal assistance = pt performs 25% of the activity; assistance is required to complete the activity  Dependent = pt requires total physical assistance to accomplish the task

## 2024-11-22 ENCOUNTER — APPOINTMENT (OUTPATIENT)
Dept: GENERAL RADIOLOGY | Age: 74
End: 2024-11-22
Payer: MEDICARE

## 2024-11-22 ENCOUNTER — HOSPITAL ENCOUNTER (INPATIENT)
Age: 74
LOS: 11 days | Discharge: HOME | DRG: 057 | End: 2024-12-03
Attending: PHYSICAL MEDICINE & REHABILITATION | Admitting: PHYSICAL MEDICINE & REHABILITATION
Payer: MEDICARE

## 2024-11-22 VITALS
WEIGHT: 116 LBS | BODY MASS INDEX: 18.21 KG/M2 | TEMPERATURE: 97.7 F | SYSTOLIC BLOOD PRESSURE: 109 MMHG | DIASTOLIC BLOOD PRESSURE: 61 MMHG | OXYGEN SATURATION: 96 % | RESPIRATION RATE: 18 BRPM | HEIGHT: 67 IN | HEART RATE: 82 BPM

## 2024-11-22 DIAGNOSIS — Z74.09 IMPAIRED MOBILITY AND ACTIVITIES OF DAILY LIVING: Primary | ICD-10-CM

## 2024-11-22 DIAGNOSIS — Z78.9 IMPAIRED MOBILITY AND ACTIVITIES OF DAILY LIVING: Primary | ICD-10-CM

## 2024-11-22 LAB
CHP ED QC CHECK: NORMAL
GLUCOSE BLD-MCNC: 113 MG/DL (ref 70–99)
GLUCOSE BLD-MCNC: NORMAL MG/DL
PERFORMED ON: ABNORMAL

## 2024-11-22 PROCEDURE — 97535 SELF CARE MNGMENT TRAINING: CPT

## 2024-11-22 PROCEDURE — APPSS15 APP SPLIT SHARED TIME 0-15 MINUTES: Performed by: NURSE PRACTITIONER

## 2024-11-22 PROCEDURE — 97116 GAIT TRAINING THERAPY: CPT

## 2024-11-22 PROCEDURE — 6370000000 HC RX 637 (ALT 250 FOR IP): Performed by: INTERNAL MEDICINE

## 2024-11-22 PROCEDURE — 2580000003 HC RX 258: Performed by: INTERNAL MEDICINE

## 2024-11-22 PROCEDURE — 1180000000 HC REHAB R&B

## 2024-11-22 PROCEDURE — 99232 SBSQ HOSP IP/OBS MODERATE 35: CPT | Performed by: PSYCHIATRY & NEUROLOGY

## 2024-11-22 PROCEDURE — 73502 X-RAY EXAM HIP UNI 2-3 VIEWS: CPT

## 2024-11-22 PROCEDURE — 72170 X-RAY EXAM OF PELVIS: CPT

## 2024-11-22 PROCEDURE — 6370000000 HC RX 637 (ALT 250 FOR IP): Performed by: PSYCHIATRY & NEUROLOGY

## 2024-11-22 RX ORDER — LIDOCAINE 4 G/G
1 PATCH TOPICAL DAILY
Status: CANCELLED | OUTPATIENT
Start: 2024-11-23

## 2024-11-22 RX ORDER — LOSARTAN POTASSIUM 100 MG/1
100 TABLET ORAL DAILY
Status: DISCONTINUED | OUTPATIENT
Start: 2024-11-23 | End: 2024-12-03 | Stop reason: HOSPADM

## 2024-11-22 RX ORDER — POLYETHYLENE GLYCOL 3350 17 G/17G
17 POWDER, FOR SOLUTION ORAL DAILY
Status: DISCONTINUED | OUTPATIENT
Start: 2024-11-23 | End: 2024-11-29

## 2024-11-22 RX ORDER — LIDOCAINE 4 G/G
1 PATCH TOPICAL DAILY
Status: DISCONTINUED | OUTPATIENT
Start: 2024-11-23 | End: 2024-11-24

## 2024-11-22 RX ORDER — ATORVASTATIN CALCIUM 20 MG/1
20 TABLET, FILM COATED ORAL DAILY
Status: DISCONTINUED | OUTPATIENT
Start: 2024-11-23 | End: 2024-12-03 | Stop reason: HOSPADM

## 2024-11-22 RX ORDER — POLYETHYLENE GLYCOL 3350 17 G/17G
17 POWDER, FOR SOLUTION ORAL DAILY
Status: CANCELLED | OUTPATIENT
Start: 2024-11-23

## 2024-11-22 RX ORDER — ACETAMINOPHEN 325 MG/1
650 TABLET ORAL EVERY 6 HOURS PRN
Status: CANCELLED | OUTPATIENT
Start: 2024-11-22

## 2024-11-22 RX ORDER — ONDANSETRON 2 MG/ML
4 INJECTION INTRAMUSCULAR; INTRAVENOUS EVERY 6 HOURS PRN
Status: CANCELLED | OUTPATIENT
Start: 2024-11-22

## 2024-11-22 RX ORDER — MECOBALAMIN 5000 MCG
5 TABLET,DISINTEGRATING ORAL NIGHTLY
Status: CANCELLED | OUTPATIENT
Start: 2024-11-22

## 2024-11-22 RX ORDER — ONDANSETRON 4 MG/1
4 TABLET, ORALLY DISINTEGRATING ORAL EVERY 8 HOURS PRN
Status: CANCELLED | OUTPATIENT
Start: 2024-11-22

## 2024-11-22 RX ORDER — AMLODIPINE BESYLATE 10 MG/1
10 TABLET ORAL DAILY
Status: DISCONTINUED | OUTPATIENT
Start: 2024-11-23 | End: 2024-12-03 | Stop reason: HOSPADM

## 2024-11-22 RX ORDER — ACETAMINOPHEN 650 MG/1
650 SUPPOSITORY RECTAL EVERY 6 HOURS PRN
Status: DISCONTINUED | OUTPATIENT
Start: 2024-11-22 | End: 2024-11-25

## 2024-11-22 RX ORDER — LOSARTAN POTASSIUM 100 MG/1
100 TABLET ORAL DAILY
Status: CANCELLED | OUTPATIENT
Start: 2024-11-23

## 2024-11-22 RX ORDER — ASCORBIC ACID 500 MG
500 TABLET ORAL DAILY
Status: CANCELLED | OUTPATIENT
Start: 2024-11-23

## 2024-11-22 RX ORDER — DOCUSATE SODIUM 100 MG/1
100 CAPSULE, LIQUID FILLED ORAL 2 TIMES DAILY
Status: DISCONTINUED | OUTPATIENT
Start: 2024-11-22 | End: 2024-12-03 | Stop reason: HOSPADM

## 2024-11-22 RX ORDER — LABETALOL HYDROCHLORIDE 5 MG/ML
10 INJECTION, SOLUTION INTRAVENOUS EVERY 10 MIN PRN
Status: CANCELLED | OUTPATIENT
Start: 2024-11-22

## 2024-11-22 RX ORDER — ACETAMINOPHEN 650 MG/1
650 SUPPOSITORY RECTAL EVERY 6 HOURS PRN
Status: CANCELLED | OUTPATIENT
Start: 2024-11-22

## 2024-11-22 RX ORDER — DOCUSATE SODIUM 100 MG/1
100 CAPSULE, LIQUID FILLED ORAL 2 TIMES DAILY
Status: CANCELLED | OUTPATIENT
Start: 2024-11-22

## 2024-11-22 RX ORDER — SODIUM CHLORIDE 0.9 % (FLUSH) 0.9 %
5-40 SYRINGE (ML) INJECTION PRN
Status: DISCONTINUED | OUTPATIENT
Start: 2024-11-22 | End: 2024-12-03 | Stop reason: HOSPADM

## 2024-11-22 RX ORDER — ATORVASTATIN CALCIUM 20 MG/1
20 TABLET, FILM COATED ORAL DAILY
Status: CANCELLED | OUTPATIENT
Start: 2024-11-23

## 2024-11-22 RX ORDER — ONDANSETRON 2 MG/ML
4 INJECTION INTRAMUSCULAR; INTRAVENOUS EVERY 6 HOURS PRN
Status: DISCONTINUED | OUTPATIENT
Start: 2024-11-22 | End: 2024-11-27

## 2024-11-22 RX ORDER — MECOBALAMIN 5000 MCG
5 TABLET,DISINTEGRATING ORAL NIGHTLY
Status: DISCONTINUED | OUTPATIENT
Start: 2024-11-22 | End: 2024-12-03 | Stop reason: HOSPADM

## 2024-11-22 RX ORDER — LEVETIRACETAM 500 MG/1
1000 TABLET ORAL 2 TIMES DAILY
Status: CANCELLED | OUTPATIENT
Start: 2024-11-22

## 2024-11-22 RX ORDER — LEVETIRACETAM 500 MG/1
1000 TABLET ORAL 2 TIMES DAILY
Status: DISCONTINUED | OUTPATIENT
Start: 2024-11-22 | End: 2024-12-03 | Stop reason: HOSPADM

## 2024-11-22 RX ORDER — ASCORBIC ACID 500 MG
500 TABLET ORAL DAILY
Status: DISCONTINUED | OUTPATIENT
Start: 2024-11-23 | End: 2024-12-03 | Stop reason: HOSPADM

## 2024-11-22 RX ORDER — LABETALOL HYDROCHLORIDE 5 MG/ML
10 INJECTION, SOLUTION INTRAVENOUS EVERY 10 MIN PRN
Status: DISCONTINUED | OUTPATIENT
Start: 2024-11-22 | End: 2024-11-23

## 2024-11-22 RX ORDER — ACETAMINOPHEN 325 MG/1
650 TABLET ORAL EVERY 6 HOURS PRN
Status: DISCONTINUED | OUTPATIENT
Start: 2024-11-22 | End: 2024-11-25

## 2024-11-22 RX ORDER — SODIUM CHLORIDE 0.9 % (FLUSH) 0.9 %
5-40 SYRINGE (ML) INJECTION PRN
Status: CANCELLED | OUTPATIENT
Start: 2024-11-22

## 2024-11-22 RX ORDER — ONDANSETRON 4 MG/1
4 TABLET, ORALLY DISINTEGRATING ORAL EVERY 8 HOURS PRN
Status: DISCONTINUED | OUTPATIENT
Start: 2024-11-22 | End: 2024-12-03 | Stop reason: HOSPADM

## 2024-11-22 RX ORDER — AMLODIPINE BESYLATE 10 MG/1
10 TABLET ORAL DAILY
Status: CANCELLED | OUTPATIENT
Start: 2024-11-23

## 2024-11-22 RX ADMIN — LEVETIRACETAM 1000 MG: 500 TABLET, FILM COATED ORAL at 22:17

## 2024-11-22 RX ADMIN — Medication 5 MG: at 22:19

## 2024-11-22 RX ADMIN — ACETAMINOPHEN 325MG 650 MG: 325 TABLET ORAL at 22:18

## 2024-11-22 RX ADMIN — LEVETIRACETAM 1000 MG: 500 TABLET, FILM COATED ORAL at 10:34

## 2024-11-22 RX ADMIN — Medication 500 MG: at 10:34

## 2024-11-22 RX ADMIN — POLYETHYLENE GLYCOL 3350 17 G: 17 POWDER, FOR SOLUTION ORAL at 10:33

## 2024-11-22 RX ADMIN — LOSARTAN POTASSIUM 100 MG: 100 TABLET, FILM COATED ORAL at 10:34

## 2024-11-22 RX ADMIN — ACETAMINOPHEN 325MG 650 MG: 325 TABLET ORAL at 17:24

## 2024-11-22 RX ADMIN — DOCUSATE SODIUM 100 MG: 100 CAPSULE, LIQUID FILLED ORAL at 10:34

## 2024-11-22 RX ADMIN — ACETAMINOPHEN 325MG 650 MG: 325 TABLET ORAL at 03:29

## 2024-11-22 RX ADMIN — SODIUM CHLORIDE, PRESERVATIVE FREE 10 ML: 5 INJECTION INTRAVENOUS at 10:35

## 2024-11-22 RX ADMIN — AMLODIPINE BESYLATE 10 MG: 10 TABLET ORAL at 10:34

## 2024-11-22 RX ADMIN — ACETAMINOPHEN 325MG 650 MG: 325 TABLET ORAL at 10:34

## 2024-11-22 RX ADMIN — DOCUSATE SODIUM 100 MG: 100 CAPSULE, LIQUID FILLED ORAL at 22:19

## 2024-11-22 RX ADMIN — ATORVASTATIN CALCIUM 20 MG: 20 TABLET, FILM COATED ORAL at 10:34

## 2024-11-22 ASSESSMENT — PAIN DESCRIPTION - LOCATION: LOCATION: BACK

## 2024-11-22 ASSESSMENT — ENCOUNTER SYMPTOMS
NAUSEA: 0
COLOR CHANGE: 0
COUGH: 0
VOMITING: 0
TROUBLE SWALLOWING: 0
CHEST TIGHTNESS: 0
WHEEZING: 0
SHORTNESS OF BREATH: 0

## 2024-11-22 ASSESSMENT — PAIN SCALES - GENERAL
PAINLEVEL_OUTOF10: 3
PAINLEVEL_OUTOF10: 4
PAINLEVEL_OUTOF10: 3
PAINLEVEL_OUTOF10: 4
PAINLEVEL_OUTOF10: 10

## 2024-11-22 ASSESSMENT — PAIN DESCRIPTION - ORIENTATION
ORIENTATION: MID
ORIENTATION: RIGHT

## 2024-11-22 ASSESSMENT — PAIN DESCRIPTION - DESCRIPTORS
DESCRIPTORS: ACHING
DESCRIPTORS: ACHING

## 2024-11-22 ASSESSMENT — PAIN - FUNCTIONAL ASSESSMENT: PAIN_FUNCTIONAL_ASSESSMENT: ACTIVITIES ARE NOT PREVENTED

## 2024-11-22 NOTE — CARE COORDINATION
[facility] FOR PT TO COME TO REHAB. CAN ADMIT TO ROOM 261 WHEN MED.CLEARED

## 2024-11-22 NOTE — PROGRESS NOTES
Physical Therapy Med Surg Daily Treatment Note  Facility/Department: [address] ORTHO TELE  [facility]       NAME: Anahi Willett  : 1950 (74 y.o.)  MRN: [id number]  CODE STATUS: Full Code    Date of Service: 2024    Patient Diagnosis(es): Intracranial hemorrhage (HCC) [I62.9]  Right leg weakness [R29.898]  Hemorrhagic cerebrovascular accident (CVA) (HCC) [I61.9]   Chief Complaint   Patient presents with    Fall    Loss of Consciousness     Patient Active Problem List    Diagnosis Date Noted    Intracranial hemorrhage (HCC) 2024    Hemorrhagic cerebrovascular accident (CVA) (HCC) 2024    Precordial chest pain 10/15/2023        Past Medical History:   Diagnosis Date    Cervical cancer (HCC)     COPD (chronic obstructive pulmonary disease) (HCC)     Hypertension      Past Surgical History:   Procedure Laterality Date    APPENDECTOMY      CARPAL TUNNEL RELEASE Left     HYSTERECTOMY (CERVIX STATUS UNKNOWN)         Chart Reviewed: Yes    Restrictions:  Restrictions/Precautions: Fall Risk    SUBJECTIVE:   Subjective: I've been working this leg all morning.    Pain  Pain: R side and shoulders \"where I fell.\" 3/10 pre and post session Declined intervention     OBJECTIVE:   Orientation  Overall Orientation Status: Within Functional Limits    Bed mobility  Bed Mobility Comments: NT - Up in chair before and after tx.    Transfers  Sit to Stand: Minimal Assistance;Stand by assistance  Stand to Sit: Minimal Assistance;Stand by assistance  Bed to Chair: Stand by assistance;Minimal assistance  Comment: Quick, impulsive movements; vc's for hand placement and technique. Mildly unsteady upon standing. vc's for increased KHANH for improved balance.    Ambulation  Surface: Level tile  Device: Rolling Walker  Assistance: Minimal assistance;Moderate assistance;2 Person assistance  Quality of Gait: Assist with RLE advancement initially; vc's for weight shifting and R foot placement. Pt able to bring RLE  forward and bear weight. vc's to slow pace and constant cuing for sequencing.  Gait Deviations: Decreased step length;Decreased step height  Distance: 30'x2  Comments: +2 for safety                   PT Exercises  Exercise Treatment: LAQ/Marching - x10 with (AAROM on R to facilitate movement) Pt unable to movem RLE on her own.  Dynamic Standing Balance Exercises: weight shifting with good weight acceptance on R          Activity Tolerance  Activity Tolerance: Patient tolerated treatment well          ASSESSMENT   Assessment: Pt able to bear weight and advance RLE with cuing a facilitation. Constant cuing for sequencing. Up in chair post session.     Discharge Recommendations:  Continue to assess pending progress         Goals  Long Term Goals  Long Term Goal 1: Pt will demonstrate bed mobility SBA  Long Term Goal 2: Pt will demonstrate transfers min A with safest AD  Long Term Goal 3: Pt will demonstrate amb >/= 25ft mod A with safest AD  Long Term Goal 4: Pt will demonstrate static standing touching assistance with safest AD >/= 2 min    PLAN    General Plan: 1 time a day 7 days a week  Safety Devices  Type of Devices: Call light within reach, All fall risk precautions in place, Chair alarm in place, Left in chair     AMPAC (6 CLICK) BASIC MOBILITY  AM-PAC Inpatient Mobility Raw Score : 13      Therapy Time   Individual   Time In 0950   Time Out 1018   Minutes 28      Bm/Trsf - 8 mins mins  Gait - 15 mins   Therex 5 mins    Melanie Edwards PTA, 11/22/24 at 10:30 AM         Definitions for assistance levels  Independent = pt does not require any physical supervision or assistance from another person for activity completion. Device may be needed.  Stand by assistance = pt requires verbal cues or instructions from another person, close to but not touching, to perform the activity  Minimal assistance= pt performs 75% or more of the activity; assistance is required to complete the activity  Moderate assistance= pt

## 2024-11-22 NOTE — CARE COORDINATION
Precert Pending REF#[id number] [facility] portal.  Electronically signed by Wen Gomes RN on 11/21/24 at 7:01 PM EST

## 2024-11-22 NOTE — PROGRESS NOTES
Neurology Follow up    SUBJECTIVE: Patient seen and examined for neurology follow-up.  Alert and oriented x 3, no acute distress, cooperative.  Denies headache.  Afebrile.  No seizure activity overnight.  Right leg weakness noted which is somewhat improved.  Able to stand.    Patient actually is moving some in the right lower extremity.    Current Facility-Administered Medications   Medication Dose Route Frequency Provider Last Rate Last Admin    LORazepam (ATIVAN) injection 1 mg  1 mg IntraVENous Once Cecilia Gaviria, APRN - CNP        lidocaine 4 % external patch 1 patch  1 patch TransDERmal Daily Amando Mistry MD   1 patch at 11/22/24 1033    levETIRAcetam (KEPPRA) tablet 1,000 mg  1,000 mg Oral BID Amando Mistry MD   1,000 mg at 11/22/24 1034    docusate sodium (COLACE) capsule 100 mg  100 mg Oral BID Wili Conner MD   100 mg at 11/22/24 1034    amLODIPine (NORVASC) tablet 10 mg  10 mg Oral Daily Wili Conner MD   10 mg at 11/22/24 1034    polyethylene glycol (GLYCOLAX) packet 17 g  17 g Oral Daily Wili Conner MD   17 g at 11/22/24 1033    sodium chloride flush 0.9 % injection 5-40 mL  5-40 mL IntraVENous 2 times per day Maddie Becker R, DO   10 mL at 11/22/24 1035    sodium chloride flush 0.9 % injection 5-40 mL  5-40 mL IntraVENous PRN Maddie Becker R, DO        0.9 % sodium chloride infusion   IntraVENous PRN Maddie Becker R, DO        acetaminophen (TYLENOL) tablet 650 mg  650 mg Oral Q6H PRN Maddie Becker R, DO   650 mg at 11/22/24 1034    Or    acetaminophen (TYLENOL) suppository 650 mg  650 mg Rectal Q6H PRN Maddie Becker R, DO        ondansetron (ZOFRAN-ODT) disintegrating tablet 4 mg  4 mg Oral Q8H PRN Maddie Becker R, DO        Or    ondansetron (ZOFRAN) injection 4 mg  4 mg IntraVENous Q6H PRN Maddie Becker R, DO        labetalol (NORMODYNE;TRANDATE) injection 10 mg  10 mg IntraVENous Q10 Min PRN Maddie Becker R, DO   10 mg at 11/20/24 0635    atorvastatin (LIPITOR) tablet  abnormality. No acute fracture. In the field of view there is a 1.3 cm cystic nodule in left lobe of the thyroid.     1. Multilevel degenerative change of the thoracic spine. No acute fracture. 2. 1.3 cm cystic nodule in left lobe of the thyroid.  Follow-up     CT HEAD WO CONTRAST    Result Date: 11/18/2024  EXAMINATION: CT OF THE HEAD WITHOUT CONTRAST  11/18/2024 8:15 am TECHNIQUE: CT of the head was performed without the administration of intravenous contrast. Automated exposure control, iterative reconstruction, and/or weight based adjustment of the mA/kV was utilized to reduce the radiation dose to as low as reasonably achievable. COMPARISON: None HISTORY: ORDERING SYSTEM PROVIDED HISTORY: Stroke TECHNOLOGIST PROVIDED HISTORY: Has a \"code stroke\" or \"stroke alert\" been called?->Yes Reason for exam:->Stroke Decision Support Exception - unselect if not a suspected or confirmed emergency medical condition->Emergency Medical Condition (MA) What reading provider will be dictating this exam?->CRC FINDINGS: BRAIN/VENTRICLES: There is an ovoid 1.6 x 1.5 x 2.0 cm intracranial hemorrhage within the left frontal lobe at the vertex.  There is no mass effect or midline shift.  There is no ventriculomegaly.  There is periventricular hypoattenuation. ORBITS: Limited evaluation of the orbits is unremarkable. SINUSES: The paranasal sinuses and mastoid air cells are clear. SOFT TISSUES/SKULL:  No lytic or blastic osseous lesions are identified.     Acute 2 cm intracranial hemorrhage within the left frontal lobe.  This may be the result of cerebral amyloid disease.  MRI of the brain with and without contrast is recommended to exclude an underlying lesion. The above findings were discussed with Dr. Sofia at 8:50 a.m. on 11/18/2024.       Recent Labs     11/20/24  0350 11/21/24  0512   WBC 6.6 6.4   HGB 13.5 12.7    217     Recent Labs     11/20/24  0349 11/21/24  0512   * 137   K 3.6 4.0   CL 97 99   CO2 27 28   BUN

## 2024-11-22 NOTE — PLAN OF CARE
Problem: Discharge Planning  Goal: Discharge to home or other facility with appropriate resources  Outcome: Progressing     Problem: Pain  Goal: Verbalizes/displays adequate comfort level or baseline comfort level  Outcome: Progressing     Problem: Neurosensory - Adult  Goal: Achieves stable or improved neurological status  Outcome: Progressing  Goal: Absence of seizures  Outcome: Progressing  Goal: Achieves maximal functionality and self care  Outcome: Progressing     Problem: Respiratory - Adult  Goal: Achieves optimal ventilation and oxygenation  Outcome: Progressing     Problem: Cardiovascular - Adult  Goal: Maintains optimal cardiac output and hemodynamic stability  Outcome: Progressing  Goal: Absence of cardiac dysrhythmias or at baseline  Outcome: Progressing     Problem: Skin/Tissue Integrity - Adult  Goal: Skin integrity remains intact  Outcome: Progressing     Problem: Musculoskeletal - Adult  Goal: Return mobility to safest level of function  Outcome: Progressing  Flowsheets (Taken 11/21/2024 1914)  Return Mobility to Safest Level of Function: Assess patient stability and activity tolerance for standing, transferring and ambulating with or without assistive devices     Problem: Gastrointestinal - Adult  Goal: Minimal or absence of nausea and vomiting  Outcome: Progressing  Flowsheets (Taken 11/21/2024 1914)  Minimal or absence of nausea and vomiting: Administer IV fluids as ordered to ensure adequate hydration  Goal: Maintains or returns to baseline bowel function  Outcome: Progressing  Flowsheets (Taken 11/21/2024 1914)  Maintains or returns to baseline bowel function: Assess bowel function  Goal: Maintains adequate nutritional intake  Outcome: Progressing  Flowsheets (Taken 11/21/2024 1914)  Maintains adequate nutritional intake: Monitor intake and output, weight and lab values     Problem: Genitourinary - Adult  Goal: Absence of urinary retention  Outcome: Progressing     Problem: Infection -  Adult  Goal: Absence of infection at discharge  Outcome: Progressing  Flowsheets (Taken 11/21/2024 1914)  Absence of infection at discharge: Assess and monitor for signs and symptoms of infection     Problem: Metabolic/Fluid and Electrolytes - Adult  Goal: Electrolytes maintained within normal limits  Outcome: Progressing  Goal: Hemodynamic stability and optimal renal function maintained  Outcome: Progressing  Goal: Glucose maintained within prescribed range  Outcome: Progressing     Problem: Hematologic - Adult  Goal: Maintains hematologic stability  Outcome: Progressing     Problem: Self Care Deficit  Goal: Return ADL status to a safe level of function  Description: INTERVENTIONS:  1. Administer medication as ordered  2. Assess ADL deficits and provide assistive devices as needed  3. Obtain PT/OT consults as needed  4. Assist and instruct patient to increase activity and self care as tolerated  Outcome: Progressing     Problem: Chronic Conditions and Co-morbidities  Goal: Patient's chronic conditions and co-morbidity symptoms are monitored and maintained or improved  Outcome: Progressing     Problem: Safety - Adult  Goal: Free from fall injury  Outcome: Progressing     Problem: Skin/Tissue Integrity  Goal: Absence of new skin breakdown  Description: 1.  Monitor for areas of redness and/or skin breakdown  2.  Assess vascular access sites hourly  3.  Every 4-6 hours minimum:  Change oxygen saturation probe site  4.  Every 4-6 hours:  If on nasal continuous positive airway pressure, respiratory therapy assess nares and determine need for appliance change or resting period.  Outcome: Progressing

## 2024-11-22 NOTE — DISCHARGE INSTR - COC
Continuity of Care Form    Patient Name: Anahi Willett   :  1950  MRN:  [id number]    Admit date:  2024  Discharge date:  24    Code Status Order: Full Code   Advance Directives:   Advance Care Flowsheet Documentation             Admitting Physician:  Maddie Becker DO  PCP: Fahad Mayorga DO    Discharging Nurse: devny  Discharging Hospital Unit/Room#: W277/W277-01  Discharging Unit Phone Number: [phone]    Emergency Contact:   Extended Emergency Contact Information  Primary Emergency Contact: Randa KAHN   [facility]  Home Phone: [phone]  Relation: None  Secondary Emergency Contact: Mercy Ward  Mobile Phone: [phone]  Relation: Child  Preferred language: English   needed? No    Past Surgical History:  Past Surgical History:   Procedure Laterality Date    APPENDECTOMY      CARPAL TUNNEL RELEASE Left     HYSTERECTOMY (CERVIX STATUS UNKNOWN)         Immunization History:   Immunization History   Administered Date(s) Administered    COVID-19, MODERNA BLUE border, Primary or Immunocompromised, (age 12y+), IM, 100 mcg/0.5mL 2021, 2021    COVID-19, MODERNA Bivalent, (age 12y+), IM, 50 mcg/0.5 mL 01/10/2023    COVID-19, MODERNA, (age 12y+), IM, 50mcg/0.5mL 10/24/2023       Active Problems:  Patient Active Problem List   Diagnosis Code    Precordial chest pain R07.2    Intracranial hemorrhage (HCC) I62.9    Hemorrhagic cerebrovascular accident (CVA) (HCC) I61.9       Isolation/Infection:   Isolation            No Isolation          Patient Infection Status       None to display            Nurse Assessment:  Last Vital Signs: BP (!) 145/74   Pulse 82   Temp 97.7 °F (36.5 °C) (Oral)   Resp 18   Ht 1.702 m (5' 7\")   Wt 52.6 kg (116 lb)   SpO2 96%   BMI 18.17 kg/m²     Last documented pain score (0-10 scale): Pain Level: 3  Last Weight:   Wt Readings from Last 1 Encounters:   24 52.6 kg (116 lb)     Mental Status:  oriented, alert,

## 2024-11-22 NOTE — CARE COORDINATION
Checked Medical [place] portal and pt has approval to admit 11/22/24. Ref #[id number]. CM aware that pt can admit to room 261 pending medical clearance. Electronically signed by Indu Hector RN on 11/22/2024 at 3:25 PM

## 2024-11-22 NOTE — PROGRESS NOTES
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  Plan for rehab once approved.  Noted MRI of the brain.  Bleed is improving.  No other changes.  Noted MRV and MRI also, no issue.  Neurologist is following  Followed by neurosurgery  Neurochecks  Discussed plan of care with patient's RN   Follow MRI brain   DVT prophylaxis-SCDs, no anticoagulation until okay by neurosurgery/neurology    7 pm- 7 am, please contact on call Hospitalist for any needs     Subjective:      no new events.     Physical Examination:      Vitals:  BP (!) 145/74   Pulse 82   Temp 97.7 °F (36.5 °C) (Oral)   Resp 18   Ht 1.702 m (5' 7\")   Wt 52.6 kg (116 lb)   SpO2 96%   BMI 18.17 kg/m²   Temp (24hrs), Av.7 °F (36.5 °C), Min:97.2 °F (36.2 °C), Max:98 °F (36.7 °C)      CONSTITUTIONAL:  awake, alert, cooperative, no apparent distress, and appears stated age  HEENT: Normocephalic, PERRLA  NECK: no JVD, no LAD  HEART: RRR, no murmurs, gallops, or rubs  LUNGS: clear to auscultation bilaterally, no wheezes, crackles, or rhonchi.  ABDOMEN: soft/NT/ND, positive BS  MUSCULOSKELETAL: negative for edema, +2 pulses  SKIN: intact without rash or jaundice  NEURO: Alert and oriented x 3, strength 5/5 bilateral upper extremities, strength 5 out of 5 left lower extremity, strength 0/5 right lower extremity.  Sensation intact and symmetric throughout.  Speech normal.    Data:     Labs:  Recent Labs     24  0350 24  0512   WBC 6.6 6.4   HGB 13.5 12.7    217     Recent Labs     24  0349 24  0512    * 137   K 3.6 4.0   CL 97 99   CO2 27 28   BUN 10 15   CREATININE 0.37* 0.48*   GLUCOSE 97 98     No results for input(s): \"AST\", \"ALT\", \"BILITOT\", \"ALKPHOS\" in the last 72 hours.    Invalid input(s): \"ALB\"      Current Facility-Administered Medications   Medication Dose Route Frequency Provider Last Rate Last Admin    LORazepam (ATIVAN) injection 1 mg  1 mg IntraVENous Once Cecilia Gaviria, APRN - CNP        lidocaine 4 % external patch 1 patch  1 patch TransDERmal Daily Amando Mistry MD   1 patch at 11/22/24 1033    levETIRAcetam (KEPPRA) tablet 1,000 mg  1,000 mg Oral BID Amando Mistry MD   1,000 mg at 11/22/24 1034    docusate sodium (COLACE) capsule 100 mg  100 mg Oral BID Wili Conner MD   100 mg at 11/22/24 1034    amLODIPine (NORVASC) tablet 10 mg  10 mg Oral Daily Wili Conner MD   10 mg at 11/22/24 1034    polyethylene glycol (GLYCOLAX) packet 17 g  17 g Oral Daily Wili Conner MD   17 g at 11/22/24 1033    sodium chloride flush 0.9 % injection 5-40 mL  5-40 mL IntraVENous 2 times per day EugenioKrystazid R, DO   10 mL at 11/22/24 1035    sodium chloride flush 0.9 % injection 5-40 mL  5-40 mL IntraVENous PRN Eugenio, Yazid R, DO        0.9 % sodium chloride infusion   IntraVENous PRN Eugenio, Yazid R, DO        acetaminophen (TYLENOL) tablet 650 mg  650 mg Oral Q6H PRN Eugenio, Yazid R, DO   650 mg at 11/22/24 1034    Or    acetaminophen (TYLENOL) suppository 650 mg  650 mg Rectal Q6H PRN Eugenio, Yazid R, DO        ondansetron (ZOFRAN-ODT) disintegrating tablet 4 mg  4 mg Oral Q8H PRN Eugenio, Yazid R, DO        Or    ondansetron (ZOFRAN) injection 4 mg  4 mg IntraVENous Q6H PRN Eugenio, Yazid R, DO        labetalol (NORMODYNE;TRANDATE) injection 10 mg  10 mg IntraVENous Q10 Min PRN Maddie Becker DO   10 mg at 11/20/24 0635    atorvastatin (LIPITOR) tablet 20 mg  20 mg Oral Daily Maddie Becker, DO   20 mg at 11/22/24 1034    losartan (COZAAR) tablet 100 mg  100 mg Oral  Daily Maddie Becker DO   100 mg at 11/22/24 1034    melatonin disintegrating tablet 5 mg  5 mg Oral Nightly EugenioMaddie DO   5 mg at 11/21/24 2124    ascorbic acid (VITAMIN C) tablet 500 mg  500 mg Oral Daily Maddie Becker DO   500 mg at 11/22/24 1034       New information updated in the note today, rest of the examination did not change compared to yesterday.    Additional work up or/and treatment plan may be added today or then after based on clinical progression. I am managing a portion of pt care. Some medical issues are handled by other specialists. Additional work up and treatment should be done in out pt setting by pt PCP and other out pt providers.      In addition to examining and evaluating pt, I spent additional time explaining care, normaland abnormal findings, and treatment plan. All of pt questions were answered. Counseling, diet and education were provided. Case will be discussed with nursing staff when appropriate. Family will be updated if and when appropriate.       Electronically signed by Maddie Becker DO on 11/22/2024 at 1:16 PM

## 2024-11-23 PROBLEM — Z74.09 IMPAIRED MOBILITY AND ACTIVITIES OF DAILY LIVING: Status: ACTIVE | Noted: 2024-11-23

## 2024-11-23 PROBLEM — Z78.9 IMPAIRED MOBILITY AND ACTIVITIES OF DAILY LIVING: Status: ACTIVE | Noted: 2024-11-23

## 2024-11-23 PROCEDURE — 6370000000 HC RX 637 (ALT 250 FOR IP): Performed by: PHYSICAL MEDICINE & REHABILITATION

## 2024-11-23 PROCEDURE — 97166 OT EVAL MOD COMPLEX 45 MIN: CPT

## 2024-11-23 PROCEDURE — 97116 GAIT TRAINING THERAPY: CPT

## 2024-11-23 PROCEDURE — 97162 PT EVAL MOD COMPLEX 30 MIN: CPT

## 2024-11-23 PROCEDURE — 1180000000 HC REHAB R&B

## 2024-11-23 PROCEDURE — 97110 THERAPEUTIC EXERCISES: CPT

## 2024-11-23 PROCEDURE — 97530 THERAPEUTIC ACTIVITIES: CPT

## 2024-11-23 PROCEDURE — 92610 EVALUATE SWALLOWING FUNCTION: CPT

## 2024-11-23 PROCEDURE — 6370000000 HC RX 637 (ALT 250 FOR IP): Performed by: INTERNAL MEDICINE

## 2024-11-23 PROCEDURE — 92523 SPEECH SOUND LANG COMPREHEN: CPT

## 2024-11-23 RX ORDER — ACETAMINOPHEN 325 MG/1
650 TABLET ORAL EVERY 4 HOURS PRN
Status: DISCONTINUED | OUTPATIENT
Start: 2024-11-23 | End: 2024-12-03 | Stop reason: HOSPADM

## 2024-11-23 RX ORDER — BISACODYL 10 MG
10 SUPPOSITORY, RECTAL RECTAL DAILY PRN
Status: DISCONTINUED | OUTPATIENT
Start: 2024-11-23 | End: 2024-12-03 | Stop reason: HOSPADM

## 2024-11-23 RX ORDER — SODIUM PHOSPHATE, DIBASIC AND SODIUM PHOSPHATE, MONOBASIC 7; 19 G/230ML; G/230ML
1 ENEMA RECTAL DAILY PRN
Status: DISCONTINUED | OUTPATIENT
Start: 2024-11-23 | End: 2024-12-03 | Stop reason: HOSPADM

## 2024-11-23 RX ADMIN — LEVETIRACETAM 1000 MG: 500 TABLET, FILM COATED ORAL at 10:47

## 2024-11-23 RX ADMIN — Medication 5 MG: at 20:52

## 2024-11-23 RX ADMIN — DOCUSATE SODIUM 100 MG: 100 CAPSULE, LIQUID FILLED ORAL at 20:52

## 2024-11-23 RX ADMIN — AMLODIPINE BESYLATE 10 MG: 10 TABLET ORAL at 10:44

## 2024-11-23 RX ADMIN — POLYETHYLENE GLYCOL 3350 17 G: 17 POWDER, FOR SOLUTION ORAL at 10:36

## 2024-11-23 RX ADMIN — ACETAMINOPHEN 325MG 650 MG: 325 TABLET ORAL at 10:52

## 2024-11-23 RX ADMIN — LEVETIRACETAM 1000 MG: 500 TABLET, FILM COATED ORAL at 20:52

## 2024-11-23 RX ADMIN — ACETAMINOPHEN 325MG 650 MG: 325 TABLET ORAL at 16:56

## 2024-11-23 ASSESSMENT — PAIN DESCRIPTION - ORIENTATION
ORIENTATION: MID
ORIENTATION: MID

## 2024-11-23 ASSESSMENT — PAIN SCALES - GENERAL
PAINLEVEL_OUTOF10: 4
PAINLEVEL_OUTOF10: 3

## 2024-11-23 ASSESSMENT — PAIN DESCRIPTION - DESCRIPTORS
DESCRIPTORS: ACHING
DESCRIPTORS: ACHING

## 2024-11-23 ASSESSMENT — PAIN DESCRIPTION - LOCATION
LOCATION: NECK
LOCATION: HEAD

## 2024-11-23 NOTE — H&P
were created at an off-line workstation by the interpreting physician or with concurrent physician supervision, with images created, reviewed and archived. Contrast: IV:  10 mL ml of Dotarem RESULT: Pancreas: Pancreas is normal in precontrast T1 signal intensity with no solid masses or main pancreatic duct dilatation. Biliary: 0.9 cm stone in the distal common bile duct at the ampulla (3:32) resulting in intrahepatic and extrahepatic biliary ductal dilation.  Common duct is dilated up to 1.3 cm.  Low insertion of the cystic duct Gallbladder: Cholelithiasis without gallbladder wall thickening. Liver: No mass.  No MR findings of hepatic steatosis. No thrombus in the portal venous system (splenic vein, main portal vein, left and right anterior and right posterior portal vein branches). Spleen: No focal splenic lesions.  Spleen is normal in size. Kidneys: Kidneys enhance symmetrically without hydroureteronephrosis or enhancing renal masses. Adrenal glands: Adrenal glands are normal. Lymph nodes: No lymphadenopathy by size criteria.   Mesentery: No ascites. Osseous structures: No aggressive osseous lesions.    IMPRESSION: 0.9 cm stone in the distal common bile duct at the ampulla resulting in intrahepatic and extrahepatic biliary ductal dilation.  ERCP recommended for further assessment. Cholelithiasis. ACTIONABLE RESULT: FOLLOW-UP Acuity: Actionable Findings: Pancreas/Biliary Routing Code:  PB_1 Recommendation: Unlisted Recommendation (see report) Time Frame: At the discretion of the clinical team. COMMUNICATION: Results will be communicated with the ordering provider via Epic staff message or phone message by [facility] Support Services within 2 business days of report finalization. --END OF FINDING-- : RAUL   Transcribe Date/Time: Nov 8 2024 11:16A Dictated by : ABDIRAHMAN HARMON MD This examination was interpreted and the report reviewed and electronically signed by: ABDIRAHMAN HARMON MD on Nov 8 2024

## 2024-11-23 NOTE — CONSULTS
Hospital Medicine  History and Physical    Patient:  Anahi Willett  MRN: [id number]    CHIEF COMPLAINT:  No chief complaint on file.      History Obtained From:  Patient, EMR  Primary Care Physician: Fahad Mayorga DO    HISTORY OF PRESENT ILLNESS:   The patient is a 74 y.o. female with PMH of hypertension.  She was admitted to rehab unit for inpatient physical therapy after she was initially admitted to acute care after a fall.  She was found to have left frontal bleed.  Facial bruising and ecchymosis, nasal fracture.  Multiple other abnormalities seen on imaging.  Patient is doing well.  No chest pain or palpitation.  No abdominal pain, nausea, vomiting, diarrhea, dysuria hematuria    Past Medical History:      Diagnosis Date    Cervical cancer (HCC)     COPD (chronic obstructive pulmonary disease) (HCC)     Hypertension        Past Surgical History:      Procedure Laterality Date    APPENDECTOMY      CARPAL TUNNEL RELEASE Left     HYSTERECTOMY (CERVIX STATUS UNKNOWN)         Medications Prior to Admission:    Prior to Admission medications    Medication Sig Start Date End Date Taking? Authorizing Provider   atorvastatin (LIPITOR) 20 MG tablet Take 1 tablet by mouth daily   Yes Talya Gonzalez MD   vitamin C (ASCORBIC ACID) 500 MG tablet Take 1 tablet by mouth daily   Yes Talya Gonzalez MD   Calcium Ascorbate 500 MG TABS Take 1 tablet by mouth daily   Yes Talya Gonzalez MD   losartan (COZAAR) 25 MG tablet Take 4 tablets by mouth daily 10/24/18  Yes Talya Gonzalez MD   melatonin 5 MG TABS tablet Take 1 tablet by mouth nightly   Yes Talya Gonzalez MD   aspirin 81 MG EC tablet Take 1 tablet by mouth daily  Patient not taking: Reported on 11/18/2024 10/17/23   Judy Recinos PA   polycarbophil (FIBERCON) 625 MG tablet Take 625 mg by mouth  Patient not taking: Reported on 10/15/2023 9/25/18   Talya Gonzalez MD       Allergies:  Patient has no known

## 2024-11-23 NOTE — FLOWSHEET NOTE
Pt was admitted to room 238.  Pt is alert and oriented x4.  Pt without S/S of distress. Brusing of Right side of face due to a fall out of bed. Pt with clear speech, but stutters at times.  Pt is being transferred by wheelchair. Pt with pain that is rated 4/10 of Right Shoulder described as aching.  Pt attributes this pain from recent fall at home.  Right sided weakness of upper and lower extremity noted.  She is able to move RUE purposefully, but is not able to move RLE.  Pt states she has sensation of all extremities.  Please see assessment for further information.  Pt has been updated on [facility] policy and procedures. Pt's signatures obtained.

## 2024-11-23 NOTE — PLAN OF CARE
Problem: Pain  Goal: Verbalizes/displays adequate comfort level or baseline comfort level  Outcome: Progressing     Problem: ABCDS Injury Assessment  Goal: Absence of physical injury  Outcome: Progressing     Problem: Safety - Adult  Goal: Free from fall injury  Outcome: Progressing  Flowsheets (Taken 11/23/2024 0029)  Free From Fall Injury: Instruct family/caregiver on patient safety

## 2024-11-23 NOTE — DISCHARGE SUMMARY
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  She was found to have ICH within the left frontal lobe, this close right-sided hemiparesis mainly in the right lower extremity.  She was monitored initially in the ICU.  Repeat CT head remained stable.  She also underwent MRI of the brain showing acute infarction.  She had negative MRV and MRA of the brain.  She was evaluated by neurosurgery also.  She was discharged to rehab in a stable condition.  She did show some improvement in the right lower extremity strength but minimal.    Exam on discharge:   /61   Pulse 82   Temp 97.7 °F (36.5 °C) (Oral)   Resp 18   Ht 1.702 m (5' 7\")   Wt 52.6 kg (116 lb)   SpO2 96%   BMI 18.17 kg/m²     See progress note.    Patient was seen by the following consultants   Consults:  IP CONSULT TO NEUROLOGY  IP CONSULT TO SPIRITUAL SERVICES  IP CONSULT TO NEUROSURGERY  IP CONSULT TO STROKE TEAM  IP CONSULT TO NEUROLOGY  IP CONSULT TO CRITICAL CARE  IP CONSULT TO REHAB/TCU ADMISSION COORDINATOR    Significant Diagnostic Studies:    Refer to chart     Please refer to chart if no studies are shown here    XR PELVIS (1-2 VIEWS)    Result Date: 2024  EXAMINATION: ONE XRAY VIEW OF THE PELVIS 2024 10:46 am COMPARISON: None. HISTORY: ORDERING SYSTEM PROVIDED HISTORY: right LE standing TECHNOLOGIST PROVIDED HISTORY: Reason for exam:->right LE standing What reading

## 2024-11-24 PROCEDURE — 97535 SELF CARE MNGMENT TRAINING: CPT

## 2024-11-24 PROCEDURE — 6370000000 HC RX 637 (ALT 250 FOR IP): Performed by: PHYSICAL MEDICINE & REHABILITATION

## 2024-11-24 PROCEDURE — 97116 GAIT TRAINING THERAPY: CPT

## 2024-11-24 PROCEDURE — 97530 THERAPEUTIC ACTIVITIES: CPT

## 2024-11-24 PROCEDURE — 6370000000 HC RX 637 (ALT 250 FOR IP): Performed by: INTERNAL MEDICINE

## 2024-11-24 PROCEDURE — 1180000000 HC REHAB R&B

## 2024-11-24 PROCEDURE — 97112 NEUROMUSCULAR REEDUCATION: CPT

## 2024-11-24 PROCEDURE — 97110 THERAPEUTIC EXERCISES: CPT

## 2024-11-24 RX ORDER — LIDOCAINE 4 G/G
3 PATCH TOPICAL DAILY
Status: DISCONTINUED | OUTPATIENT
Start: 2024-11-25 | End: 2024-11-25

## 2024-11-24 RX ORDER — LIDOCAINE 4 G/G
2 PATCH TOPICAL ONCE
Status: COMPLETED | OUTPATIENT
Start: 2024-11-24 | End: 2024-11-25

## 2024-11-24 RX ADMIN — POLYETHYLENE GLYCOL 3350 17 G: 17 POWDER, FOR SOLUTION ORAL at 08:20

## 2024-11-24 RX ADMIN — AMLODIPINE BESYLATE 10 MG: 10 TABLET ORAL at 08:19

## 2024-11-24 RX ADMIN — ACETAMINOPHEN 325MG 650 MG: 325 TABLET ORAL at 12:56

## 2024-11-24 RX ADMIN — DOCUSATE SODIUM 100 MG: 100 CAPSULE, LIQUID FILLED ORAL at 20:06

## 2024-11-24 RX ADMIN — ACETAMINOPHEN 325MG 650 MG: 325 TABLET ORAL at 00:17

## 2024-11-24 RX ADMIN — Medication 5 MG: at 20:08

## 2024-11-24 RX ADMIN — ATORVASTATIN CALCIUM 20 MG: 20 TABLET, FILM COATED ORAL at 08:18

## 2024-11-24 RX ADMIN — LEVETIRACETAM 1000 MG: 500 TABLET, FILM COATED ORAL at 20:06

## 2024-11-24 RX ADMIN — ACETAMINOPHEN 325MG 650 MG: 325 TABLET ORAL at 18:38

## 2024-11-24 RX ADMIN — LOSARTAN POTASSIUM 100 MG: 100 TABLET, FILM COATED ORAL at 08:19

## 2024-11-24 RX ADMIN — ACETAMINOPHEN 325MG 650 MG: 325 TABLET ORAL at 08:22

## 2024-11-24 RX ADMIN — Medication 500 MG: at 08:19

## 2024-11-24 RX ADMIN — DOCUSATE SODIUM 100 MG: 100 CAPSULE, LIQUID FILLED ORAL at 08:19

## 2024-11-24 RX ADMIN — LEVETIRACETAM 1000 MG: 500 TABLET, FILM COATED ORAL at 08:18

## 2024-11-24 ASSESSMENT — PAIN DESCRIPTION - ONSET: ONSET: ON-GOING

## 2024-11-24 ASSESSMENT — PAIN SCALES - GENERAL
PAINLEVEL_OUTOF10: 7
PAINLEVEL_OUTOF10: 5
PAINLEVEL_OUTOF10: 4
PAINLEVEL_OUTOF10: 0
PAINLEVEL_OUTOF10: 4

## 2024-11-24 ASSESSMENT — PAIN DESCRIPTION - FREQUENCY: FREQUENCY: CONTINUOUS

## 2024-11-24 ASSESSMENT — PAIN - FUNCTIONAL ASSESSMENT: PAIN_FUNCTIONAL_ASSESSMENT: ACTIVITIES ARE NOT PREVENTED

## 2024-11-24 ASSESSMENT — PAIN DESCRIPTION - ORIENTATION
ORIENTATION: RIGHT
ORIENTATION: RIGHT

## 2024-11-24 ASSESSMENT — PAIN DESCRIPTION - DESCRIPTORS
DESCRIPTORS: ACHING

## 2024-11-24 ASSESSMENT — PAIN DESCRIPTION - LOCATION
LOCATION: BACK
LOCATION: GENERALIZED
LOCATION: ARM;SHOULDER

## 2024-11-24 ASSESSMENT — PAIN DESCRIPTION - PAIN TYPE: TYPE: ACUTE PAIN

## 2024-11-24 NOTE — PROGRESS NOTES
Physical Therapy  Facility/Department: [facility] MED SURG W277/W277-01  Physical Therapy Discharge      NAME: Anahi Willett    : 1950 (74 y.o.)  MRN: [id number]    Account: [id number]  Gender: female      Patient has been discharged from [facility] hospital. DC patient from current PT program.      Electronically signed by Miesha Luther PT on 24 at 10:24 AM EST

## 2024-11-25 PROBLEM — I25.10 CORONARY ARTERY CALCIFICATION SEEN ON CAT SCAN: Status: ACTIVE | Noted: 2022-11-17

## 2024-11-25 PROBLEM — E04.1 THYROID NODULE: Status: ACTIVE | Noted: 2022-11-17

## 2024-11-25 PROBLEM — J43.2 CENTRILOBULAR EMPHYSEMA (HCC): Status: ACTIVE | Noted: 2022-11-17

## 2024-11-25 PROBLEM — M25.512 CHRONIC LEFT SHOULDER PAIN: Status: ACTIVE | Noted: 2020-04-28

## 2024-11-25 PROBLEM — I70.0 CALCIFICATION OF ABDOMINAL AORTA (HCC): Status: ACTIVE | Noted: 2024-09-19

## 2024-11-25 PROBLEM — R91.8 LUNG NODULES: Status: ACTIVE | Noted: 2022-11-17

## 2024-11-25 PROBLEM — G89.29 CHRONIC LEFT SHOULDER PAIN: Status: ACTIVE | Noted: 2020-04-28

## 2024-11-25 PROCEDURE — 97535 SELF CARE MNGMENT TRAINING: CPT

## 2024-11-25 PROCEDURE — 99233 SBSQ HOSP IP/OBS HIGH 50: CPT | Performed by: PHYSICAL MEDICINE & REHABILITATION

## 2024-11-25 PROCEDURE — 97530 THERAPEUTIC ACTIVITIES: CPT

## 2024-11-25 PROCEDURE — 6360000002 HC RX W HCPCS: Performed by: PHYSICAL MEDICINE & REHABILITATION

## 2024-11-25 PROCEDURE — 99222 1ST HOSP IP/OBS MODERATE 55: CPT | Performed by: PHYSICIAN ASSISTANT

## 2024-11-25 PROCEDURE — 1180000000 HC REHAB R&B

## 2024-11-25 PROCEDURE — 97110 THERAPEUTIC EXERCISES: CPT

## 2024-11-25 PROCEDURE — 6370000000 HC RX 637 (ALT 250 FOR IP): Performed by: INTERNAL MEDICINE

## 2024-11-25 PROCEDURE — 6370000000 HC RX 637 (ALT 250 FOR IP): Performed by: PHYSICAL MEDICINE & REHABILITATION

## 2024-11-25 PROCEDURE — 97116 GAIT TRAINING THERAPY: CPT

## 2024-11-25 PROCEDURE — 97112 NEUROMUSCULAR REEDUCATION: CPT

## 2024-11-25 RX ORDER — UBIDECARENONE 100 MG
100 CAPSULE ORAL DAILY
Status: DISCONTINUED | OUTPATIENT
Start: 2024-11-25 | End: 2024-12-03 | Stop reason: HOSPADM

## 2024-11-25 RX ORDER — VITAMIN B COMPLEX
2000 TABLET ORAL
Status: DISCONTINUED | OUTPATIENT
Start: 2024-11-25 | End: 2024-12-03 | Stop reason: HOSPADM

## 2024-11-25 RX ORDER — LIDOCAINE 4 G/G
3 PATCH TOPICAL DAILY
Status: DISCONTINUED | OUTPATIENT
Start: 2024-11-25 | End: 2024-12-03 | Stop reason: HOSPADM

## 2024-11-25 RX ORDER — CYANOCOBALAMIN 1000 UG/ML
1000 INJECTION, SOLUTION INTRAMUSCULAR; SUBCUTANEOUS WEEKLY
Status: DISCONTINUED | OUTPATIENT
Start: 2024-11-25 | End: 2024-12-03 | Stop reason: HOSPADM

## 2024-11-25 RX ADMIN — ATORVASTATIN CALCIUM 20 MG: 20 TABLET, FILM COATED ORAL at 08:48

## 2024-11-25 RX ADMIN — AMLODIPINE BESYLATE 10 MG: 10 TABLET ORAL at 08:48

## 2024-11-25 RX ADMIN — Medication 500 MG: at 08:48

## 2024-11-25 RX ADMIN — POLYETHYLENE GLYCOL 3350 17 G: 17 POWDER, FOR SOLUTION ORAL at 08:49

## 2024-11-25 RX ADMIN — ACETAMINOPHEN 325MG 650 MG: 325 TABLET ORAL at 18:38

## 2024-11-25 RX ADMIN — ACETAMINOPHEN 325MG 650 MG: 325 TABLET ORAL at 13:03

## 2024-11-25 RX ADMIN — Medication 100 MG: at 08:53

## 2024-11-25 RX ADMIN — DOCUSATE SODIUM 100 MG: 100 CAPSULE, LIQUID FILLED ORAL at 08:48

## 2024-11-25 RX ADMIN — LOSARTAN POTASSIUM 100 MG: 100 TABLET, FILM COATED ORAL at 08:48

## 2024-11-25 RX ADMIN — CYANOCOBALAMIN 1000 MCG: 1000 INJECTION, SOLUTION INTRAMUSCULAR; SUBCUTANEOUS at 08:53

## 2024-11-25 RX ADMIN — Medication 2000 UNITS: at 17:04

## 2024-11-25 RX ADMIN — LEVETIRACETAM 1000 MG: 500 TABLET, FILM COATED ORAL at 20:27

## 2024-11-25 RX ADMIN — DOCUSATE SODIUM 100 MG: 100 CAPSULE, LIQUID FILLED ORAL at 20:27

## 2024-11-25 RX ADMIN — LEVETIRACETAM 1000 MG: 500 TABLET, FILM COATED ORAL at 08:48

## 2024-11-25 RX ADMIN — ACETAMINOPHEN 325MG 650 MG: 325 TABLET ORAL at 08:48

## 2024-11-25 RX ADMIN — Medication 5 MG: at 20:27

## 2024-11-25 ASSESSMENT — PAIN SCALES - GENERAL
PAINLEVEL_OUTOF10: 0
PAINLEVEL_OUTOF10: 5
PAINLEVEL_OUTOF10: 4
PAINLEVEL_OUTOF10: 0
PAINLEVEL_OUTOF10: 5

## 2024-11-25 ASSESSMENT — PAIN DESCRIPTION - ORIENTATION: ORIENTATION: RIGHT

## 2024-11-25 ASSESSMENT — PAIN DESCRIPTION - LOCATION: LOCATION: SHOULDER

## 2024-11-25 ASSESSMENT — PAIN DESCRIPTION - PAIN TYPE: TYPE: ACUTE PAIN

## 2024-11-25 NOTE — PLAN OF CARE
Problem: Discharge Planning  Goal: Discharge to home or other facility with appropriate resources  11/24/2024 2203 by Vance Tompkins RN  Outcome: Progressing  11/24/2024 1039 by Estefany Nuñez RN  Outcome: Progressing     Problem: Pain  Goal: Verbalizes/displays adequate comfort level or baseline comfort level  11/24/2024 2203 by Vance Tompkins RN  Outcome: Progressing  11/24/2024 1039 by Estefany Nuñez RN  Outcome: Progressing     Problem: ABCDS Injury Assessment  Goal: Absence of physical injury  11/24/2024 2203 by Vance Tompkins RN  Outcome: Progressing  11/24/2024 1039 by Estefany Nuñez RN  Outcome: Progressing     Problem: Safety - Adult  Goal: Free from fall injury  11/24/2024 2203 by Vance Tompkins RN  Outcome: Progressing  11/24/2024 1039 by Estefany Nuñez RN  Outcome: Progressing

## 2024-11-25 NOTE — PLAN OF CARE
Problem: Discharge Planning  Goal: Discharge to home or other facility with appropriate resources  11/25/2024 1057 by Anu Parsons RN  Outcome: Progressing  11/24/2024 2203 by Vance Tompkins RN  Outcome: Progressing     Problem: Pain  Goal: Verbalizes/displays adequate comfort level or baseline comfort level  11/25/2024 1057 by Anu Parsons RN  Outcome: Progressing  11/24/2024 2203 by Vance Tompkins RN  Outcome: Progressing     Problem: ABCDS Injury Assessment  Goal: Absence of physical injury  11/25/2024 1057 by Anu Parsons RN  Outcome: Progressing  11/24/2024 2203 by Vance Tompkins RN  Outcome: Progressing     Problem: Safety - Adult  Goal: Free from fall injury  11/25/2024 1057 by Anu Parsons RN  Outcome: Progressing  11/24/2024 2203 by Vance Tompkins RN  Outcome: Progressing

## 2024-11-26 ENCOUNTER — APPOINTMENT (OUTPATIENT)
Dept: ULTRASOUND IMAGING | Age: 74
DRG: 057 | End: 2024-11-26
Attending: PHYSICAL MEDICINE & REHABILITATION
Payer: MEDICARE

## 2024-11-26 LAB
T4 FREE SERPL-MCNC: 1.18 NG/DL (ref 0.84–1.68)
TSH SERPL-MCNC: 1.3 UIU/ML (ref 0.44–3.86)

## 2024-11-26 PROCEDURE — 6370000000 HC RX 637 (ALT 250 FOR IP): Performed by: PHYSICAL MEDICINE & REHABILITATION

## 2024-11-26 PROCEDURE — 97110 THERAPEUTIC EXERCISES: CPT

## 2024-11-26 PROCEDURE — 99232 SBSQ HOSP IP/OBS MODERATE 35: CPT | Performed by: PHYSICIAN ASSISTANT

## 2024-11-26 PROCEDURE — 6370000000 HC RX 637 (ALT 250 FOR IP): Performed by: INTERNAL MEDICINE

## 2024-11-26 PROCEDURE — 2580000003 HC RX 258: Performed by: INTERNAL MEDICINE

## 2024-11-26 PROCEDURE — 97129 THER IVNTJ 1ST 15 MIN: CPT

## 2024-11-26 PROCEDURE — 84439 ASSAY OF FREE THYROXINE: CPT

## 2024-11-26 PROCEDURE — 97112 NEUROMUSCULAR REEDUCATION: CPT

## 2024-11-26 PROCEDURE — 86376 MICROSOMAL ANTIBODY EACH: CPT

## 2024-11-26 PROCEDURE — 97116 GAIT TRAINING THERAPY: CPT

## 2024-11-26 PROCEDURE — 97535 SELF CARE MNGMENT TRAINING: CPT

## 2024-11-26 PROCEDURE — 36415 COLL VENOUS BLD VENIPUNCTURE: CPT

## 2024-11-26 PROCEDURE — 84443 ASSAY THYROID STIM HORMONE: CPT

## 2024-11-26 PROCEDURE — 76536 US EXAM OF HEAD AND NECK: CPT

## 2024-11-26 PROCEDURE — 99232 SBSQ HOSP IP/OBS MODERATE 35: CPT | Performed by: PHYSICAL MEDICINE & REHABILITATION

## 2024-11-26 PROCEDURE — 1180000000 HC REHAB R&B

## 2024-11-26 PROCEDURE — 97130 THER IVNTJ EA ADDL 15 MIN: CPT

## 2024-11-26 RX ADMIN — Medication 2000 UNITS: at 15:50

## 2024-11-26 RX ADMIN — DOCUSATE SODIUM 100 MG: 100 CAPSULE, LIQUID FILLED ORAL at 20:37

## 2024-11-26 RX ADMIN — Medication 100 MG: at 09:04

## 2024-11-26 RX ADMIN — Medication 500 MG: at 09:05

## 2024-11-26 RX ADMIN — LEVETIRACETAM 1000 MG: 500 TABLET, FILM COATED ORAL at 20:37

## 2024-11-26 RX ADMIN — ACETAMINOPHEN 325MG 650 MG: 325 TABLET ORAL at 15:48

## 2024-11-26 RX ADMIN — ATORVASTATIN CALCIUM 20 MG: 20 TABLET, FILM COATED ORAL at 09:03

## 2024-11-26 RX ADMIN — Medication 5 MG: at 20:37

## 2024-11-26 RX ADMIN — ACETAMINOPHEN 325MG 650 MG: 325 TABLET ORAL at 20:36

## 2024-11-26 RX ADMIN — LEVETIRACETAM 1000 MG: 500 TABLET, FILM COATED ORAL at 09:04

## 2024-11-26 RX ADMIN — ACETAMINOPHEN 325MG 650 MG: 325 TABLET ORAL at 09:04

## 2024-11-26 RX ADMIN — LOSARTAN POTASSIUM 100 MG: 100 TABLET, FILM COATED ORAL at 09:10

## 2024-11-26 RX ADMIN — SODIUM CHLORIDE, PRESERVATIVE FREE 10 ML: 5 INJECTION INTRAVENOUS at 20:37

## 2024-11-26 RX ADMIN — AMLODIPINE BESYLATE 10 MG: 10 TABLET ORAL at 09:03

## 2024-11-26 ASSESSMENT — PAIN DESCRIPTION - LOCATION
LOCATION: GENERALIZED
LOCATION: GENERALIZED
LOCATION: SHOULDER

## 2024-11-26 ASSESSMENT — PAIN DESCRIPTION - DESCRIPTORS
DESCRIPTORS: ACHING

## 2024-11-26 ASSESSMENT — PAIN SCALES - GENERAL
PAINLEVEL_OUTOF10: 5
PAINLEVEL_OUTOF10: 1
PAINLEVEL_OUTOF10: 0
PAINLEVEL_OUTOF10: 4
PAINLEVEL_OUTOF10: 6
PAINLEVEL_OUTOF10: 3
PAINLEVEL_OUTOF10: 0

## 2024-11-26 ASSESSMENT — PAIN SCALES - WONG BAKER
WONGBAKER_NUMERICALRESPONSE: NO HURT

## 2024-11-26 NOTE — PLAN OF CARE
Problem: Pain  Goal: Verbalizes/displays adequate comfort level or baseline comfort level  11/26/2024 1314 by Gricelda Markham RN  Outcome: Progressing  11/25/2024 2325 by Dawn Gardner RN  Outcome: Progressing     Problem: ABCDS Injury Assessment  Goal: Absence of physical injury  11/26/2024 1314 by Gricelda Markham RN  Outcome: Progressing  11/25/2024 2325 by Dawn Gardner RN  Outcome: Progressing     Problem: Safety - Adult  Goal: Free from fall injury  11/26/2024 1314 by Gricelda Markham RN  Outcome: Progressing  11/25/2024 2325 by Dawn Gardner RN  Outcome: Progressing

## 2024-11-26 NOTE — PLAN OF CARE
Problem: Discharge Planning  Goal: Discharge to home or other facility with appropriate resources  11/25/2024 2325 by Dawn Gardner RN  Outcome: Progressing  11/25/2024 1057 by Anu Parsons RN  Outcome: Progressing     Problem: Pain  Goal: Verbalizes/displays adequate comfort level or baseline comfort level  11/25/2024 2325 by Dawn Gardner RN  Outcome: Progressing  11/25/2024 1057 by Anu Parsons RN  Outcome: Progressing     Problem: ABCDS Injury Assessment  Goal: Absence of physical injury  11/25/2024 2325 by Dawn Gardner RN  Outcome: Progressing  11/25/2024 1057 by Anu Parsons RN  Outcome: Progressing     Problem: Safety - Adult  Goal: Free from fall injury  11/25/2024 2325 by Dawn Gardner RN  Outcome: Progressing  11/25/2024 1057 by Anu Parsons RN  Outcome: Progressing

## 2024-11-26 NOTE — FLOWSHEET NOTE
Patient assessment completed.  See  head to toe flowsheet for assessment findings.  Currently resting quietly in bed, call light in reach, safety interventions in place, plan of care ongoing.  Electronically signed by Dawn Gardner RN on 11/25/2024 at 8:29 PM

## 2024-11-27 LAB — THYROPEROXIDASE IGG SERPL-ACNC: 7.3 IU/ML (ref 0–25)

## 2024-11-27 PROCEDURE — 97535 SELF CARE MNGMENT TRAINING: CPT

## 2024-11-27 PROCEDURE — 6370000000 HC RX 637 (ALT 250 FOR IP): Performed by: PHYSICAL MEDICINE & REHABILITATION

## 2024-11-27 PROCEDURE — 97530 THERAPEUTIC ACTIVITIES: CPT

## 2024-11-27 PROCEDURE — 97129 THER IVNTJ 1ST 15 MIN: CPT

## 2024-11-27 PROCEDURE — 1180000000 HC REHAB R&B

## 2024-11-27 PROCEDURE — 6370000000 HC RX 637 (ALT 250 FOR IP): Performed by: INTERNAL MEDICINE

## 2024-11-27 PROCEDURE — 97130 THER IVNTJ EA ADDL 15 MIN: CPT

## 2024-11-27 PROCEDURE — 99232 SBSQ HOSP IP/OBS MODERATE 35: CPT | Performed by: PHYSICAL MEDICINE & REHABILITATION

## 2024-11-27 PROCEDURE — 97116 GAIT TRAINING THERAPY: CPT

## 2024-11-27 RX ADMIN — Medication 100 MG: at 08:24

## 2024-11-27 RX ADMIN — LOSARTAN POTASSIUM 100 MG: 100 TABLET, FILM COATED ORAL at 08:23

## 2024-11-27 RX ADMIN — LEVETIRACETAM 1000 MG: 500 TABLET, FILM COATED ORAL at 08:24

## 2024-11-27 RX ADMIN — ACETAMINOPHEN 325MG 650 MG: 325 TABLET ORAL at 14:47

## 2024-11-27 RX ADMIN — LEVETIRACETAM 1000 MG: 500 TABLET, FILM COATED ORAL at 20:39

## 2024-11-27 RX ADMIN — Medication 2000 UNITS: at 17:33

## 2024-11-27 RX ADMIN — AMLODIPINE BESYLATE 10 MG: 10 TABLET ORAL at 08:24

## 2024-11-27 RX ADMIN — ACETAMINOPHEN 325MG 650 MG: 325 TABLET ORAL at 08:24

## 2024-11-27 RX ADMIN — DOCUSATE SODIUM 100 MG: 100 CAPSULE, LIQUID FILLED ORAL at 20:39

## 2024-11-27 RX ADMIN — Medication 500 MG: at 08:23

## 2024-11-27 RX ADMIN — ATORVASTATIN CALCIUM 20 MG: 20 TABLET, FILM COATED ORAL at 08:23

## 2024-11-27 RX ADMIN — Medication 5 MG: at 20:39

## 2024-11-27 ASSESSMENT — PAIN SCALES - WONG BAKER
WONGBAKER_NUMERICALRESPONSE: NO HURT

## 2024-11-27 ASSESSMENT — PAIN SCALES - GENERAL
PAINLEVEL_OUTOF10: 3

## 2024-11-27 ASSESSMENT — PAIN DESCRIPTION - ORIENTATION
ORIENTATION: RIGHT
ORIENTATION: RIGHT

## 2024-11-27 ASSESSMENT — PAIN DESCRIPTION - DESCRIPTORS
DESCRIPTORS: ACHING
DESCRIPTORS: ACHING

## 2024-11-27 ASSESSMENT — PAIN DESCRIPTION - LOCATION
LOCATION: GENERALIZED
LOCATION: GENERALIZED
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
## 2024-11-27 NOTE — FLOWSHEET NOTE
Patient assessment completed.  See head to toe flowsheet for assessment findings.  Currently resting quietly in bed, call light in reach, bed alarm engaged, safety interventions in place, denies any needs at present time, plan of care ongoing.  Electronically signed by Dawn Gardner RN on 11/26/2024 at 8:42 PM

## 2024-11-27 NOTE — PLAN OF CARE
Problem: Discharge Planning  Goal: Discharge to home or other facility with appropriate resources  Outcome: Progressing     Problem: Pain  Goal: Verbalizes/displays adequate comfort level or baseline comfort level  11/26/2024 2226 by Dawn Gardner RN  Outcome: Progressing  11/26/2024 1314 by Gricelda Markham RN  Outcome: Progressing     Problem: ABCDS Injury Assessment  Goal: Absence of physical injury  11/26/2024 2226 by Dawn Gardner RN  Outcome: Progressing  11/26/2024 1314 by Gricelda Markham RN  Outcome: Progressing     Problem: Safety - Adult  Goal: Free from fall injury  11/26/2024 2226 by Dawn Gardner RN  Outcome: Progressing  11/26/2024 1314 by Gricelda Markham RN  Outcome: Progressing

## 2024-11-27 NOTE — PLAN OF CARE
Problem: Pain  Goal: Verbalizes/displays adequate comfort level or baseline comfort level  11/27/2024 1113 by Gricelda Markham RN  Outcome: Progressing  11/26/2024 2226 by Dawn Gardner RN  Outcome: Progressing     Problem: ABCDS Injury Assessment  Goal: Absence of physical injury  11/27/2024 1113 by Gricelda Markham RN  Outcome: Progressing  11/26/2024 2226 by Dawn Gardner RN  Outcome: Progressing     Problem: Safety - Adult  Goal: Free from fall injury  11/27/2024 1113 by Gricelda Markham RN  Outcome: Progressing  11/26/2024 2226 by Dawn Gardner RN  Outcome: Progressing

## 2024-11-28 PROCEDURE — 6370000000 HC RX 637 (ALT 250 FOR IP): Performed by: INTERNAL MEDICINE

## 2024-11-28 PROCEDURE — 1180000000 HC REHAB R&B

## 2024-11-28 PROCEDURE — 6370000000 HC RX 637 (ALT 250 FOR IP): Performed by: PHYSICAL MEDICINE & REHABILITATION

## 2024-11-28 PROCEDURE — 99232 SBSQ HOSP IP/OBS MODERATE 35: CPT | Performed by: PHYSICAL MEDICINE & REHABILITATION

## 2024-11-28 RX ADMIN — Medication 500 MG: at 07:47

## 2024-11-28 RX ADMIN — Medication 5 MG: at 20:47

## 2024-11-28 RX ADMIN — LOSARTAN POTASSIUM 100 MG: 100 TABLET, FILM COATED ORAL at 07:47

## 2024-11-28 RX ADMIN — AMLODIPINE BESYLATE 10 MG: 10 TABLET ORAL at 07:47

## 2024-11-28 RX ADMIN — Medication 100 MG: at 07:47

## 2024-11-28 RX ADMIN — ACETAMINOPHEN 325MG 650 MG: 325 TABLET ORAL at 20:47

## 2024-11-28 RX ADMIN — Medication 2000 UNITS: at 15:51

## 2024-11-28 RX ADMIN — ATORVASTATIN CALCIUM 20 MG: 20 TABLET, FILM COATED ORAL at 07:47

## 2024-11-28 RX ADMIN — ACETAMINOPHEN 325MG 650 MG: 325 TABLET ORAL at 15:50

## 2024-11-28 RX ADMIN — ACETAMINOPHEN 325MG 650 MG: 325 TABLET ORAL at 07:56

## 2024-11-28 RX ADMIN — LEVETIRACETAM 1000 MG: 500 TABLET, FILM COATED ORAL at 20:47

## 2024-11-28 RX ADMIN — LEVETIRACETAM 1000 MG: 500 TABLET, FILM COATED ORAL at 07:47

## 2024-11-28 ASSESSMENT — PAIN DESCRIPTION - ORIENTATION
ORIENTATION: RIGHT

## 2024-11-28 ASSESSMENT — PAIN DESCRIPTION - LOCATION
LOCATION: ARM;SHOULDER
LOCATION: ARM;SHOULDER

## 2024-11-28 ASSESSMENT — PAIN DESCRIPTION - DESCRIPTORS
DESCRIPTORS: ACHING
DESCRIPTORS: ACHING;DISCOMFORT
DESCRIPTORS: ACHING

## 2024-11-28 ASSESSMENT — PAIN SCALES - GENERAL
PAINLEVEL_OUTOF10: 4
PAINLEVEL_OUTOF10: 4
PAINLEVEL_OUTOF10: 3

## 2024-11-29 PROCEDURE — 97535 SELF CARE MNGMENT TRAINING: CPT

## 2024-11-29 PROCEDURE — 6370000000 HC RX 637 (ALT 250 FOR IP): Performed by: PHYSICAL MEDICINE & REHABILITATION

## 2024-11-29 PROCEDURE — 1180000000 HC REHAB R&B

## 2024-11-29 PROCEDURE — 97110 THERAPEUTIC EXERCISES: CPT

## 2024-11-29 PROCEDURE — 97116 GAIT TRAINING THERAPY: CPT

## 2024-11-29 PROCEDURE — 99232 SBSQ HOSP IP/OBS MODERATE 35: CPT | Performed by: PHYSICAL MEDICINE & REHABILITATION

## 2024-11-29 PROCEDURE — 97130 THER IVNTJ EA ADDL 15 MIN: CPT

## 2024-11-29 PROCEDURE — 6370000000 HC RX 637 (ALT 250 FOR IP): Performed by: INTERNAL MEDICINE

## 2024-11-29 PROCEDURE — 97112 NEUROMUSCULAR REEDUCATION: CPT

## 2024-11-29 PROCEDURE — 97530 THERAPEUTIC ACTIVITIES: CPT

## 2024-11-29 PROCEDURE — 97129 THER IVNTJ 1ST 15 MIN: CPT

## 2024-11-29 RX ORDER — ACETAMINOPHEN 500 MG
500 TABLET ORAL 3 TIMES DAILY
Status: DISCONTINUED | OUTPATIENT
Start: 2024-11-29 | End: 2024-12-03 | Stop reason: HOSPADM

## 2024-11-29 RX ADMIN — ATORVASTATIN CALCIUM 20 MG: 20 TABLET, FILM COATED ORAL at 08:47

## 2024-11-29 RX ADMIN — ACETAMINOPHEN 325MG 650 MG: 325 TABLET ORAL at 04:21

## 2024-11-29 RX ADMIN — ACETAMINOPHEN 500 MG: 500 TABLET ORAL at 19:29

## 2024-11-29 RX ADMIN — Medication 2000 UNITS: at 17:17

## 2024-11-29 RX ADMIN — LEVETIRACETAM 1000 MG: 500 TABLET, FILM COATED ORAL at 19:29

## 2024-11-29 RX ADMIN — LOSARTAN POTASSIUM 100 MG: 100 TABLET, FILM COATED ORAL at 08:47

## 2024-11-29 RX ADMIN — Medication 500 MG: at 08:46

## 2024-11-29 RX ADMIN — Medication 100 MG: at 08:46

## 2024-11-29 RX ADMIN — LEVETIRACETAM 1000 MG: 500 TABLET, FILM COATED ORAL at 08:47

## 2024-11-29 RX ADMIN — AMLODIPINE BESYLATE 10 MG: 10 TABLET ORAL at 08:47

## 2024-11-29 RX ADMIN — ACETAMINOPHEN 325MG 650 MG: 325 TABLET ORAL at 08:49

## 2024-11-29 RX ADMIN — DOCUSATE SODIUM 100 MG: 100 CAPSULE, LIQUID FILLED ORAL at 19:29

## 2024-11-29 RX ADMIN — ACETAMINOPHEN 500 MG: 500 TABLET ORAL at 14:23

## 2024-11-29 RX ADMIN — Medication 5 MG: at 19:29

## 2024-11-29 ASSESSMENT — PAIN DESCRIPTION - LOCATION
LOCATION: ARM;SHOULDER
LOCATION: ARM;SHOULDER
LOCATION: GENERALIZED

## 2024-11-29 ASSESSMENT — PAIN SCALES - GENERAL
PAINLEVEL_OUTOF10: 5
PAINLEVEL_OUTOF10: 4
PAINLEVEL_OUTOF10: 4
PAINLEVEL_OUTOF10: 5

## 2024-11-29 ASSESSMENT — PAIN DESCRIPTION - DESCRIPTORS
DESCRIPTORS: ACHING
DESCRIPTORS: ACHING
DESCRIPTORS: DISCOMFORT

## 2024-11-29 ASSESSMENT — PAIN DESCRIPTION - ORIENTATION
ORIENTATION: RIGHT
ORIENTATION: RIGHT

## 2024-11-30 PROCEDURE — 6370000000 HC RX 637 (ALT 250 FOR IP): Performed by: PHYSICAL MEDICINE & REHABILITATION

## 2024-11-30 PROCEDURE — 97535 SELF CARE MNGMENT TRAINING: CPT

## 2024-11-30 PROCEDURE — 97110 THERAPEUTIC EXERCISES: CPT

## 2024-11-30 PROCEDURE — 97116 GAIT TRAINING THERAPY: CPT

## 2024-11-30 PROCEDURE — 1180000000 HC REHAB R&B

## 2024-11-30 PROCEDURE — 6370000000 HC RX 637 (ALT 250 FOR IP): Performed by: INTERNAL MEDICINE

## 2024-11-30 PROCEDURE — 99232 SBSQ HOSP IP/OBS MODERATE 35: CPT | Performed by: PHYSICAL MEDICINE & REHABILITATION

## 2024-11-30 RX ADMIN — ACETAMINOPHEN 500 MG: 500 TABLET ORAL at 10:04

## 2024-11-30 RX ADMIN — Medication 100 MG: at 07:42

## 2024-11-30 RX ADMIN — ACETAMINOPHEN 500 MG: 500 TABLET ORAL at 20:24

## 2024-11-30 RX ADMIN — LEVETIRACETAM 1000 MG: 500 TABLET, FILM COATED ORAL at 20:24

## 2024-11-30 RX ADMIN — ATORVASTATIN CALCIUM 20 MG: 20 TABLET, FILM COATED ORAL at 07:42

## 2024-11-30 RX ADMIN — LOSARTAN POTASSIUM 100 MG: 100 TABLET, FILM COATED ORAL at 07:42

## 2024-11-30 RX ADMIN — ACETAMINOPHEN 500 MG: 500 TABLET ORAL at 14:18

## 2024-11-30 RX ADMIN — AMLODIPINE BESYLATE 10 MG: 10 TABLET ORAL at 07:42

## 2024-11-30 RX ADMIN — Medication 5 MG: at 20:24

## 2024-11-30 RX ADMIN — LEVETIRACETAM 1000 MG: 500 TABLET, FILM COATED ORAL at 07:42

## 2024-11-30 RX ADMIN — ACETAMINOPHEN 325MG 650 MG: 325 TABLET ORAL at 05:49

## 2024-11-30 RX ADMIN — Medication 2000 UNITS: at 17:10

## 2024-11-30 RX ADMIN — Medication 500 MG: at 07:42

## 2024-11-30 ASSESSMENT — PAIN DESCRIPTION - DESCRIPTORS
DESCRIPTORS: ACHING

## 2024-11-30 ASSESSMENT — PAIN SCALES - GENERAL
PAINLEVEL_OUTOF10: 3
PAINLEVEL_OUTOF10: 5
PAINLEVEL_OUTOF10: 3

## 2024-11-30 ASSESSMENT — PAIN DESCRIPTION - ORIENTATION
ORIENTATION: RIGHT

## 2024-11-30 ASSESSMENT — PAIN DESCRIPTION - LOCATION
LOCATION: GENERALIZED
LOCATION: ARM;SHOULDER
LOCATION: ARM;SHOULDER

## 2024-11-30 NOTE — PLAN OF CARE
Problem: Discharge Planning  Goal: Discharge to home or other facility with appropriate resources  11/30/2024 1033 by Mishel Mg RN  Outcome: Progressing  11/29/2024 2142 by Shireen Meyers RN  Outcome: Progressing     Problem: Pain  Goal: Verbalizes/displays adequate comfort level or baseline comfort level  11/30/2024 1033 by Mishel Mg RN  Outcome: Progressing  11/29/2024 2142 by Shireen Meyers RN  Outcome: Progressing     Problem: ABCDS Injury Assessment  Goal: Absence of physical injury  11/30/2024 1033 by Mishel Mg RN  Outcome: Progressing  11/29/2024 2142 by Shireen Meyers RN  Outcome: Progressing     Problem: Safety - Adult  Goal: Free from fall injury  11/30/2024 1033 by Mishel Mg RN  Outcome: Progressing  11/29/2024 2142 by Shireen Meyers RN  Outcome: Progressing

## 2024-12-01 PROCEDURE — 6370000000 HC RX 637 (ALT 250 FOR IP): Performed by: PHYSICAL MEDICINE & REHABILITATION

## 2024-12-01 PROCEDURE — 97110 THERAPEUTIC EXERCISES: CPT

## 2024-12-01 PROCEDURE — 97116 GAIT TRAINING THERAPY: CPT

## 2024-12-01 PROCEDURE — 6370000000 HC RX 637 (ALT 250 FOR IP): Performed by: INTERNAL MEDICINE

## 2024-12-01 PROCEDURE — 1180000000 HC REHAB R&B

## 2024-12-01 PROCEDURE — 97530 THERAPEUTIC ACTIVITIES: CPT

## 2024-12-01 RX ADMIN — ACETAMINOPHEN 500 MG: 500 TABLET ORAL at 13:15

## 2024-12-01 RX ADMIN — AMLODIPINE BESYLATE 10 MG: 10 TABLET ORAL at 08:14

## 2024-12-01 RX ADMIN — ACETAMINOPHEN 500 MG: 500 TABLET ORAL at 20:38

## 2024-12-01 RX ADMIN — Medication 2000 UNITS: at 17:06

## 2024-12-01 RX ADMIN — ATORVASTATIN CALCIUM 20 MG: 20 TABLET, FILM COATED ORAL at 08:15

## 2024-12-01 RX ADMIN — LEVETIRACETAM 1000 MG: 500 TABLET, FILM COATED ORAL at 20:39

## 2024-12-01 RX ADMIN — LOSARTAN POTASSIUM 100 MG: 100 TABLET, FILM COATED ORAL at 08:14

## 2024-12-01 RX ADMIN — ACETAMINOPHEN 500 MG: 500 TABLET ORAL at 08:15

## 2024-12-01 RX ADMIN — Medication 5 MG: at 20:41

## 2024-12-01 RX ADMIN — Medication 100 MG: at 08:14

## 2024-12-01 RX ADMIN — ACETAMINOPHEN 325MG 650 MG: 325 TABLET ORAL at 01:00

## 2024-12-01 RX ADMIN — LEVETIRACETAM 1000 MG: 500 TABLET, FILM COATED ORAL at 08:14

## 2024-12-01 RX ADMIN — Medication 500 MG: at 08:15

## 2024-12-01 ASSESSMENT — PAIN DESCRIPTION - DESCRIPTORS
DESCRIPTORS: DULL;ACHING
DESCRIPTORS: ACHING;DULL
DESCRIPTORS: ACHING
DESCRIPTORS: ACHING

## 2024-12-01 ASSESSMENT — PAIN DESCRIPTION - LOCATION
LOCATION: ARM;SHOULDER
LOCATION: SHOULDER;ARM

## 2024-12-01 ASSESSMENT — PAIN SCALES - GENERAL
PAINLEVEL_OUTOF10: 3
PAINLEVEL_OUTOF10: 4
PAINLEVEL_OUTOF10: 4
PAINLEVEL_OUTOF10: 5

## 2024-12-01 ASSESSMENT — PAIN DESCRIPTION - ORIENTATION
ORIENTATION: RIGHT

## 2024-12-01 NOTE — PLAN OF CARE
Problem: Discharge Planning  Goal: Discharge to home or other facility with appropriate resources  12/1/2024 1122 by Mishel Mg RN  Outcome: Progressing  12/1/2024 0105 by Brice Daley RN  Outcome: Progressing     Problem: Pain  Goal: Verbalizes/displays adequate comfort level or baseline comfort level  12/1/2024 1122 by Mishel Mg RN  Outcome: Progressing  12/1/2024 0105 by Brice Daley RN  Outcome: Progressing     Problem: ABCDS Injury Assessment  Goal: Absence of physical injury  12/1/2024 1122 by Mishel Mg RN  Outcome: Progressing  12/1/2024 0105 by Brice Daley RN  Outcome: Progressing     Problem: Safety - Adult  Goal: Free from fall injury  12/1/2024 1122 by Mishel Mg RN  Outcome: Progressing  12/1/2024 0105 by Brice Daley RN  Outcome: Progressing

## 2024-12-02 PROCEDURE — 6370000000 HC RX 637 (ALT 250 FOR IP): Performed by: PHYSICAL MEDICINE & REHABILITATION

## 2024-12-02 PROCEDURE — 97129 THER IVNTJ 1ST 15 MIN: CPT

## 2024-12-02 PROCEDURE — 97112 NEUROMUSCULAR REEDUCATION: CPT

## 2024-12-02 PROCEDURE — 97130 THER IVNTJ EA ADDL 15 MIN: CPT

## 2024-12-02 PROCEDURE — 99233 SBSQ HOSP IP/OBS HIGH 50: CPT | Performed by: PHYSICAL MEDICINE & REHABILITATION

## 2024-12-02 PROCEDURE — 6370000000 HC RX 637 (ALT 250 FOR IP): Performed by: INTERNAL MEDICINE

## 2024-12-02 PROCEDURE — 97116 GAIT TRAINING THERAPY: CPT

## 2024-12-02 PROCEDURE — 97535 SELF CARE MNGMENT TRAINING: CPT

## 2024-12-02 PROCEDURE — 6360000002 HC RX W HCPCS: Performed by: PHYSICAL MEDICINE & REHABILITATION

## 2024-12-02 PROCEDURE — 97110 THERAPEUTIC EXERCISES: CPT

## 2024-12-02 PROCEDURE — 1180000000 HC REHAB R&B

## 2024-12-02 RX ADMIN — LOSARTAN POTASSIUM 100 MG: 100 TABLET, FILM COATED ORAL at 08:58

## 2024-12-02 RX ADMIN — Medication 500 MG: at 08:59

## 2024-12-02 RX ADMIN — LEVETIRACETAM 1000 MG: 500 TABLET, FILM COATED ORAL at 21:35

## 2024-12-02 RX ADMIN — ACETAMINOPHEN 325MG 650 MG: 325 TABLET ORAL at 06:15

## 2024-12-02 RX ADMIN — CYANOCOBALAMIN 1000 MCG: 1000 INJECTION, SOLUTION INTRAMUSCULAR; SUBCUTANEOUS at 08:58

## 2024-12-02 RX ADMIN — Medication 100 MG: at 08:59

## 2024-12-02 RX ADMIN — ACETAMINOPHEN 500 MG: 500 TABLET ORAL at 21:37

## 2024-12-02 RX ADMIN — ATORVASTATIN CALCIUM 20 MG: 20 TABLET, FILM COATED ORAL at 08:59

## 2024-12-02 RX ADMIN — LEVETIRACETAM 1000 MG: 500 TABLET, FILM COATED ORAL at 08:58

## 2024-12-02 RX ADMIN — Medication 2000 UNITS: at 17:09

## 2024-12-02 RX ADMIN — AMLODIPINE BESYLATE 10 MG: 10 TABLET ORAL at 08:59

## 2024-12-02 RX ADMIN — Medication 5 MG: at 21:34

## 2024-12-02 RX ADMIN — ACETAMINOPHEN 325MG 650 MG: 325 TABLET ORAL at 17:09

## 2024-12-02 ASSESSMENT — PAIN SCALES - GENERAL
PAINLEVEL_OUTOF10: 0
PAINLEVEL_OUTOF10: 5
PAINLEVEL_OUTOF10: 4

## 2024-12-02 ASSESSMENT — PAIN DESCRIPTION - LOCATION
LOCATION: GENERALIZED
LOCATION: ARM;SHOULDER

## 2024-12-02 ASSESSMENT — PAIN DESCRIPTION - ORIENTATION
ORIENTATION: RIGHT
ORIENTATION: RIGHT

## 2024-12-02 ASSESSMENT — PAIN DESCRIPTION - DESCRIPTORS
DESCRIPTORS: ACHING
DESCRIPTORS: ACHING

## 2024-12-02 NOTE — DISCHARGE INSTR - COC
11/22/24    Readmission Risk Assessment Score:  Readmission Risk              Risk of Unplanned Readmission:  15           Discharging to Facility/ Agency   Name: [facility]  Address:  Phone:[phone]  Fax:    Dialysis Facility (if applicable)   Name:  Address:  Dialysis Schedule:  Phone:  Fax:    / signature: Electronically signed by Indu Hector RN on 12/2/24 at 11:47 AM EST    PHYSICIAN SECTION    Prognosis: Good    Condition at Discharge: Stable    Rehab Potential (if transferring to Rehab):     Recommended Labs or Other Treatments After Discharge:     Physician Certification: I certify the above information and transfer of Anahi Willett  is necessary for the continuing treatment of the diagnosis listed and that she requires Home Care for less 30 days.     Update Admission H&P: No change in H&P    PHYSICIAN SIGNATURE:  Miesha Johns DO

## 2024-12-02 NOTE — DISCHARGE INSTR - DIET
Good nutrition is important when healing from an illness, injury, or surgery.  Follow any nutrition recommendations given to you during your hospital stay.   If you were given an oral nutrition supplement while in the hospital, continue to take this supplement at home.  You can take it with meals, in-between meals, and/or before bedtime. These supplements can be purchased at most local grocery stores, pharmacies, and chain [employer]-stores.   If you have any questions about your diet or nutrition, call the hospital and ask for the dietitian.    ADULT DIET; Regular

## 2024-12-02 NOTE — PLAN OF CARE
Problem: Pain  Goal: Verbalizes/displays adequate comfort level or baseline comfort level  12/2/2024 1246 by Gricelda Markham RN  Outcome: Progressing  12/1/2024 2249 by Brice Daley RN  Outcome: Progressing     Problem: ABCDS Injury Assessment  Goal: Absence of physical injury  12/2/2024 1246 by Gricelda Markham RN  Outcome: Progressing  12/1/2024 2249 by Brice Daley, RN  Outcome: Progressing     Problem: Safety - Adult  Goal: Free from fall injury  12/2/2024 1246 by Gricelda Markham RN  Outcome: Progressing  12/1/2024 2249 by Brice Daley, RN  Outcome: Progressing

## 2024-12-02 NOTE — PLAN OF CARE
Problem: Discharge Planning  Goal: Discharge to home or other facility with appropriate resources  12/1/2024 2249 by Brice Daley RN  Outcome: Progressing  12/1/2024 1122 by Mishel Mg RN  Outcome: Progressing     Problem: Pain  Goal: Verbalizes/displays adequate comfort level or baseline comfort level  12/1/2024 2249 by Brice Daley RN  Outcome: Progressing  12/1/2024 1122 by Mishel Mg RN  Outcome: Progressing     Problem: ABCDS Injury Assessment  Goal: Absence of physical injury  12/1/2024 2249 by Brice Daley RN  Outcome: Progressing  12/1/2024 1122 by Mishel Mg RN  Outcome: Progressing     Problem: Safety - Adult  Goal: Free from fall injury  12/1/2024 2249 by Brice Daley RN  Outcome: Progressing  12/1/2024 1122 by Mishel Mg RN  Outcome: Progressing

## 2024-12-03 VITALS
HEIGHT: 67 IN | SYSTOLIC BLOOD PRESSURE: 140 MMHG | WEIGHT: 115.96 LBS | HEART RATE: 82 BPM | BODY MASS INDEX: 18.2 KG/M2 | OXYGEN SATURATION: 95 % | DIASTOLIC BLOOD PRESSURE: 70 MMHG | TEMPERATURE: 98.1 F | RESPIRATION RATE: 18 BRPM

## 2024-12-03 PROCEDURE — 6370000000 HC RX 637 (ALT 250 FOR IP): Performed by: PHYSICAL MEDICINE & REHABILITATION

## 2024-12-03 PROCEDURE — 97110 THERAPEUTIC EXERCISES: CPT

## 2024-12-03 PROCEDURE — 97535 SELF CARE MNGMENT TRAINING: CPT

## 2024-12-03 PROCEDURE — 6370000000 HC RX 637 (ALT 250 FOR IP): Performed by: INTERNAL MEDICINE

## 2024-12-03 PROCEDURE — 99238 HOSP IP/OBS DSCHRG MGMT 30/<: CPT | Performed by: PHYSICAL MEDICINE & REHABILITATION

## 2024-12-03 PROCEDURE — 97116 GAIT TRAINING THERAPY: CPT

## 2024-12-03 RX ORDER — AMLODIPINE BESYLATE 10 MG/1
10 TABLET ORAL DAILY
Qty: 30 TABLET | Refills: 3 | Status: SHIPPED | OUTPATIENT
Start: 2024-12-04

## 2024-12-03 RX ORDER — LEVETIRACETAM 1000 MG/1
1000 TABLET ORAL 2 TIMES DAILY
Qty: 60 TABLET | Refills: 3 | Status: SHIPPED | OUTPATIENT
Start: 2024-12-03

## 2024-12-03 RX ORDER — CHOLECALCIFEROL (VITAMIN D3) 50 MCG
2000 TABLET ORAL
Qty: 60 TABLET | Refills: 5 | Status: SHIPPED | OUTPATIENT
Start: 2024-12-03

## 2024-12-03 RX ADMIN — LEVETIRACETAM 1000 MG: 500 TABLET, FILM COATED ORAL at 09:07

## 2024-12-03 RX ADMIN — Medication 500 MG: at 09:06

## 2024-12-03 RX ADMIN — AMLODIPINE BESYLATE 10 MG: 10 TABLET ORAL at 09:06

## 2024-12-03 RX ADMIN — Medication 100 MG: at 09:06

## 2024-12-03 RX ADMIN — LOSARTAN POTASSIUM 100 MG: 100 TABLET, FILM COATED ORAL at 09:09

## 2024-12-03 RX ADMIN — ATORVASTATIN CALCIUM 20 MG: 20 TABLET, FILM COATED ORAL at 09:07

## 2024-12-03 RX ADMIN — ACETAMINOPHEN 500 MG: 500 TABLET ORAL at 09:09

## 2024-12-03 ASSESSMENT — PAIN DESCRIPTION - LOCATION: LOCATION: GENERALIZED

## 2024-12-03 ASSESSMENT — PAIN SCALES - GENERAL: PAINLEVEL_OUTOF10: 3

## 2024-12-03 ASSESSMENT — PAIN DESCRIPTION - DESCRIPTORS: DESCRIPTORS: ACHING

## 2024-12-03 NOTE — PLAN OF CARE
Problem: Pain  Goal: Verbalizes/displays adequate comfort level or baseline comfort level  12/3/2024 0931 by Gricelda Markham RN  Outcome: Progressing  12/3/2024 0441 by Marta Arreola RN  Outcome: Progressing     Problem: ABCDS Injury Assessment  Goal: Absence of physical injury  12/3/2024 0931 by Gricelda Markham, RN  Outcome: Progressing  12/3/2024 0441 by Marta Arreola RN  Outcome: Progressing     Problem: Safety - Adult  Goal: Free from fall injury  Outcome: Progressing

## 2024-12-03 NOTE — DISCHARGE SUMMARY
eliminating polypharmacy and weaning to the lowest effective dose of pain medications and eliminating the concomitant use of benzodiazepines.  I see   no medications of concern.  I see   no habits of combining sedatives and narcotics.  Controlled Substance Monitoring:    Acute and Chronic Pain Monitoring:        No data to display                      ROS x10:  The patient also complains of severely impaired mobility and activities of daily living.  Otherwise no new problems with vision, hearing, nose, mouth, throat, dermal, cardiovascular, GI, , pulmonary, musculoskeletal, psychiatric or neurological. See Rehab H&P on Rehab chart dated .       Vital signs:  BP (!) 140/70   Pulse 82   Temp 98.1 °F (36.7 °C) (Oral)   Resp 18   Ht 1.702 m (5' 7\")   Wt 52.6 kg (115 lb 15.4 oz)   SpO2 95%   BMI 18.16 kg/m²   I/O:   PO/Intake:  fair PO intake, no problems observed or reported.  Regular diet    Bowel/Bladder:  continent, constipation and urinary urgency.  General:  Patient is well developed, adequately nourished, non-obese and     well kempt.     HEENT:    PERRLA, hearing intact to loud voice, external inspection of ear     and nose benign.  Inspection of lips, tongue and gums    Musculoskeletal: No significant change in strength or tone.  All joints stable.      Inspection and palpation of digits and nails show no clubbing,       cyanosis or inflammatory conditions.   Neuro/Psychiatric: Affect: flat.  Alert and oriented to person, place and     situation.  No significant change in deep tendon reflexes or     Sensation  -right sided weakness-improving  Lungs:  Diminished, CTA-B. Respiration effort is normal at rest.     Heart:   S1 = S2, RRR.  No loud murmurs.    Abdomen:  Soft, non-tender, no enlargement of liver or spleen.  Extremities:  Trace lower extremity edema,    tenderness.  Skin:   Intact to general survey, no visualized or palpated problems  .    Rehabilitation:  Physical therapy: FIMS:  Bed Mobility:

## 2024-12-03 NOTE — PLAN OF CARE
Problem: Discharge Planning  Goal: Discharge to home or other facility with appropriate resources  12/3/2024 0938 by Gricelda Markham RN  Outcome: Progressing  12/3/2024 0441 by Marta Arreola RN  Outcome: Progressing     Problem: Pain  Goal: Verbalizes/displays adequate comfort level or baseline comfort level  12/3/2024 0938 by Gricelda Markham RN  Outcome: Progressing  12/3/2024 0931 by Gricelda Markham RN  Outcome: Progressing  12/3/2024 0441 by Marta Arreola RN  Outcome: Progressing     Problem: ABCDS Injury Assessment  Goal: Absence of physical injury  12/3/2024 0938 by Grieclda Markham RN  Outcome: Progressing  12/3/2024 0931 by Gricelda Markham RN  Outcome: Progressing  12/3/2024 0441 by Marta Arreola RN  Outcome: Progressing     Problem: Safety - Adult  Goal: Free from fall injury  12/3/2024 0938 by Gricelda Markham RN  Outcome: Progressing  12/3/2024 0931 by Gricelda Markham RN  Outcome: Progressing     Problem: Skin/Tissue Integrity  Goal: Absence of new skin breakdown  Description: 1.  Monitor for areas of redness and/or skin breakdown  2.  Assess vascular access sites hourly  3.  Every 4-6 hours minimum:  Change oxygen saturation probe site  4.  Every 4-6 hours:  If on nasal continuous positive airway pressure, respiratory therapy assess nares and determine need for appliance change or resting period.  Outcome: Progressing

## 2024-12-03 NOTE — FLOWSHEET NOTE
Discharge instruction given. Instructed on follow up appointments and medications. Patient verbalized understanding. Patient left room via wheelchair with all belongings.

## 2024-12-04 NOTE — CARE COORDINATION
Case Management Initial Assessment        NAME:  Anahi Willett  ROOM: R261/R261-01  :  1950  DATE: 2024        Social Functional:  Social/Functional History  Lives With: Alone  Type of Home: House  Home Layout: One level  Home Access: Stairs to enter without rails (in garage; pt does not use front door)  Entrance Stairs - Number of Steps: 2  Bathroom Shower/Tub: Tub/Shower unit;Curtain  Bathroom Toilet: Standard  Bathroom Equipment: Grab bars in shower;Hand-held shower  Bathroom Accessibility: Walker accessible  Home Equipment: None  ADL Assistance: Independent  Homemaking Assistance: Independent  Homemaking Responsibilities: Yes  Meal Prep Responsibility: Primary  Laundry Responsibility: Primary (first floor)  Cleaning Responsibility: Primary  Bill Paying/Finance Responsibility: Primary (writes checks, goes to [facility] for utilities)  Shopping Responsibility: Primary (dtr stated that she will be going with pt at discharge)  Health Care Management: Primary (puts two pills in a shotglass daily)  Ambulation Assistance: Independent  Transfer Assistance: Independent  Active : Yes  Mode of Transportation: Car;Truck (dtr drives a F-150)  Education: high school education  Occupation: Retired  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 Patient questions answered appropriately. Explained discharge process. Patient stated understanding. Pt's dtr Anu was also present during the assessment. 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 Her support system consists of her dtr (Anu), MATTY (Henry), and granddaughter (Mishel). Pt reported that her dtr Nannette passed away in 2020 from cancer and then pt's spouse passed away in 2020 from 
   [facility]  INPATIENT REHABILITATION  UPDATE NOTE  Room: R261/R261-01  Admit Date: 2024       Date: 2024  Patient Name: Anahi Willett        MRN: [id number]    : 1950  (74 y.o.)  Gender: female        Anticipated Discharge Plan: Pt to d/c 12/3 home alone. Pt is being followed by PMR, PT, OT,  SLP, LSW, RN Case Manager, Endocrinology, Dietician, and Hospitalist. We believe pt would benefit from continued rehab in order to maximize overall function to ensure a safe discharge. Family training to be scheduled.    REHAB DIAGNOSIS:   Diagnosis: : Impaired mobility and ADL's due to Traumatic left frontal intracerebral hemorrhage    CO MORBIDITIES:    Past Medical History:   Diagnosis Date    Cervical cancer (HCC)     COPD (chronic obstructive pulmonary disease) (HCC)     Hypertension      Past Surgical History:   Procedure Laterality Date    APPENDECTOMY      CARPAL TUNNEL RELEASE Left     HYSTERECTOMY (CERVIX STATUS UNKNOWN)          Last set of vitals:  Vital Signs  Temp: 98.2 °F (36.8 °C)  Temp Source: Oral  Pulse: 76  Heart Rate Source: Monitor  Respirations: 18  BP: (!) 145/73  MAP (Calculated): 97  MAP (mmHg): 93  BP Location: Right upper arm  BP Method: Automatic  Patient Position: Sitting    Results/Findings:   Labs:   No results for input(s): \"WBC\", \"HGB\", \"HCT\", \"PLT\" in the last 72 hours.  No results for input(s): \"NA\", \"K\", \"CL\", \"CO2\", \"BUN\", \"CREATININE\", \"CALCIUM\", \"PHOS\" in the last 72 hours.    Invalid input(s): \"MAGNES\"  No results for input(s): \"AST\", \"ALT\", \"BILIDIR\", \"BILITOT\", \"ALKPHOS\" in the last 72 hours.  No results for input(s): \"INR\" in the last 72 hours.  No results for input(s): \"CKTOTAL\", \"TROPONINI\" in the last 72 hours.    Urinalysis:      Lab Results   Component Value Date/Time    NITRU Negative 2024 11:04 AM    BLOODU Negative 2024 11:04 AM    SPECGRAV 1.030 2018 01:03 PM    GLUCOSEU Negative 2024 11:04 AM 
CM spoke with pt and  pt aware the discharge is for 12/3. Pt was updated with goals and progress. CM then called and spoke with daughter. Daughter aware of the discharge date and how pt is doing. Electronically signed by Indu Hector RN on 11/26/2024 at 3:23 PM   
[facility]  INPATIENT REHABILITATION  INDEPENDENT IN ROOM NOTE  Room: R261/R261-01  Admit Date: 2024       Date: 2024  Patient Name: Anahi Willett        MRN: [id number]    : 1950  (74 y.o.)  Gender: female      Diagnosis: : Impaired mobility and ADL's due to Traumatic left frontal intracerebral hemorrhage               Patient orientation to the independent living suite(256)  [] Yes    []   No    [x] NA  (Safety checks to consider: Oxygen, Fire Alarm, Stove safety, Call alarm, Bed alarm,   Bed power, TV, Phone)       Nursin. Pt physical ability to be independent in room/suite:  [x] Yes    []   No   Comment:    2. Pt demonstrates cognitive ability to be independent in room/suite:  [x] Yes    []   No   Comment:    3. Pt demonstrates emotional ability to be independent in room/suite:  [x] Yes    []   No   Comment:    4. Special Considerations/Equipment if needed: [x] NA   Comment:    Recommend independent in room/suite:  [x] Yes    []   No            Signature: Electronically signed by Gricelda Markham RN on 2024 at 5:33 PM        Occupational Therapy:  1. Pt physical ability to be independent in room/suite:  [x] Yes    []   No   Comment:     2. Pt demonstrates cognitive ability to be independent in room/suite:  [x] Yes    []   No   Comment:    3. Pt demonstrates emotional ability to be independent in room/suite:  [x] Yes    []   No   Comment:    4. Special Considerations/Equipment if needed: [] NA   Comment: Patient should not have the leg rests on the wheelchair if she is sitting in that. Recommend patient sit in chairs other than the wheelchair    Recommend independent in room/suite:  [x] Yes    []   No       Signature: Electronically signed by BRADEN Anderson on 24 at 12:03 PM EST       Physical Therapy:  1. Pt physical ability to be independent in room/suite:  [x] Yes    []   No   Comment:     2. Pt demonstrates cognitive ability to be 
VIANNEYW met with pt and confirmed discharge for today, pt stated that she is ready. RL provided a ww from [facility] [place] [place] and she signed for it. Referral made to [facility] and orders placed, Kerline confirmed it was received and they accepted. Patient satisfaction survey completed.   
      LTG:  Long Term Goals  Time Frame for Long Term Goals: 2-3 weeks  Goal 1: pt  will demonstrate functional cognitive-linguistic abilities in all opportunities at a ind level in order to safely complete ADLs.  Long-term Goals  Timeframe for Long-term Goals: N/A          COGNITION  OT: Cognition  Overall Cognitive Status: WFL (11/25/24 1325)  Cognition Comment: Patient midlly impulsive at times (11/25/24 1124)          Orientation  Overall Orientation Status: Within Functional Limits (11/25/24 1325)  Orientation Level: Oriented X4 (11/25/24 1325)  SP:Memory: Exceptions to WFL        Problem Solving: Exceptions to WFL    RECREATIONAL THERAPY  Attendance to recreational therapy programs:    []  Pet Therapy  [] Music Therapy  [] Art Therapy    [] Recreation Therapy Group [] Support Group           Patient social interaction (mood, participation): good, motivated    Patient strengths: independent prior    Patients goal: return home    Problems/Barriers: RLE weakness and motor control difficulties, stairs to enter home, lives alone, impulsive        1. Safety:          - Intervention / Plan:    [x]  falls protocol     [x]  PT/OT    [x]  SP        - Results:         2. Potential DME needs:         - Intervention / Plan:  [x]  PT/OT     [x]  Assess equipment needs/access       - Results:         3.  Weakness:          - Intervention / Plan:  [x]  PT/OT      []  Other:         - Results:         4.  Discharge planning needs:          - Intervention / Plan:  [x]  Weekly team conference      [x]  family training        - Results:         5.            - Intervention / Plan:          - Results:         6.            - Intervention / Plan:         - Results:         7.            - Intervention / Plan:         - Results:           Discharge Plan   Estimated Length of Stay: 10 days    Tentative Discharge date: 12/3/24      Anticipated Discharge Destination:  Home      Team recommendations:    1. Follow up Therapy :    
will demonstrate functional cognitive-linguistic abilities in all opportunities at a ind level in order to safely complete ADLs.  Long-term Goals  Timeframe for Long-term Goals: N/A          COGNITION  OT: Cognition  Overall Cognitive Status: WFL (12/02/24 1224)  Cognition Comment: Patient midlly impulsive at times (11/25/24 1124)          Orientation  Overall Orientation Status: Within Functional Limits (12/01/24 1147)  Orientation Level: Oriented X4 (12/02/24 1224)  SP:Memory: Exceptions to WFL        Problem Solving: Exceptions to WFL    RECREATIONAL THERAPY  Attendance to recreational therapy programs:    []  Pet Therapy  [] Music Therapy  [] Art Therapy    [] Recreation Therapy Group [] Support Group           Patient social interaction (mood, participation): good, motivated    Patient strengths: independent prior    Patients goal: return home    Problems/Barriers: RLE weakness and motor control difficulties, stairs to enter home, lives alone, impulsive         1. Safety:          - Intervention / Plan:    [x]  falls protocol     [x]  PT/OT    [x]  SP        - Results:         2. Potential DME needs:         - Intervention / Plan:  [x]  PT/OT     [x]  Assess equipment needs/access       - Results:         3.  Weakness:          - Intervention / Plan:  [x]  PT/OT      []  Other:         - Results:         4.  Discharge planning needs:          - Intervention / Plan:  [x]  Weekly team conference      [x]  family training        - Results:         5.            - Intervention / Plan:          - Results:         6.            - Intervention / Plan:         - Results:         7.            - Intervention / Plan:         - Results:           Discharge Plan   Estimated Length of Stay: 10 days    Tentative Discharge date: 12/3/24      Anticipated Discharge Destination:  Home      Team recommendations:    1. Follow up Therapy :    PT  OT  SLP  RN  HH Aide    2. Home Health    Other:     Equipment needed at Discharge:

## 2024-12-04 NOTE — PROGRESS NOTES
Occupational Therapy Get up and Go Note            Date: 2024  Patient Name: Anahi Willett        MRN: [id number]    Account #: [id number]  : 1950  (74 y.o.)      Subjective:  Patient states:  \"My daughter is coming today\"  Pain:  Pain at start of treatment: No    Pain at end of treatment: Patient is feeling achy today    Location: right side   Nursing notified: Yes      Objective:  ADL:  Grooming:  Independent at wheelchair level  UE Self -Care:  Set up at wheelchair level  LE Self-Care:  Supervision for verbal cues to make sure that her right foot is solid on the floor   Toileting:  Supervision for verbal cues to make sure that her right foot is solid on the floor   Toilet transfers:  Supervision for verbal cues to make sure that her right foot is solid on the floor     Patient completed a sponge bath with supervision.     Discussed with patient that she needs to be very responsible with watching the right LE when she is going from sit to stand and that she is not ready to ambulate into the bathroom    Treatment consisted of:   [x] ADL Training  [] Strengthening   [] Transfer Training    [] DME Education  [] HEP   [] Patient Education  [] Other:    Safety:  Safety Devices  Safety Devices in place:   Type of devices: All fall risk precautions in place      Therapy Time:   Individual Group Co-Treat   Time In 30       Time Out 0746         Minutes 16             ADL/IADL trainin minutes         Electronically signed by:    BRADEN Anderson    2024, 8:16 AM  
    Hospitalist Progress Note      PCP: Fahad Mayorga DO    Date of Admission: 11/22/2024    Chief Complaint: weakness    Subjective: patient in chair     Medications:  Reviewed    Infusion Medications   Scheduled Medications    Vitamin D  2,000 Units Oral Dinner    cyanocobalamin  1,000 mcg IntraMUSCular Weekly    coenzyme Q10  100 mg Oral Daily    lidocaine  3 patch TransDERmal Daily    atorvastatin  20 mg Oral Daily    losartan  100 mg Oral Daily    melatonin  5 mg Oral Nightly    vitamin C  500 mg Oral Daily    docusate sodium  100 mg Oral BID    amLODIPine  10 mg Oral Daily    polyethylene glycol  17 g Oral Daily    levETIRAcetam  1,000 mg Oral BID     PRN Meds: acetaminophen, bisacodyl, sodium phosphate, sodium chloride flush, ondansetron **OR** [DISCONTINUED] ondansetron    No intake or output data in the 24 hours ending 11/28/24 1203    Exam:    BP (!) 149/79   Pulse 79   Temp 98.2 °F (36.8 °C) (Oral)   Resp 18   Ht 1.702 m (5' 7\")   Wt 50.5 kg (111 lb 4.8 oz)   SpO2 99%   BMI 17.43 kg/m²     General appearance: No apparent distress, appears stated age and cooperative.  HEENT: R facial hematoma Pupils equal, round, and reactive to light. Conjunctivae/corneas clear.  Neck: Supple, with full range of motion. No jugular venous distention. Trachea midline.  Respiratory:  Normal respiratory effort. Clear to auscultation, bilaterally without Rales/Wheezes/Rhonchi.  Cardiovascular: Regular rate and rhythm with normal S1/S2 without murmurs, rubs or gallops.  Abdomen: Soft, non-tender, non-distended with normal bowel sounds.  Musculoskeletal: No clubbing, cyanosis or edema bilaterally.  Full range of motion without deformity.  Skin: Skin color, texture, turgor normal.  No rashes or lesions.  Neurologic:  R foot dorsiflexion weakness   Psychiatric: Alert and oriented, thought content appropriate, normal insight  Capillary Refill: Brisk,< 3 seconds   Peripheral Pulses: +2 palpable, equal bilaterally 
    Hospitalist Progress Note      PCP: Fahad Mayorga DO    Date of Admission: 11/22/2024    Chief Complaint: weakness/R foot weakness    Subjective: patient in chair, awake/alert     Medications:  Reviewed    Infusion Medications   Scheduled Medications    acetaminophen  500 mg Oral TID    Vitamin D  2,000 Units Oral Dinner    cyanocobalamin  1,000 mcg IntraMUSCular Weekly    coenzyme Q10  100 mg Oral Daily    lidocaine  3 patch TransDERmal Daily    atorvastatin  20 mg Oral Daily    losartan  100 mg Oral Daily    melatonin  5 mg Oral Nightly    vitamin C  500 mg Oral Daily    docusate sodium  100 mg Oral BID    amLODIPine  10 mg Oral Daily    levETIRAcetam  1,000 mg Oral BID     PRN Meds: camphor-menthol-methyl salicylate, acetaminophen, bisacodyl, sodium phosphate, sodium chloride flush, ondansetron **OR** [DISCONTINUED] ondansetron    No intake or output data in the 24 hours ending 11/30/24 1013    Exam:    BP (!) 140/73   Pulse 73   Temp 97.9 °F (36.6 °C) (Oral)   Resp 18   Ht 1.702 m (5' 7\")   Wt 50.8 kg (111 lb 14.4 oz)   SpO2 96%   BMI 17.53 kg/m²     General appearance: No apparent distress, appears stated age and cooperative.  HEENT: R facial hematoma Pupils equal, round, and reactive to light. Conjunctivae/corneas clear.  Neck: Supple, with full range of motion. No jugular venous distention. Trachea midline.  Respiratory:  Normal respiratory effort. Clear to auscultation, bilaterally without Rales/Wheezes/Rhonchi.  Cardiovascular: Regular rate and rhythm with normal S1/S2 without murmurs, rubs or gallops.  Abdomen: Soft, non-tender, non-distended with normal bowel sounds.  Musculoskeletal: R foot drop No clubbing, cyanosis or edema bilaterally.  Full range of motion without deformity.  Skin: Skin color, texture, turgor normal.  No rashes or lesions.  Neurologic:  Neurovascularly intact without any focal sensory/motor deficits. Cranial nerves: II-XII intact, grossly non-focal.  Psychiatric: Alert 
    Hospitalist Progress Note      PCP: Fahad Mayorga DO    Date of Admission: 11/22/2024    Chief Complaint: weakness/foot drop    Subjective: patient in chair     Medications:  Reviewed    Infusion Medications   Scheduled Medications    acetaminophen  500 mg Oral TID    Vitamin D  2,000 Units Oral Dinner    cyanocobalamin  1,000 mcg IntraMUSCular Weekly    coenzyme Q10  100 mg Oral Daily    lidocaine  3 patch TransDERmal Daily    atorvastatin  20 mg Oral Daily    losartan  100 mg Oral Daily    melatonin  5 mg Oral Nightly    vitamin C  500 mg Oral Daily    docusate sodium  100 mg Oral BID    amLODIPine  10 mg Oral Daily    levETIRAcetam  1,000 mg Oral BID     PRN Meds: camphor-menthol-methyl salicylate, acetaminophen, bisacodyl, sodium phosphate, sodium chloride flush, ondansetron **OR** [DISCONTINUED] ondansetron      Intake/Output Summary (Last 24 hours) at 12/3/2024 1147  Last data filed at 12/2/2024 2007  Gross per 24 hour   Intake 480 ml   Output --   Net 480 ml       Exam:    BP (!) 140/70   Pulse 82   Temp 98.1 °F (36.7 °C) (Oral)   Resp 18   Ht 1.702 m (5' 7\")   Wt 52.6 kg (115 lb 15.4 oz)   SpO2 95%   BMI 18.16 kg/m²     General appearance: No apparent distress, appears stated age and cooperative.  HEENT: Pupils equal, round, and reactive to light. Conjunctivae/corneas clear.  Neck: Supple, with full range of motion. No jugular venous distention. Trachea midline.  Respiratory:  Normal respiratory effort. Clear to auscultation, bilaterally without Rales/Wheezes/Rhonchi.  Cardiovascular: Regular rate and rhythm with normal S1/S2 without murmurs, rubs or gallops.  Abdomen: Soft, non-tender, non-distended with normal bowel sounds.  Musculoskeletal: No clubbing, cyanosis or edema bilaterally.  Full range of motion without deformity.  Skin: Skin color, texture, turgor normal.  No rashes or lesions.  Neurologic:  Neurovascularly intact without any focal sensory/motor deficits. Cranial nerves: II-XII 
 Nutrition Assessment    Type and Reason for Visit:  Initial, Consult (new rehab admit)    Nutrition Recommendations/Plan:   Continue General diet as tolerated  Recommend weekly weights for monitoring     Malnutrition Assessment:  Malnutrition Status:  No malnutrition (11/26/24 1244)        Nutrition Assessment:     New admission to rehab floor. Nutritional status appears adequate at this time, based on available information. Pt denies any GI &/or Nutrition related concerns at this time. Despite low BMI, pt appears well nourished with good muscle tone for age. Counseling provided on the importance of consuming adequate calories and protein to aid in recovery with emphasis on nutrient dense food choices.  Discussed the role of proper nutrition for building & maintaining strength, to maximize participation in therapy for achieving ADL goals. .  Current diet is appropriate and tolerated,  no discharge needs identified at this time. RDN to follow up in 7-10 days per protocol.      Nutrition Related Findings:    Admitted 11/18 after fall, Dx : CVA, transfer to rehab 11/22, Hx significant for hypertension COPD. Labs unremarkable, meds reviewed, Pt appears well noursished, ate 100% of lunch Wound Type: None       Current Nutrition Intake & Therapies:    Average Meal Intake: %  Average Supplements Intake: None Ordered  ADULT DIET; Regular    Anthropometric Measures:  Height: 170.2 cm (5' 7\")  Ideal Body Weight (IBW): 135 lbs (61 kg)    Admission Body Weight: 50.3 kg (111 lb)  Current Body Weight: 50.3 kg (111 lb) (( 11/22)), 82.2 % IBW.    Current BMI (kg/m2): 17.4  Usual Body Weight: 53.1 kg (117 lb) (( 9/2023), 114# ( 7/2024), 117# ( 9/2024))     % Weight Change (Calculated): -5.1                    BMI Categories: Underweight (BMI less than 22) age over 65      Nutrition Diagnosis:   No nutrition diagnosis at this time     Nutrition Interventions:   Food and/or Nutrient Delivery: Continue Current Diet  Nutrition 
Assessment complete, VSS. Medicated with prn Tylenol for c/o right sided pain. Lidoderm patches to RUE and right shoulder. Walked with patient down up using walker, did well. Still having c/o numbness to right foot. LBM 11/27. Electronically signed by Mishel Mg RN on 11/28/24 at 10:55 AM EST   
Assessment complete, VSS. Routine Tylenol given for c/o right sided pain. Lidoderm patches applied. Patient demonstrated she is beginning to flex her right toes. Participates well in therapy and completes exercises in spare time. MEGHA 11/29. Electronically signed by Mishel Mg RN on 11/30/24 at 11:11 AM EST   
Assessment completed. A&O x4. Medicated with PRN Tylenol for 4/10 pain to RUE. Lidoderm patches applied. Pt refused bowel medications. Dr Andreas jones conrad. In chair with alarm activated. Call light in reach. Electronically signed by Ronaldo Rivas LPN on 11/29/2024 at 11:02 AM    
Endocrinology Progress Note    Assessment and Plan:   Assessment-  Thyroid nodule  CVA  ICH    Plan-  Thyroid ultrasound ordered  Thyroid labs ordered  Will make further recommendations based on those findings.  Will arrange for outpatient biopsy if warranted    POC Glucose:   Recent Labs     11/22/24 2012   POCGLU 113*     HGBA1C:  Lab Results   Component Value Date    LABA1C 5.2 11/19/2024     CBC:   No results for input(s): \"WBC\", \"HGB\", \"PLT\" in the last 72 hours.  CMP:    Lab Results   Component Value Date     11/21/2024    K 4.0 11/21/2024    CL 99 11/21/2024    CO2 28 11/21/2024    BUN 15 11/21/2024    CREATININE 0.48 (L) 11/21/2024    GLUCOSE 98 11/21/2024    CALCIUM 9.3 11/21/2024    BILITOT 0.6 11/18/2024    ALKPHOS 100 11/18/2024    AST 48 (H) 11/18/2024    ALT 31 11/18/2024    LABGLOM >90.0 11/21/2024    GLOB 2.5 11/18/2024       No results found for: \"TSH\", \"FT3\", \"T4FREE\"  No results found for: \"TPOABS\"    DATE OF EXAM: Nov 14 2023 11:31AM    EXAMINATION:  THYROID ULTRASOUND   RESULT:   Right Lobe:  4.0 x 2.0 x 1.9 cm; homogeneous echogenicity, expected   vascular flow.   Left Lobe:  3.6 x 1.8 x 1.4 cm; homogeneous echogenicity, expected   vascular flow.   Isthmus: 0.3 cm   The most suspicious thyroid nodule(s) (up to four) as below:     NODULE 1:   Location: Left lower pole   Size: 1.4 x 1.1 x 1.1 cm   Characteristics:       Composition:  Solid or almost completely solid, 2 points        Echogenicity: Hypoechoic, 2 points        Shape:  Wider-than-tall, 0 points        Margin: Smooth, 0 points        Echogenic foci (add points for all that apply):  None, 0 points        Internal vascularity:  present        Interval growth:  Stable   TI-RADS Category: TR4   ACR Recommendation: TI-RADS 4 nodule.  Follow up imaging in 1, 2, 3 and 5   years is advised.     CC: Thyroid nodule      Subjective:   Interval History: Patient is a 74-year-old female admitted to our rehabilitation unit for strengthening 
[facility]   [facility]  Speech Language Pathology  Discharge Report        Patient: Anahi Willett  : 1950    Date: 2024    Initial Status:  Diet:   Regular solids with thin liquids  Dysphagia Outcome Severity Scale:  Ratin    Speech Therapy Level of Assistance Scale:  Auditory Comprehension:  Rating: Modified Independent  Verbal Expression:  Rating:Independent-Modified Independent  Motor Speech:  Rating: Independent  Problem Solving:  Rating: Minimal Assistance  Memory:  Rating: Minimal Assistance      Long Term Goals:  Long Term Goals  Time Frame for Long Term Goals: 2-3 weeks  Goal 1: pt  will demonstrate functional cognitive-linguistic abilities in all opportunities at a ind level in order to safely complete ADLs.  Long-term Goals  Timeframe for Long-term Goals: N/A        Patient's Response to Therapy:  Patient was cooperative and pleasant. Patient presented with improvement in cognitive-linguistic skills. Per patient she had some memory deficits PTA. ST provided patient with memory strategies to improve recall at home going.      Discharge Status:  Diet:   ADULT DIET; Regular  ADULT ORAL NUTRITION SUPPLEMENT; HS Snack; Snack; Healthy HS snack --no junk food  Compensatory Swallowing Strategies : Upright as possible for all oral intake  Dysphagia Outcome Severity Scale:  Ratin    Speech Therapy Level of Assistance Scale:  Auditory Comprehension:  Rating: Modified Independent  Verbal Expression:  Rating:Independent-Modified Independent  Motor Speech:  Rating: Independent  Problem Solving:  Rating: Supervised Assistance  Memory:  Rating: Supervised Assistance        Functional Status at time of Discharge:    Cognition: Patient demonstrates minimal cognitive deficits.    Language: Patient demonstrates no language deficits.    Motor Speech: Patient demonstrates no motor speech deficits.    Swallow: Patient demonstrates no dysphagia.                             
[facility]   [facility]  Speech Language Pathology  Clinical Bedside Swallow Evaluation    NAME:Anahi Willett  : 1950 (74 y.o.)   [x]   confirmed    MRN: [id number]  ROOM: [place] [address]  ADMISSION DATE: 2024  PATIENT DIAGNOSIS(ES): IMPAIRED MOBILITY AND ADL'S D/T TRAUMATIC LEFT FRONTAL INTRACEREBRAL HEMORRHAGE  No chief complaint on file.    Patient Active Problem List    Diagnosis Date Noted    Intracranial hemorrhage (HCC) 2024    Hemorrhagic cerebrovascular accident (CVA) (HCC) 2024    Precordial chest pain 10/15/2023     Past Medical History:   Diagnosis Date    Cervical cancer (HCC)     COPD (chronic obstructive pulmonary disease) (HCC)     Hypertension      Past Surgical History:   Procedure Laterality Date    APPENDECTOMY      CARPAL TUNNEL RELEASE Left     HYSTERECTOMY (CERVIX STATUS UNKNOWN)       No Known Allergies    Date of Onset: 24  Rehab Diagnosis:  Impaired mobility and ADL's due to Traumatic left frontal intracerebral hemorrhage     Date of Evaluation: 2024   Evaluating Therapist: Jaelyn Pimentel SLP    Dysphagia Diagnosis  Dysphagia Diagnosis: Swallow function appears WFL  Dysphagia Impression : oral phase of the swallow mechanism is WFL including adequate labial and lingual strength/ROM, adequate mastication and good oral clearance. No overt s/s of aspiration was noted with all po trials. Patient presented with timely swallow, clear vocal quality and hyolaryngeal elevation was present.    Recommended Diet     Diet Solids Recommendation: Regular  Liquid Consistency Recommendation: Thin  Recommended Form of Meds: PO  Compensatory Swallowing Strategies : Upright as possible for all oral intake       Dysphagia Outcomes Severity Scale  Dysphagia Outcome Severity Scale: Level 7: Normal in all situations    Reason for Referral  Anahi Willett was referred for a bedside swallow evaluation to assess the efficiency of her 
[facility]  Facility/Department: [facility] REHAB  Speech Language Pathology   Treatment Note          Anahi Willett  1950  R261/R261-01  [x]   confirmed    Date: 2024      Restrictions/Precautions: Fall Risk     ADULT DIET; Regular  ADULT ORAL NUTRITION SUPPLEMENT; HS Snack; Snack; Healthy HS snack --no junk food     Respiratory Status:   RA  No active isolations    Rehab Diagnosis: : Impaired mobility and ADL's due to Traumatic left frontal intracerebral hemorrhage     Subjective:  Alert and Cooperative        Interventions used this date:  Cognitive Skill Development    Objective/Assessment:  Patient progressing towards goals:  Short Term Goals  Time Frame for Short Term Goals: 1-2 weeks  Goal 1: To increase independence for functional activities for home and community, patient will complete mid level executive functioning tasks (i.e. path finding, scheduling appointments, prioritizing tasks) with 90% accuracy. Patient completed high level math word problem I with 75% accuracy, with supervised assist 87% accuracy, with min cueing 100% accuracy.     Goal 2: To decrease patient's cognitive deficits through the use of compensatory strategies, the patient will be educated on 3 different memory strategies and verbalize how he/she might use them at home in 3 ways with supervised cues. Patient was educated on re-reading instructions, repeating out loud and asking other's to repeat to improve recall.    Goal 3: To address patient's cognitive deficits and promote recall of personal and medical information, the patient will answer questions addressing remote, recent, delayed recall with 90% accuracy and supervised cues. Patient completed paragraph level retention task, with 3-4 elements, I with  87% accuracy.     Patient recalled 2 step directions related to tables I with 100% accuracy.      Treatment/Activity Tolerance:  Patient tolerated treatment well    Plan:  Continue per POC    Pain 
[facility]  Facility/Department: [facility]  Speech Language Pathology   Treatment Note          Anahi Willett  1950  R261/R261-01  [x]   confirmed    Date: 2024      Restrictions/Precautions: Fall Risk     ADULT DIET; Regular     Respiratory Status:   RA  No active isolations    Rehab Diagnosis: : Impaired mobility and ADL's due to Traumatic left frontal intracerebral hemorrhage     Subjective:  Alert and Cooperative        Interventions used this date:  Cognitive Skill Development    Objective/Assessment:  Patient progressing towards goals:  Short Term Goals  Time Frame for Short Term Goals: 1-2 weeks  Goal 1: To increase independence for functional activities for home and community, patient will complete mid level executive functioning tasks (i.e. path finding, scheduling appointments, prioritizing tasks) with 90% accuracy. Patient completed portions of the FSTAC on the I pad with the following accuracy  ID bills/coins: I with 100%   ID value: I with 100%   Making 25cents: I with 80% accuracy  Medication management: I with 33% accuracy, with min cueing 66% accuracy, with supervised assist 100% accuracy    Goal 2: To decrease patient's cognitive deficits through the use of compensatory strategies, the patient will be educated on 3 different memory strategies and verbalize how he/she might use them at home in 3 ways with supervised cues. ST educated the patient on repeating out loud to improve recall.      Goal 3: To address patient's cognitive deficits and promote recall of personal and medical information, the patient will answer questions addressing remote, recent, delayed recall with 90% accuracy and supervised cues. Patient recalled 2-3 items, on the ipad, I with the following accuracy  2 picture: I with 90% accuracy  3 picture: I with 97% accuracy    Treatment/Activity Tolerance:  Patient tolerated treatment well    Plan:  Continue per POC    Pain Assessment:  Patient does not c/o 
[facility]   Facility/Department: [facility]  Speech Language Pathology  Initial Speech/Language/Cognitive Assessment    NAME:Anahi Willett  : 1950 (74 y.o.)   [x]   confirmed    MRN: [id number]  ROOM: [place] [address]  ADMISSION DATE: 2024  PATIENT DIAGNOSIS(ES): IMPAIRED MOBILITY AND ADL'S D/T TRAUMATIC LEFT FRONTAL INTRACEREBRAL HEMORRHAGE  No chief complaint on file.    Patient Active Problem List    Diagnosis Date Noted    Intracranial hemorrhage (HCC) 2024    Hemorrhagic cerebrovascular accident (CVA) (HCC) 2024    Precordial chest pain 10/15/2023     Past Medical History:   Diagnosis Date    Cervical cancer (HCC)     COPD (chronic obstructive pulmonary disease) (HCC)     Hypertension      Past Surgical History:   Procedure Laterality Date    APPENDECTOMY      CARPAL TUNNEL RELEASE Left     HYSTERECTOMY (CERVIX STATUS UNKNOWN)         Date of Onset: 24  Rehab Diagnosis:  Impaired mobility and ADL's due to Traumatic left frontal intracerebral hemorrhage     Date of Evaluation: 2024   Evaluating Therapist: HOOD Ferrera        Diagnosis: Mild cognitive-linguistic deficits were noted with immediate memory and executive function skills. Patient's verbal expression, low to mid level problem solving and sequencing skills appeared WFL. Patient and daughter deny new cognitive deficits. However, patient is agreeable to participate in ST    Requires SLP Intervention: Yes     D/C Recommendations: Ongoing speech therapy is recommended during this hospitalization    General  General Comment  Comments: Pt admitted with fall, ICH within the left frontal lobe-right hemiparesis, mainly in the lower extremity  Acute stroke   Focal seizure  Subjective  Subjective: Patient reported that PTA her daughter would assist her in reading prescription labels and medical info. Patient reported that due to her age she has to re-read things to increase 
[facility]  Facility/Department: [facility]  Speech Language Pathology   Treatment Note          Anahi Willett  1950  R261/R261-01  [x]   confirmed    Date: 2024      Restrictions/Precautions: Fall Risk     ADULT DIET; Regular  ADULT ORAL NUTRITION SUPPLEMENT; HS Snack; Snack; Healthy HS snack --no junk food     Respiratory Status:     No active isolations    Rehab Diagnosis: : Impaired mobility and ADL's due to Traumatic left frontal intracerebral hemorrhage     Subjective:  Alert, Cooperative, and Pleasant      Pt upright in wheelchair upon SLP arrival.  Pt was cooperative and pleasant throughout all therapeutic tasks.     Interventions used this date:  Cognitive Skill Development    Objective/Assessment:  Patient progressing towards goals:  Short Term Goals  Time Frame for Short Term Goals: 1-2 weeks  Goal 1: To increase independence for functional activities for home and community, patient will complete mid level executive functioning tasks (i.e. path finding, scheduling appointments, prioritizing tasks) with 90% accuracy  Pt completed executive functioning task of answering questions regarding an informative hand out (I.e banking information): 50% indp increasing to 100% given min-mod verbal cues.   Pt answered calendar questions with 60% acc indp increasing to 100% given min verbal cues.   Goal 2: To decrease patient's cognitive deficits through the use of compensatory strategies, the patient will be educated on 3 different memory strategies and verbalize how he/she might use them at home in 3 ways with supervised cues  Pt given memory strategy handout.  She shared that at home she will write down information, use association strategies to remember names, put items in consistent places, and has multiple calendars.   Goal 3: To address patient's cognitive deficits and promote recall of personal and medical information, the patient will answer questions addressing remote, recent, 
OCCUPATIONAL THERAPY  INPATIENT REHAB TREATMENT NOTE  [facility]      NAME: Anahi Willett  : 1950 (74 y.o.)  MRN: [id number]  CODE STATUS: Full Code  Room: R261/R261-01    Date of Service: 2024    Referring Physician: Trish Thacker MD  Rehab Diagnosis: Impaired mobility and ADLs d/t due to traumatic left frontal intracerebral hemorrhage    Hospital course:   Comments: Patient came to ER she has past medical history of hypertension hyperlipidemia who presents to the hospital with a fall.  Patient reports that she was walking and did not have any symptoms prior when she fell down to the ground hit her head and her right side.  She was found to have 2 cm intracranial hemorrhage within the left frontal lobe in the ER.  Neurology and neurosurgery was already contacted.  Neurosurgery seen the patient.  Patient denies loss of consciousness.  No fever or chills.  No numbness or tingling but she does have weakness in her right lower extremity.  She is not able to move her right lower extremity.  She is not able to walk.      Restrictions  Restrictions/Precautions  Restrictions/Precautions: Fall Risk     Patient's date of birth confirmed: Yes    SAFETY:  Safety Devices  Safety Devices in place: Yes  Type of devices: All fall risk precautions in place    SUBJECTIVE:  Subjective  Subjective: \"I really want to stand.\"    Pain at start of treatment: Yes: 1/10    Pain at end of treatment: Yes: 1/10    Location: back  Nursing notified: Declined  Intervention: Other: Declined    COGNITION:  Orientation  Overall Orientation Status: Within Functional Limits  Orientation Level: Oriented X4  Cognition  Overall Cognitive Status: WFL    OBJECTIVE:    Standing Tolerance  While standing at the table the pt was able to maintain a standing tolerance of 19 minutes while engaged in a game of Profilepasser. Pt was provided with min cues to identify next available moves within the game. Pt was able to complete the 
OCCUPATIONAL THERAPY  INPATIENT REHAB TREATMENT NOTE  [facility]      NAME: Anahi Willett  : 1950 (74 y.o.)  MRN: [id number]  CODE STATUS: Full Code  Room: R261/R261-01    Date of Service: 2024    Referring Physician: Trish Thacker MD  Rehab Diagnosis: Impaired mobility and ADLs d/t due to traumatic left frontal intracerebral hemorrhage    Hospital course:   Comments: Patient came to ER she has past medical history of hypertension hyperlipidemia who presents to the hospital with a fall.  Patient reports that she was walking and did not have any symptoms prior when she fell down to the ground hit her head and her right side.  She was found to have 2 cm intracranial hemorrhage within the left frontal lobe in the ER.  Neurology and neurosurgery was already contacted.  Neurosurgery seen the patient.  Patient denies loss of consciousness.  No fever or chills.  No numbness or tingling but she does have weakness in her right lower extremity.  She is not able to move her right lower extremity.  She is not able to walk.      Restrictions  Restrictions/Precautions  Restrictions/Precautions: Fall Risk                 Patient's date of birth confirmed: Yes    SAFETY:  Safety Devices  Safety Devices in place: Yes  Type of devices: All fall risk precautions in place    SUBJECTIVE:  Subjective  Subjective: \"I don't want to feel rushed so I won't wash my hair\"    Pain at start of treatment: Yes: 4/10    Pain at end of treatment: Yes: 4/10    Location: R shoulder  Description: ache  Nursing notified: Declined  Intervention: Repositioned    COGNITION:  Orientation  Overall Orientation Status: Within Functional Limits  Orientation Level: Oriented X4  Cognition  Overall Cognitive Status: WFL      OBJECTIVE:     Full ADL completed at shower level on this date. Pt would likely benefit from increased ADL session time or break after ADL session to allow time to blow dry hair as pt felt rushed 
OCCUPATIONAL THERAPY  INPATIENT REHAB TREATMENT NOTE  [facility]      NAME: Anahi Willett  : 1950 (74 y.o.)  MRN: [id number]  CODE STATUS: Full Code  Room: R261/R261-01    Date of Service: 2024    Referring Physician: Trish Thacker MD  Rehab Diagnosis: Impaired mobility and ADLs d/t due to traumatic left frontal intracerebral hemorrhage    Hospital course:   Comments: Patient came to ER she has past medical history of hypertension hyperlipidemia who presents to the hospital with a fall.  Patient reports that she was walking and did not have any symptoms prior when she fell down to the ground hit her head and her right side.  She was found to have 2 cm intracranial hemorrhage within the left frontal lobe in the ER.  Neurology and neurosurgery was already contacted.  Neurosurgery seen the patient.  Patient denies loss of consciousness.  No fever or chills.  No numbness or tingling but she does have weakness in her right lower extremity.  She is not able to move her right lower extremity.  She is not able to walk.      Restrictions  Restrictions/Precautions  Restrictions/Precautions: Fall Risk                 Patient's date of birth confirmed: Yes    SAFETY:  Safety Devices  Safety Devices in place: Yes  Type of devices: All fall risk precautions in place    SUBJECTIVE: \"I have no worries about going home.\"        Pain at start of treatment: No    Pain at end of treatment: No    Location: N/A  Description: N/A    COGNITION:  Orientation  Overall Orientation Status: Within Functional Limits  Orientation Level: Oriented X4  Cognition  Overall Cognitive Status: WFL    OBJECTIVE:  Pt reports no concerns with going home. Pt stated she addressed all areas she was nervous about and feels comfortable with \"everything else.\" Pt with no further questions regarding bathroom AE or transfers.    UE STRENGTHENING  Pt instructed on and performed an UE strengthening HEP for improved safety and 
OCCUPATIONAL THERAPY  INPATIENT REHAB TREATMENT NOTE  [facility]      NAME: Anahi Willett  : 1950 (74 y.o.)  MRN: [id number]  CODE STATUS: Full Code  Room: R261/R261-01    Date of Service: 2024    Referring Physician: Trish Thacker MD  Rehab Diagnosis: Impaired mobility and ADLs d/t due to traumatic left frontal intracerebral hemorrhage    Hospital course:   Comments: Patient came to ER she has past medical history of hypertension hyperlipidemia who presents to the hospital with a fall.  Patient reports that she was walking and did not have any symptoms prior when she fell down to the ground hit her head and her right side.  She was found to have 2 cm intracranial hemorrhage within the left frontal lobe in the ER.  Neurology and neurosurgery was already contacted.  Neurosurgery seen the patient.  Patient denies loss of consciousness.  No fever or chills.  No numbness or tingling but she does have weakness in her right lower extremity.  She is not able to move her right lower extremity.  She is not able to walk.      Restrictions  Restrictions/Precautions  Restrictions/Precautions: Fall Risk                 Patient's date of birth confirmed: Yes    SAFETY:  Safety Devices  Safety Devices in place: Yes  Type of devices: All fall risk precautions in place    SUBJECTIVE:  Subjective  Subjective: 'I would like to be able to sit in a regular chair with the walker in front of me\" patient reported. Patient was educated on decreased safety in ambulation at this time due to decreased stability of the right ankle and LE and how it would be a safety issue for her to walk without therapeutic intervention at this time. Patient reported an understanding    Pain at start of treatment: No    Pain at end of treatment: Yes: 4/10    Location: right arm  Description: soreness  Nursing notified: Yes  Nurse: Anu  Intervention: Other: RN placed a pain patch after the 
OCCUPATIONAL THERAPY  INPATIENT REHAB TREATMENT NOTE  [facility]      NAME: Anahi Willett  : 1950 (74 y.o.)  MRN: [id number]  CODE STATUS: Full Code  Room: R261/R261-01    Date of Service: 2024    Referring Physician: Trish Thacker MD  Rehab Diagnosis: Impaired mobility and ADLs d/t due to traumatic left frontal intracerebral hemorrhage    Hospital course:   Comments: Patient came to ER she has past medical history of hypertension hyperlipidemia who presents to the hospital with a fall.  Patient reports that she was walking and did not have any symptoms prior when she fell down to the ground hit her head and her right side.  She was found to have 2 cm intracranial hemorrhage within the left frontal lobe in the ER.  Neurology and neurosurgery was already contacted.  Neurosurgery seen the patient.  Patient denies loss of consciousness.  No fever or chills.  No numbness or tingling but she does have weakness in her right lower extremity.  She is not able to move her right lower extremity.  She is not able to walk.      Restrictions  Restrictions/Precautions  Restrictions/Precautions: Fall Risk                 Patient's date of birth confirmed: Yes    SAFETY:  Safety Devices  Safety Devices in place: Yes  Type of devices: All fall risk precautions in place    SUBJECTIVE:  Subjective  Subjective: \"I need to get more weight on that leg\"    Pain at start of treatment: No    Pain at end of treatment: No      COGNITION:     WFL for the tasks this session    OBJECTIVE:     Patient placed 100 pop beads onto the mushroom pegboard using her right hand and completing them one at a time. Following a short rest patient used a 6 pound resistive clothespin to  the pop beads and place them into a container. She was able to  63 of them with the 6 pound resistive clothes pin before reporting that it was hurting her right thumb and wrist as well as her shoulder. Six pound resistive 
OCCUPATIONAL THERAPY  INPATIENT REHAB TREATMENT NOTE  [facility]      NAME: Anahi Willett  : 1950 (74 y.o.)  MRN: [id number]  CODE STATUS: Full Code  Room: R261/R261-01    Date of Service: 2024    Referring Physician: Trish Thacker MD  Rehab Diagnosis: Impaired mobility and ADLs d/t due to traumatic left frontal intracerebral hemorrhage    Hospital course:   Comments: Patient came to ER she has past medical history of hypertension hyperlipidemia who presents to the hospital with a fall.  Patient reports that she was walking and did not have any symptoms prior when she fell down to the ground hit her head and her right side.  She was found to have 2 cm intracranial hemorrhage within the left frontal lobe in the ER.  Neurology and neurosurgery was already contacted.  Neurosurgery seen the patient.  Patient denies loss of consciousness.  No fever or chills.  No numbness or tingling but she does have weakness in her right lower extremity.  She is not able to move her right lower extremity.  She is not able to walk.      Restrictions  Restrictions/Precautions  Restrictions/Precautions: Fall Risk                 Patient's date of birth confirmed: Yes    SAFETY:  Safety Devices  Safety Devices in place: Yes  Type of devices: All fall risk precautions in place    SUBJECTIVE:  Subjective  Subjective: \"I have a question for you. I noticed that my right leg is colder than the left one so I was wondering if heat would help it\" patient was educated about sensory nerves and motor nerves and that potentially her sensory nerves are sending different messages to the brain or the brain is perceiving them differently.    Pain at start of treatment: No    Pain at end of treatment: No      COGNITION:     Good for the session    OBJECTIVE:     Grooming/Oral Hygiene  Assistance Level: Modified independent  Skilled Clinical Factors: wheelchair level to wash her hands  Toileting  Assistance Level: 
OCCUPATIONAL THERAPY  INPATIENT REHAB TREATMENT NOTE  [facility]      NAME: Anahi Willett  : 1950 (74 y.o.)  MRN: [id number]  CODE STATUS: Full Code  Room: R261/R261-01    Date of Service: 12/3/2024    Referring Physician: Trish Thacker MD  Rehab Diagnosis: Impaired mobility and ADLs d/t due to traumatic left frontal intracerebral hemorrhage    Hospital course:   Comments: Patient came to ER she has past medical history of hypertension hyperlipidemia who presents to the hospital with a fall.  Patient reports that she was walking and did not have any symptoms prior when she fell down to the ground hit her head and her right side.  She was found to have 2 cm intracranial hemorrhage within the left frontal lobe in the ER.  Neurology and neurosurgery was already contacted.  Neurosurgery seen the patient.  Patient denies loss of consciousness.  No fever or chills.  No numbness or tingling but she does have weakness in her right lower extremity.  She is not able to move her right lower extremity.  She is not able to walk.      Restrictions  Restrictions/Precautions  Restrictions/Precautions: None                 Patient's date of birth confirmed: Yes    SAFETY:  Safety Devices  Safety Devices in place: Not Applicable    SUBJECTIVE:  Subjective  Subjective: \"This is just what I will be doing at home\" patient reported when she was vacuuming    Pain at start of treatment: No    Pain at end of treatment: No      COGNITION:  Orientation  Orientation Level: Oriented X4  Cognition  Overall Cognitive Status: WFL    OBJECTIVE:    Light Housekeeping  Light Housekeeping Level:  (no device)  Light Housekeeping Level of Assistance: Modified independent  Light Housekeeping: Patient was able to vacuum using a smaller sized vacuum including under a desk and moving a box fan to vacuum in those areas    Functional Mobility  Device: Rolling walker  Activity: To/From therapy gym  Assistance Level: Modified 
OCCUPATIONAL THERAPY  INPATIENT REHAB TREATMENT NOTE  [facility]      NAME: Anahi Willett  : 1950 (74 y.o.)  MRN: [id number]  CODE STATUS: Full Code  Room: R261/R261-01    Date of Service: 2024    Referring Physician: Trish Thacker MD  Rehab Diagnosis: Impaired mobility and ADLs d/t due to traumatic left frontal intracerebral hemorrhage    Hospital course:   Comments: Patient came to ER she has past medical history of hypertension hyperlipidemia who presents to the hospital with a fall.  Patient reports that she was walking and did not have any symptoms prior when she fell down to the ground hit her head and her right side.  She was found to have 2 cm intracranial hemorrhage within the left frontal lobe in the ER.  Neurology and neurosurgery was already contacted.  Neurosurgery seen the patient.  Patient denies loss of consciousness.  No fever or chills.  No numbness or tingling but she does have weakness in her right lower extremity.  She is not able to move her right lower extremity.  She is not able to walk.      Restrictions  Restrictions/Precautions  Restrictions/Precautions: Fall Risk                 Patient's date of birth confirmed: Yes    SAFETY:  Safety Devices  Safety Devices in place: Yes  Type of devices: All fall risk precautions in place    SUBJECTIVE:  Subjective  Subjective: \"I will need to practice that more\" patient reported about the tub transfer    Pain at start of treatment: No    Pain at end of treatment: No    Patient reported that she has some aching in her right shoulder but no pain    COGNITION:   WFL    OBJECTIVE:  Tub/Shower Transfers  Type: Tub  Transfer From: Rolling walker  Transfer To: Shower chair with back  Assistance Level: Minimal assistance  Skilled Clinical Factors: Patient was educated in new technique to sit and swing her legs into the tub so that she is not putting all of her weight on her right then left feet and potentially 
OCCUPATIONAL THERAPY  INPATIENT REHAB TREATMENT NOTE  [facility]      NAME: Anahi Willett  : 1950 (74 y.o.)  MRN: [id number]  CODE STATUS: Full Code  Room: R261/R261-01    Date of Service: 2024    Referring Physician: Trish Thacker MD  Rehab Diagnosis: Impaired mobility and ADLs d/t due to traumatic left frontal intracerebral hemorrhage    Hospital course:   Comments: Patient came to ER she has past medical history of hypertension hyperlipidemia who presents to the hospital with a fall.  Patient reports that she was walking and did not have any symptoms prior when she fell down to the ground hit her head and her right side.  She was found to have 2 cm intracranial hemorrhage within the left frontal lobe in the ER.  Neurology and neurosurgery was already contacted.  Neurosurgery seen the patient.  Patient denies loss of consciousness.  No fever or chills.  No numbness or tingling but she does have weakness in her right lower extremity.  She is not able to move her right lower extremity.  She is not able to walk.      Restrictions  Restrictions/Precautions  Restrictions/Precautions: Fall Risk                 Patient's date of birth confirmed: Yes    SAFETY:  Safety Devices  Safety Devices in place: Yes  Type of devices: All fall risk precautions in place    SUBJECTIVE:       Pain at start of treatment: No    Pain at end of treatment: No    Location: pt states her RUE \"aches\" but is not technically painful.    COGNITION:  Orientation  Overall Orientation Status: Within Functional Limits  Orientation Level: Oriented X4  Cognition  Overall Cognitive Status: WFL      OBJECTIVE:       Grooming/Oral Hygiene  Assistance Level: Modified independent  Skilled Clinical Factors: wheelchair level  Toileting  Assistance Level: Supervision  Skilled Clinical Factors: seated toilet hygiene and standing pant management  Toilet Transfers  Technique: Stand step  Equipment: Standard 
OCCUPATIONAL THERAPY  INPATIENT REHAB TREATMENT NOTE  [facility]      NAME: Anahi Willett  : 1950 (74 y.o.)  MRN: [id number]  CODE STATUS: Full Code  Room: R261/R261-01    Date of Service: 2024    Referring Physician: Trish Thacker MD  Rehab Diagnosis: Impaired mobility and ADLs d/t due to traumatic left frontal intracerebral hemorrhage    Hospital course:   Comments: Patient came to ER she has past medical history of hypertension hyperlipidemia who presents to the hospital with a fall.  Patient reports that she was walking and did not have any symptoms prior when she fell down to the ground hit her head and her right side.  She was found to have 2 cm intracranial hemorrhage within the left frontal lobe in the ER.  Neurology and neurosurgery was already contacted.  Neurosurgery seen the patient.  Patient denies loss of consciousness.  No fever or chills.  No numbness or tingling but she does have weakness in her right lower extremity.  She is not able to move her right lower extremity.  She is not able to walk.      Restrictions  Restrictions/Precautions  Restrictions/Precautions: Fall Risk     Patient's date of birth confirmed: Yes    SAFETY:  Safety Devices  Safety Devices in place: Yes  Type of devices: All fall risk precautions in place      SUBJECTIVE: \"I wanna' do stuff I can't do.\"     Pain   Pain at start of treatment: No    Pain at end of treatment: No        OBJECTIVE:    Transfers  Sit to Stand  Assistance Level: Modified independent  Stand to Sit  Assistance Level: Modified independent    Tub/Shower Transfers  Type: Tub  Transfer From: Walker (FWW)  Transfer To: Shower chair with back  Additional Factors: With handrails  Assistance Level: Supervision  Skilled Clinical Factors: Lateral side step into tub, Pt swung legs over tub to come out of tub    Instrumental ADL's  Pet Care  Pet Care Level: Walker  Pet Care Level of Assistance: Supervision (Close 
Patient alert  and oriented pleasant and cooperative denies any pain or discomfort at this time. Ambulates to bathroom with walker good safety awareness continent of bowl and bladder.  
Patient alert and oriented one person pivot transfer Room door closed per patient request to promote rest call light within reach patient calls for assistance.  
Patient requesting to be \"independent\" in the room. Wants to walk around freely and states she feels like \"sitting in the wheelchair does not do any good\". Therapy made aware. Patient has been found walking around in room and in bathroom. Educated patient on safety concerns and use of call light if she wants to take a break from sitting in wheelchair. Aware that she must wait until she is deemed independent by therapy to walk in room without assistance. Alarms activated. Electronically signed by Mishel Mg RN on 12/1/24 at 1:49 PM EST   
Physical Therapy  Facility/Department: [facility]  Rehabilitation Discharge note    NAME: Anahi Willett  : 1950  MRN: [id number]    Date of discharge: 12/3/24      Past Medical History:   Diagnosis Date    Cervical cancer (HCC)     COPD (chronic obstructive pulmonary disease) (HCC)     Hypertension     Osteoarthritis      Past Surgical History:   Procedure Laterality Date    APPENDECTOMY      CARPAL TUNNEL RELEASE Left     HYSTERECTOMY (CERVIX STATUS UNKNOWN)         Restrictions  Restrictions/Precautions  Restrictions/Precautions: None    Objective      Roll Left  Assistance Level: Independent  Roll Right  Assistance Level: Independent  Sit to Supine  Assistance Level: Independent  Supine to Sit  Assistance Level: Independent  Scooting  Assistance Level: Independent     Sit to Stand  Assistance Level: Independent  Stand to Sit  Assistance Level: Independent  Bed To/From Chair  Technique: Stand step  Assistance Level: Independent  Car Transfer  Assistance Level: Independent     Ambulation  Surface: Level surface;Uneven surface;Carpet  Device:  (No AD)  Distance: 300'  Activity: Within Unit  Activity Comments: Reciprocal pattern intermitent decreased R foot clearance  Additional Factors: Verbal cues  Assistance Level: Independent  Gait Deviations: Slow salinas;Decreased heel strike right;Narrow base of support     Stairs  Stair Height: 6''  Device: Bilateral handrails  Number of Stairs: 12  Additional Factors: Verbal cues;Reciprocal going up;Non-reciprocal going down  Assistance Level: Modified independent        Outcomes Measures:  Cruz Balance Score: 50       Pt/ family education/training: Pt has been educated in safe mobility. She demonstrates good follow thru with mild concerns to due reactivity of RLE in gait.    Assessment:  Pt has made excellent gains. She has met goals as listed    LTG established:  Long Term Goal 1: indep bed mobility- Met  Long Term Goal 2: indep sit to stand, bed and car 
Physical Therapy  Physical Therapy Rehab Treatment Note  Facility/Department[facility]  [facility]       NAME: Anahi Willett  : 1950 (74 y.o.)  MRN: [id number]  CODE STATUS: Full Code    Date of Service: 2024       Restrictions:  Restrictions/Precautions: Fall Risk       SUBJECTIVE:    No new reports     Pain   0/10      OBJECTIVE:     Bed mobility  Rolling to Left: Stand by assistance  Rolling to Right: Stand by assistance  Supine to Sit: Stand by assistance  Sit to Supine: Stand by assistance  Scooting: Stand by assistance  Bed Mobility Comments: mat mobility PM , self assisting RLE    Transfers  Sit to Stand: Minimal Assistance  Bed to Chair: Minimal assistance    Ambulation  Surface: Level tile  Device: Rolling Walker  Other Apparatus: Dorsiflex Assist;Right  Assistance: Minimal assistance  Quality of Gait: increased WS vc to increase reciprocial gait pattern with slight increase in hip circumduction.  Gait Deviations: Decreased step length;Decreased step height  Distance: 40  Ambulation 2  Surface - 2: level tile  Device 2: Rolling Walker  Assistance 2: Minimal assistance  Distance: 40 feet    Stairs/Curb  Stairs?: Yes  Stairs  # Steps : 4  Stairs Height: 6\"  Rails: Bilateral  Assistance: Moderate assistance;Minimal assistance  Comment: mod assist down to clear LE.      PT Exercises  Exercise Treatment: LAQ/Marching - x10 with (AAROM on R to facilitate movement)  A/AROM Exercises: seated  attempted quads sets, supine qs heel slides on R .  Motor Control/Coordination: Rolling ball on floor with Min A for controlled movement for rt motor planning           ASSESSMENT/PROGRESS TOWARDS GOALS:   Body Structures, Functions, Activity Limitations Requiring Skilled Therapeutic Intervention: Decreased functional mobility ;Decreased ROM;Decreased strength;Decreased safe awareness;Decreased cognition;Decreased endurance;Decreased balance;Decreased coordination  Assessment: Pt with improved 
Physical Therapy Rehab Treatment Note  [facility]  [facility]       NAME: Anahi Willett  : 1950 (74 y.o.)  MRN: [id number]  CODE STATUS: Full Code    Date of Service: 2024     Restrictions:  Restrictions/Precautions: Fall Risk     SUBJECTIVE:   Subjective: \" I am moving my toes\"    Pain  Pain: Denies pain pre and post session      OBJECTIVE:     Sit to Stand  Assistance Level: Modified independent  Stand to Sit  Assistance Level: Modified independent    Ambulation  Surface: Level surface  Device:  (No AD)  Distance: 300'  Activity: Within Unit  Activity Comments: Reciprocal pattern intermitent decreased R foot clearance  Assistance Level: Supervision    Stairs  Stair Height: 6''  Device: Bilateral handrails  Number of Stairs: 12  Additional Factors: Verbal cues;Reciprocal going up;Non-reciprocal going down  Assistance Level: Supervision  Skilled Clinical Factors: Circumducts to clear RLE compelted    PT Exercises  Exercise Treatment: Standing exercise:Heel raise, R knee flexion Therapist places in knee flexion patient holds against gravity x 5     ASSESSMENT/PROGRESS TOWARDS GOALS:   Assessment  Assessment: Therapy session focused on continuing gait without AD. Patient continues to exhibit decreased R foot clearance with swing phase. Patient exhibits minimal activation of R DF this session.  Activity Tolerance: Patient tolerated treatment well  Discharge Recommendations: Continue to assess pending progress    Goals:  Long Term Goals  Long Term Goal 1: indep bed mobility  Long Term Goal 2: indep sit to stand, bed and car transfers  Long Term Goal 3: indep gait with or without devices 50 feet with good judgement -  feet  Long Term Goal 4: pt to complete brasher at 25/56 level  Long Term Goal 5: Pt to complete 4 stairs with rail with SBA  Patient Goals   Patient Goals : to return to home not needing help    PLAN OF CARE/Safety:   Safety Devices  Type of Devices: 
Physical Therapy Rehab Treatment Note  Facility/Department[facility] [place] [facility]  [facility]       NAME: Anahi Willett  : 1950 (74 y.o.)  MRN: [id number]  CODE STATUS: Full Code    Date of Service: 2024     Restrictions:  Restrictions/Precautions: Fall Risk    SUBJECTIVE:   Subjective: \" I want to get this leg stronger\"    Pain  Pain: Denies pain pre and post session    OBJECTIVE:     Sit to Stand  Assistance Level: Supervision  Stand to Sit  Assistance Level: Stand by assist  Skilled Clinical Factors: Cues to step back completely to chair prior to sitting  Bed To/From Chair  Technique: Stand step  Assistance Level: Stand by assist    Ambulation  Surface: Level surface;Uneven surface;Carpet  Device: Rolling walker  Distance: 125'  Activity: Within Unit  Activity Comments: Reciprocal pattern, Decreased R foot clearance. Improved R foot placement with negotiation of turns. Without DF wrap  Additional Factors: Verbal cues  Assistance Level: Minimal assistance  Gait Deviations: Slow salinas;Decreased heel strike right;Narrow base of support    Stairs  Stair Height: 6''  Device: Bilateral handrails  Number of Stairs: 4  Additional Factors: Verbal cues;Reciprocal going up;Non-reciprocal going down  Assistance Level: Minimal assistance  Skilled Clinical Factors: Difficulty clearing RLE ascending stairs. Increased time to step down with RLE descending stairs    Neuromuscular Education  Neuromuscular Education: Yes  NDT Treatment: Gait   Neuromuscular Comments: Gait drills in // bars lateral stepping to L/R forwards and retro walking length of // bars x 4 each    Activity Tolerance  Activity Tolerance: Patient tolerated treatment well    ASSESSMENT/PROGRESS TOWARDS GOALS:   Assessment  Assessment: Patient continues to progress safety with gait improving R foot clearance and maintaining RLE insode of walker. Good safety noted with STS transfers with improved hand placment with stand to sit 
Physical Therapy Rehab Treatment Note  Facility/Department[facility] [place] [facility]  [facility]       NAME: Anahi Willett  : 1950 (74 y.o.)  MRN: [id number]  CODE STATUS: Full Code    Date of Service: 2024     Restrictions:  Restrictions/Precautions: Fall Risk    SUBJECTIVE:   Subjective: \" I am doing good\"    Pain  Pain: Denies pain pre and post session    OBJECTIVE:     Sit to Supine  Assistance Level: Independent  Supine to Sit  Assistance Level: Independent  Scooting  Assistance Level: Independent    Sit to Stand  Assistance Level: Independent  Stand to Sit  Assistance Level: Independent  Bed To/From Chair  Technique: Stand step  Assistance Level: Independent  Floor Transfer  Assistance Level: Modified independent  Skilled Clinical Factors: Improved technique/ safety    PT Exercises  Exercise Treatment: Supine exercise: Quad sets, glute sets, Heel slides, SLR, Hip abduction, Bridges, Oblique crunches, crunches x 15 each     Activity Tolerance  Activity Tolerance: Patient tolerated treatment well    Education Provided: Home Exercise Program  Education  Education Given To: Patient  Education Provided: Home Exercise Program  Education Provided Comments: Supine HEP given and reviewed with handout provided. Patient completes with [place]  Education Method: Printed Information/Hand-outs  Barriers to Learning: None  Education Outcome: Demonstrated understanding        ASSESSMENT/PROGRESS TOWARDS GOALS:   Assessment  Assessment: Therapy session focused on floor transfers and completing supine HEP. Patient shows a good understanding of HEP completing with independence  Activity Tolerance: Patient tolerated treatment well  Discharge Recommendations: Continue to assess pending progress    Goals:  Long Term Goals  Long Term Goal 1: indep bed mobility- Met  Long Term Goal 2: indep sit to stand, bed and car transfers- Met  Long Term Goal 3: indep gait with or without devices 50 feet with good judgement - 
Physical Therapy Rehab Treatment Note  [facility]Department[facility] [place] [facility]  [facility]       NAME: Anahi Willett  : 1950 (74 y.o.)  MRN: [id number]  CODE STATUS: Full Code    Date of Service: 2024     Restrictions:  Restrictions/Precautions: Fall Risk    SUBJECTIVE:   Subjective: \" I am doing alright\"    Pain  Pain: Denies pain pre and post session    OBJECTIVE:     Roll Left  Assistance Level: Stand by assist  Roll Right  Assistance Level: Stand by assist  Sit to Supine  Assistance Level: Stand by assist  Supine to Sit  Assistance Level: Stand by assist  Scooting  Assistance Level: Stand by assist    Sit to Stand  Assistance Level: Stand by assist  Stand to Sit  Assistance Level: Stand by assist;Minimal assistance  Skilled Clinical Factors: Zoraida post gait due to sitting prior to backing up to   Bed To/From Chair  Assistance Level: Stand by assist    Ambulation  Surface: Level surface;Uneven surface;Carpet  Device: Rolling walker  Distance: 50' (25' with ww, 25' with therapist assist)  Activity: Within Unit  Activity Comments: Reciprocal pattern, Decreased R foot clearance. Inconsistent R foot placement. Difficulty keeping RLE inside of walker with neogitation of R hand turns. No LOB  Additional Factors: Verbal cues  Assistance Level: Minimal assistance; Moderate assistance  Gait Deviations: Slow salinas;Decreased heel strike right;Narrow base of support  Skilled Clinical Factors: Cues for maintaning RLE inside walker with negotiation of turns    PT Exercises  Exercise Treatment: Supine exercise: Oblique crunches, crunches, Leg press, Heel slidesx 10 each     Activity Tolerance  Activity Tolerance: Patient tolerated treatment well    ASSESSMENT/PROGRESS TOWARDS GOALS:   Assessment  Assessment: Therapy session focused on improved trunk activation for improved advancement of RLE during swing phase of gait. Patient continues to progress safety with transfers backing up completely prior to 
Physical Therapy Rehab Treatment Note  [place] [facility] [place]  [facility]       NAME: Anahi Willett  : 1950 (74 y.o.)  MRN: [id number]  CODE STATUS: Full Code    Date of Service: 2024     Restrictions:  Restrictions/Precautions: Fall Risk     SUBJECTIVE:   Subjective: \" I want to try the floor transfer\"    Pain  Pain: Denies pain pre and post session    OBJECTIVE:     Sit to Stand  Assistance Level: Modified independent  Stand to Sit  Assistance Level: Stand by assist  Skilled Clinical Factors: Cues to step back completely and reach back to chair prior to sitting  Bed To/From Chair  Technique: Stand step  Assistance Level: Supervision  Skilled Clinical Factors: Requires use of BUE to complete  Floor Transfer  Assistance Level: Stand by assist  Skilled Clinical Factors: Cues for proper technique. Completed x 3 for ease of completion    Ambulation  Surface: Level surface  Device: Rolling walker  Distance: 130'  Activity: Within Unit  Activity Comments: Completes without DF wrap cues for increased hip flexion to clear RLE. Intermittent stepping outside walker with negotiation of turns  Additional Factors: Verbal cues  Assistance Level: Stand by assist  Gait Deviations: Slow salinas;Decreased heel strike right;Narrow base of support    Activity Tolerance  Activity Tolerance: Patient tolerated treatment well    ASSESSMENT/PROGRESS TOWARDS GOALS:   Assessment  Assessment: Initiated floor transfer this session iwth patient completing with SBA. Multiple reps completed for improved technique/ safety. Patient continues to exhibit maintaining RLE inside walker with negotiation of R hand tuns  Activity Tolerance: Patient tolerated treatment well  Discharge Recommendations: Continue to assess pending progress    Goals:  Long Term Goals  Long Term Goal 1: indep bed mobility  Long Term Goal 2: indep sit to stand, bed and car transfers  Long Term Goal 3: indep gait with or without devices 50 
Physical Therapy Rehab Treatment Note  [place] [facility] [place] [facility]  [facility]       NAME: Anahi Willett  : 1950 (74 y.o.)  MRN: [id number]  CODE STATUS: Full Code    Date of Service: 12/3/2024     Restrictions:  Restrictions/Precautions: Fall Risk    SUBJECTIVE:   Subjective: \" I am ready to go home\"    Pain  Pain: Denies pain pre and post session    OBJECTIVE:     Sit to Stand  Assistance Level: Independent  Stand to Sit  Assistance Level: Independent    Ambulation  Surface: Level surface  Device:  (Without AD)  Distance: 300'  Activity: Within Unit  Activity Comments: Reciprocal pattern intermitent decreased R foot clearance  Assistance Level: Independent  Gait Deviations: Slow salinas;Decreased heel strike right;Narrow base of support    PT Exercises  Exercise Treatment: Standing exercise: Hip flexion Hip abduction, Hip extension, Knee flexion, Heel raises, Mini squats x 15 each     Education Provided: Home Exercise Program  Education  Education Given To: Patient  Education Provided: Home Exercise Program  Education Provided Comments: Standing HEP given and reviewed with handout provided. Patient completes with independence  Education Method: Printed Information/Hand-outs  Education Outcome: Demonstrated understanding      ASSESSMENT/PROGRESS TOWARDS GOALS: \  Assessment  Assessment: Patient continues to meet all goals. Good safety with all mobility. Standing HEP completed with [place]  Activity Tolerance: Patient tolerated treatment well  Discharge Recommendations: Continue to assess pending progress    Goals:  Long Term Goals  Long Term Goal 1: indep bed mobility- Met  Long Term Goal 2: indep sit to stand, bed and car transfers- Met  Long Term Goal 3: indep gait with or without devices 50 feet with good judgement -  feet-- Met Independent 150'  Long Term Goal 4: pt to complete brasher at 25/56 level- Met 50/56  Long Term Goal 5: Pt to complete 4 stairs with rail with SBA- 
Physical Therapy Rehab Treatment Note  Facility/Department[facility] [place] [facility]  [facility]       NAME: Anahi Willett  : 1950 (74 y.o.)  MRN: [id number]  CODE STATUS: Full Code    Date of Service: 2024     Restrictions:  Restrictions/Precautions: Fall Risk    SUBJECTIVE:   Subjective: \" I had to have a test on my thyroid\"    Pain  Pain: Denies pain pre and post session    OBJECTIVE:      Outcomes Measures:  Centeno Balance Score: 38      Roll Left  Assistance Level: Supervision  Roll Right  Assistance Level: Supervision  Sit to Supine  Assistance Level: Supervision  Supine to Sit  Assistance Level: Supervision  Scooting  Assistance Level: Supervision    Sit to Stand  Assistance Level: Supervision  Stand to Sit  Assistance Level: Stand by assist  Skilled Clinical Factors: Cues to step back completely to chaitr prior to sitting  Bed To/From Chair  Technique: Stand step  Assistance Level: Stand by assist    Ambulation  Surface: Level surface;Uneven surface;Carpet  Device: Rolling walker  Distance: 125'  Activity: Within Unit  Activity Comments: Reciprocal pattern, Decreased R foot clearance. Improved R foot placement with negotiation of turns. With R DF wrap. Completed 50' without DF wrap with intermittent unable to advance RLE. Cues for improved foot clearance  Additional Factors: Verbal cues  Assistance Level: Minimal assistance  Gait Deviations: Slow salinas;Decreased heel strike right;Narrow base of support    PT Exercises  Exercise Treatment: Supine exercise: Oblique crunches, crunches, Leg press, Heel slidesx 15 each     ASSESSMENT/PROGRESS TOWARDS GOALS:   Assessment  Assessment: Initiated CENTENO scoring 38/56. Patient continues to progress activation of trunk and RLE for improved safety with gait and transfers.  Activity Tolerance: Patient tolerated treatment well  Discharge Recommendations: Continue to assess pending progress    Goals:  Long Term Goals  Long Term Goal 1: indep bed 
Physical Therapy Rehab Treatment Note  Facility/Department: [facility] [place] [facility]  [facility]       NAME: Anahi Willett  : 1950 (74 y.o.)  MRN: [id number]  CODE STATUS: Full Code    Date of Service: 2024     Restrictions:  Restrictions/Precautions: Fall Risk    SUBJECTIVE:   Subjective: \" I am doing pretty good\"    Pain  Pain: Denies pain pre and post session    OBJECTIVE:     Roll Left  Assistance Level: Modified independent  Roll Right  Assistance Level: Modified independent  Sit to Supine  Assistance Level: Modified independent  Supine to Sit  Assistance Level: Modified independent  Scooting  Assistance Level: Modified independent    Sit to Stand  Assistance Level: Modified independent  Stand to Sit  Assistance Level: Stand by assist  Skilled Clinical Factors: Cues to step back completely and reach back to chair prior to sitting  Bed To/From Chair  Technique: Stand step  Assistance Level: Supervision  Skilled Clinical Factors: Requires use of BUE to complete  Car Transfer  Assistance Level: Supervision  Skilled Clinical Factors: Cues for technique/ safety    Ambulation  Surface: Level surface  Device: Rolling walker  Distance: 80' x 2  Activity: Within Unit  Activity Comments: Completed without and with DF wrap. Improved foot clearance with DF wrap  Additional Factors: Verbal cues  Assistance Level: Stand by assist  Gait Deviations: Slow salinas;Decreased heel strike right;Narrow base of support    Stairs  Stair Height: 6''  Device: Bilateral handrails  Number of Stairs: 4 x 2  Additional Factors: Verbal cues;Reciprocal going up;Non-reciprocal going down  Assistance Level: Minimal assistance  Skilled Clinical Factors: Difficulty clearing RLE ascending stairs. Increased time to step down with RLE descending stairs    Education Provided: Family Education  Education  Education Given To: Family  Education Provided: Family Education  Education Provided Comments: Family training completed with 
Physical Therapy Rehab Treatment Note  [place] [place] [facility]  [facility]       NAME: Anahi Willett  : 1950 (74 y.o.)  MRN: [id number]  CODE STATUS: Full Code    Date of Service: 2024       Restrictions:  Restrictions/Precautions: Fall Risk       SUBJECTIVE:   Subjective: \"I have been working on moving my toes.\"    Pain  Pain: Denies pain pre and post session      OBJECTIVE:        Sit to Stand  Assistance Level: Modified independent  Stand to Sit  Assistance Level: Modified independent    Ambulation  Surface: Level surface  Device:  (no AD)  Distance: Multiple gait trials without AD ' each  Activity: Within Unit  Activity Comments: Reciprocal pattern intermittent decreased R foot clearance. Cues for arm swing and increased R hip flexion to clear R foot  Additional Factors: Verbal cues  Assistance Level: Stand by assist  Gait Deviations: Slow salinas;Decreased heel strike right;Narrow base of support    Stairs  Stair Height: 8''  Device: Bilateral handrails  Number of Stairs: 4  Additional Factors: Verbal cues;Non-reciprocal going down  Assistance Level: Stand by assist  Skilled Clinical Factors: 2lb weight on R ankle to faciliate improved clearance of R foot pt fluctuated between increased R hip flexion or circumduction to clear R foot.              PT Exercises  Dynamic Standing Balance Exercises: walking around unit picking items off the floor, completes at SBA level, good stability and safety awareness.  Motor Control/Coordination: seated RLE coordination exercises with foot rolling a ball. cues for smooth pursuits in all planes of motion.                        ASSESSMENT/PROGRESS TOWARDS GOALS:   Assessment  Assessment: continued to train gait without AD, coordination and balance exercises along with stairs while wearing 2lb weight on R ankle to improve clearance.    Goals:  Long Term Goals  Long Term Goal 1: indep bed mobility  Long Term Goal 2: indep sit to stand, 
Physical Therapy Rehab Treatment Note  [facility]ility/Department[facility] [place] [facility]  [facility]       NAME: Anahi Willett  : 1950 (74 y.o.)  MRN: [id number]  CODE STATUS: Full Code    Date of Service: 2024     Restrictions:  Restrictions/Precautions: Fall Risk    SUBJECTIVE:   Subjective: \" I was able to move my toes a little bit\"    Pain  Pain: Denies pain pre and post session    OBJECTIVE:             Roll Left  Assistance Level: Independent  Roll Right  Assistance Level: Independent  Sit to Supine  Assistance Level: Independent  Supine to Sit  Assistance Level: Independent  Scooting  Assistance Level: Independent    Sit to Stand  Assistance Level: Modified independent  Stand to Sit  Assistance Level: Supervision  Skilled Clinical Factors: Improved safety stepping back to surface  Bed To/From Chair  Technique: Stand step  Assistance Level: Supervision  Skilled Clinical Factors: With use of ww    Ambulation  Surface: Level surface  Device: Rolling walker (without AD)  Distance: 150' x 2 with AD, 60' x 2 without AD  Activity: Within Unit  Activity Comments: Gaith with ww improved R foot clearance. Without AD intermittent decreased R foot clearance  Additional Factors: Verbal cues  Assistance Level: Supervision;Minimal assistance (Supervision with AD, Zoraida without AD)  Gait Deviations: Slow salinas;Decreased heel strike right;Narrow base of support    Neuromuscular Education  Neuromuscular Education: Yes  NDT Treatment: Gait   Neuromuscular Comments: Gait without AD length of // x 3    PT Exercises  Exercise Treatment: Supine exercise: Oblique crunches, crunches, Leg press, Heel slides, Bridges, Single leg bridges, Sidelying clamshalls. reverse clamsheels, Hip abduction, AAROM RLE x 10 each     ASSESSMENT/PROGRESS TOWARDS GOALS:   Assessment  Assessment: Initiated gait training without AD completing with minimal assistance. Good safety with gait with ww and improved approach to chair. Continue to 
Subjective:   The patient complains of severe acute on chronic progressive fatigue and right-sided weakness, aphasia, confusion partially relieved by rest, medications, PT,  OT,   SLP and rest and exacerbated by recent hemorrhagic CVA.  I am concerned about patient’s medical complexities and barriers to advancing in rehab goals including decreasing her stroke risk.        I discussed current functional, rehabilitation, medical status with other rehabilitation providers including nursing and case management.  According to recent nursing note, \"A&O x4. Medicated with PRN Tylenol for 4/10 pain to RUE. Lidoderm patches applied. Pt refused bowel medications. Dr Johns made conrad. In chair with alarm activated. Call light in reach\".         ROS x10:  The patient also complains of severely impaired mobility and activities of daily living.  Otherwise no new problems with vision, hearing, nose, mouth, throat, dermal, cardiovascular, GI, , pulmonary, musculoskeletal, psychiatric or neurological. See Rehab H&P on Rehab chart dated .       Vital signs:  BP (!) 140/73   Pulse 73   Temp 97.9 °F (36.6 °C) (Oral)   Resp 18   Ht 1.702 m (5' 7\")   Wt 50.8 kg (111 lb 14.4 oz)   SpO2 96%   BMI 17.53 kg/m²   I/O:   PO/Intake:  fair PO intake, no problems observed or reported.  Regular diet    Bowel/Bladder:  continent, constipation and urinary urgency.  General:  Patient is well developed, adequately nourished, non-obese and     well kempt.     HEENT:    PERRLA, hearing intact to loud voice, external inspection of ear     and nose benign.  Inspection of lips, tongue and gums    Musculoskeletal: No significant change in strength or tone.  All joints stable.      Inspection and palpation of digits and nails show no clubbing,       cyanosis or inflammatory conditions.   Neuro/Psychiatric: Affect: flat.  Alert and oriented to person, place and     situation.  No significant change in deep tendon reflexes or     Sensation  -right 
Subjective:   The patient complains of severe acute on chronic progressive fatigue and right-sided weakness, aphasia, confusion partially relieved by rest, medications, PT,  OT,   SLP and rest and exacerbated by recent hemorrhagic CVA.  I am concerned about patient’s medical complexities and barriers to advancing in rehab goals including decreasing her stroke risk.        I discussed current functional, rehabilitation, medical status with other rehabilitation providers including nursing and case management.  According to recent nursing note, \"VSS. Medicated with prn Tylenol for c/o right sided pain. Lidoderm patches to RUE and right shoulder. Walked with patient down up using walker, did well. Still having c/o numbness to right foot. LBM 11/27\".     Happy o have hep lock out--less hand pain.  Her neck and back pain are flared up with the cold weather.  We discussed pain treatment options we will use Tylenol on a scheduled basis and eat as needed.  I will also add heat and [place]    ROS x10:  The patient also complains of severely impaired mobility and activities of daily living.  Otherwise no new problems with vision, hearing, nose, mouth, throat, dermal, cardiovascular, GI, , pulmonary, musculoskeletal, psychiatric or neurological. See Rehab H&P on Rehab chart dated .       Vital signs:  BP (!) 142/88   Pulse 79   Temp 97.7 °F (36.5 °C) (Oral)   Resp 18   Ht 1.702 m (5' 7\")   Wt 50.5 kg (111 lb 4.8 oz)   SpO2 94%   BMI 17.43 kg/m²   I/O:   PO/Intake:  fair PO intake, no problems observed or reported.  Regular diet    Bowel/Bladder:  continent, constipation and urinary urgency.  General:  Patient is well developed, adequately nourished, non-obese and     well kempt.     HEENT:    PERRLA, hearing intact to loud voice, external inspection of ear     and nose benign.  Inspection of lips, tongue and gums    Musculoskeletal: No significant change in strength or tone.  All joints stable.      Inspection and 
Subjective:   The patient complains of severe acute on chronic progressive fatigue and right-sided weakness, aphasia, confusion partially relieved by rest, medications, PT,  OT,   SLP and rest and exacerbated by recent hemorrhagic CVA.  I am concerned about patient’s medical complexities and barriers to advancing in rehab goals including decreasing her stroke risk.        I discussed current functional, rehabilitation, medical status with other rehabilitation providers including nursing and case management.  According to recent nursing note, \"alert and oriented pleasant and cooperative denies any pain or discomfort at this time. Ambulates to bathroom with walker good safety awareness continent of bowl and bladder. .     Happy o have hep lock out--les hand pain.    ROS x10:  The patient also complains of severely impaired mobility and activities of daily living.  Otherwise no new problems with vision, hearing, nose, mouth, throat, dermal, cardiovascular, GI, , pulmonary, musculoskeletal, psychiatric or neurological. See Rehab H&P on Rehab chart dated .       Vital signs:  BP (!) 149/79   Pulse 79   Temp 98.2 °F (36.8 °C) (Oral)   Resp 18   Ht 1.702 m (5' 7\")   Wt 50.5 kg (111 lb 4.8 oz)   SpO2 99%   BMI 17.43 kg/m²   I/O:   PO/Intake:  fair PO intake, no problems observed or reported.  Regular diet    Bowel/Bladder:  continent, constipation and urinary urgency.  General:  Patient is well developed, adequately nourished, non-obese and     well kempt.     HEENT:    PERRLA, hearing intact to loud voice, external inspection of ear     and nose benign.  Inspection of lips, tongue and gums    Musculoskeletal: No significant change in strength or tone.  All joints stable.      Inspection and palpation of digits and nails show no clubbing,       cyanosis or inflammatory conditions.   Neuro/Psychiatric: Affect: flat.  Alert and oriented to person, place and     situation.  No significant change in deep tendon 
Subjective:   The patient complains of severe acute on chronic progressive fatigue and right-sided weakness, aphasia, confusion partially relieved by rest, medications, PT,  OT,   SLP and rest and exacerbated by recent hemorrhagic CVA.  I am concerned about patient’s medical complexities and barriers to advancing in rehab goals including decreasing her stroke risk.        I discussed current functional, rehabilitation, medical status with other rehabilitation providers including nursing and case management.  According to recent nursing note, \"requesting to be \"independent\" in the room. Wants to walk around freely and states she feels like \"sitting in the wheelchair does not do any good\". Therapy made aware. Patient has been found walking around in room and in bathroom. Educated patient on safety concerns and use of call light if she wants to take a break from sitting in wheelchair. Aware that she must wait until she is deemed independent by therapy to walk in room without assistance. Alarms activated \".    We are preparing for her discharge tomorrow as she is transitioning to independent in the room and we are working on higher-level balance issues.    ROS x10:  The patient also complains of severely impaired mobility and activities of daily living.  Otherwise no new problems with vision, hearing, nose, mouth, throat, dermal, cardiovascular, GI, , pulmonary, musculoskeletal, psychiatric or neurological. See Rehab H&P on Rehab chart dated .       Vital signs:  BP (!) 142/73   Pulse 81   Temp 97.9 °F (36.6 °C) (Oral)   Resp 17   Ht 1.702 m (5' 7\")   Wt 52.6 kg (115 lb 15.4 oz)   SpO2 97%   BMI 18.16 kg/m²   I/O:   PO/Intake:  fair PO intake, no problems observed or reported.  Regular diet    Bowel/Bladder:  continent, constipation and urinary urgency.  General:  Patient is well developed, adequately nourished, non-obese and     well kempt.     HEENT:    PERRLA, hearing intact to loud voice, external 
Subjective:  The patient complains of moderate acute on chronic progressive fatigue and  weakness  partially relieved by rest, PT, OT and meds   and exacerbated by exertion and recent  .      I am concerned about patient’s medical complexities including:  Principal Problem:    Impaired mobility and activities of daily living  Resolved Problems:    * No resolved hospital problems. *      .    Controlled Substance Monitoring:    Acute and Chronic Pain Monitoring:        No data to display                    Reviewed recent nursing note and discussed current status and planned care with acute care providers, \"see notes  \".    ROS x10:  The patient also complains of severely impaired mobility and activities of daily living.  Otherwise no new problems with vision, hearing, nose, mouth, throat, dermal, cardiovascular, GI, , pulmonary, musculoskeletal, psychiatric or neurological.        Vital signs:  BP (!) 174/78   Pulse 88   Temp 97.6 °F (36.4 °C)   Resp 14   Ht 1.702 m (5' 7\")   Wt 50.5 kg (111 lb 4.8 oz)   SpO2 98%   BMI 17.43 kg/m²   I/O:   PO/Intake:    fair PO intake, monitoring for dysphagia    Bowel/Bladder:   continent,    General:  Patient is well developed, adequately nourished, and    well kempt.     HEENT:    PERRLA, hearing intact to loud voice, external inspection of ear and nose benign.  Inspection of lips, tongue and gums benign  Musculoskeletal: No significant change in strength or tone.  All joints stable.      Inspection and palpation of digits and nails show no clubbing, cyanosis or inflammatory conditions.   Neuro/Psychiatric: Affect: flat-  Alert and oriented to self and situation with   cues.  No significant change in deep tendon reflexes or sensation  Lungs:  Diminished, CTA-B  .  Respiration effort is normal at rest.   Heart:   S1 = S2,   RRR.      Abdomen:  Soft, non-tender    Extremities:  Trace  lower extremity edema but no unusual tenderness.  Skin:   BUE bruises dt blood 
[facility]   MUSIC THERAPY  Declined Visit       Date:  11/27/2024        Patient Name: Anahi Willett       MRN: [id number]        YOB: 1950 (74 y.o.)       Gender: female  Diagnosis: Impaired mobility and ADLs d/t due to traumatic left frontal intracerebral hemorrhage  Referring Practitioner: Trish Thacker MD    RESTRICTIONS/PRECAUTIONS:  Restrictions/Precautions: Fall Risk  Vision: Impaired  Hearing: Exceptions to WFL  Hearing Exceptions: Hard of hearing/hearing concerns    Subjective/Observation:   Patient declined participating in individual music therapy session at 12:25pm stating \"with all do respect, I'm going to pass.\" Patient thanked [facility] for stopping by to offer though.      Assessment/Plan:      [] Patient would like to have music therapy, just not today. [facility] if [facility] allows.      [] Patient would like to have music therapy another time, however their D/C is before [facility] will be back on unit.        *If patient is still on rehab unit, or comes back to rehab unit, [facility] then if time allows.      [x] Patient does not want music therapy. [facility] will not [facility] to [facility] pt again unless pt specifically requests.       ZAYRA Torres, DEE    11/27/2024  Electronically signed by Andree Moncada on 11/27/2024 at 1:06 PM   
       Goals:  Long Term Goals  Long Term Goal 1: indep bed mobility  Long Term Goal 2: indep sit to stand, bed and car transfers  Long Term Goal 3: indep gait with or without devices 50 feet with good judgement -  feet  Long Term Goal 4: pt to complete brasher at 25/56 level  Long Term Goal 5: Pt to complete 4 stairs with rail with SBA  Patient Goals   Patient Goals : to return to home not needing help    PLAN OF CARE/Safety:   Safety Devices  Type of Devices: Call light within reach;Chair alarm in place;Left in chair    Therapy Time:   Individual   Time In 1000   Time Out 1030   Minutes 30      Minutes: 30  Transfer/Bed mobility training: 10  Gait training: 15  Therapeutic ex: 5    Rufino Feliz PTA, 11/25/24 at 12:01 PM     
  Patient was then provided with a paper with 4 categories written on it with different numbers assigned to each category. Patient was able to correctly write words in the categories but did not provide the correct number on 2 of the 4 categories. When she was told there was a mistake patient was able to identify on her own what the mistake was and she was able to fix it.       Education:   Nothing specific this session    Equipment recommendations:  OT Equipment Recommendations  Other: none this session      ASSESSMENT:  Assessment: Patient is very motivated to participate in therapy to improve for succesful return to home. Patient is aware that she has areas of concern to work on and will continue to benefit from OT to address self care and IADL independence  Activity Tolerance: Patient tolerated treatment well      PLAN OF CARE:  Strengthening, Balance training, Functional mobility training, Endurance training, Patient/Caregiver education & training, Safety education & training, Pain management, Equipment evaluation, education, & procurement, Positioning, Self-Care / ADL, Home management training, Coordination training       Patient goals : \"To get out of here and take care of myself and be independent again.\"  Time Frame for Long Term Goals : Within 1 week, pt to demonstrate progress in the following areas listed below to achieve specific LTG's as stated in the initial evaluation.  Long Term Goal 1: Increase standing balance for improved safety and independence with ADLs.  Long Term Goal 2: Increase activity tolerance for improved safety and independence with ADLs.  Long Term Goal 3: Increase RUE strength/coordination/dexterity for improved safety and independence with ADLs.        Therapy Time:   Individual Group Co-Treat   Time In 1130       Time Out 1200         Minutes 30                   Cognitive Retrainin minutes     Electronically signed by:    BRADEN Anderson,   2024, 1:11 PM 
Bathing  Assistance Level: Independent  Lower Extremity Bathing  Assistance Level: Independent  Upper Extremity Dressing  Assistance Level: Independent  Lower Extremity Dressing  Assistance Level: Independent  Putting On/Taking Off Footwear  Assistance Level: Independent  Toileting  Assistance Level: Independent  Toilet Transfers  Assistance Level: Independent  Tub/Shower Transfers  Type: Shower  Transfer From: Rolling walker  Transfer To: Shower chair with back  Assistance Level: Modified independent         Functional Mobility  Device: Rolling walker  Activity: To/From bathroom  Assistance Level: Modified independent  Sit to Stand  Assistance Level: Modified independent  Stand to Sit  Assistance Level: Modified independent     Patient ambulated throughout the department and took window clings off of multiple windows. Patient was able to climb onto her knees on the mat table and remained in kneeling to remove multiple clings. Patient had difficulty with transitioning to side sit and sliding off of the mat and managing the right leg        Education:   Reminded patient to always keep an eye on her foot and make sure to place it flat     Equipment recommendations:  OT Equipment Recommendations  Other: Patient used a hand held shower and grab bar during the shower      ASSESSMENT:  Assessment: Patient made large gains in self care and is ready for discharge to home with assistance as needed from her family  Activity Tolerance: Patient tolerated treatment well      PLAN OF CARE:  Strengthening, Balance training, Functional mobility training, Endurance training, Patient/Caregiver education & training, Safety education & training, Pain management, Equipment evaluation, education, & procurement, Positioning, Self-Care / ADL, Home management training, Coordination training       Patient goals : \"To get out of here and take care of myself and be independent again.\"  Time Frame for Long Term Goals : Within 1 week, pt to 
Met  Long Term Goal 2: indep sit to stand, bed and car transfers- Met  Long Term Goal 3: indep gait with or without devices 50 feet with good judgement -  feet-- Met Independent 150'  Long Term Goal 4: pt to complete brasher at 25/56 level- Met 50/56  Long Term Goal 5: Pt to complete 4 stairs with rail with SBA- Met  Patient Goals   Patient Goals : to return to home not needing help    PLAN OF CARE/Safety:   Safety Devices  Type of Devices: Call light within reach;Chair alarm in place;Left in chair    Therapy Time:   Individual   Time In 0900   Time Out 1000   Minutes 60      Minutes: 60  Transfer/Bed mobility training: 15  Gait trainin  Neuro re education: 15  Therapeutic ex: 10    Rufino Feliz PTA, 24 at 10:25 AM     
beads in them to simulate dry cat food and placed them onto the floor while stabilizing herself holding onto the table. Patient was able to state different items in her garage that she will hold on to when she is bending down to place the bowls. Patient will need to walk down 2 steps to get to the garage. Patient had one minor loss of balance after she stood up and when she turned to speak to therapist. Patient was able to regain her balance with no assistance    Functional Mobility  Device:  (no device)  Activity: Transport items;Retrieve items  Assistance Level: Modified independent  Skilled Clinical Factors: Patient participated in kitchen mobility with no device carrying items and reaching into the refrigerator and oven with no loss of balance. Mild delay in movement of the right LE noted  Sit to Stand  Assistance Level: Modified independent  Stand to Sit  Assistance Level: Modified independent        Education:  Education  Education Given To: Patient  Education Provided: IADL Function  Education Provided Comments: Discussed safety during IADL tasks with homemaking and feeding the cats  Education Method: Verbal  Barriers to Learning: None  Education Outcome: Demonstrated understanding    Equipment recommendations:  None this session      ASSESSMENT:  Assessment: Patient continues to make gains in IADLs. Patient reported that she feels as though she is going to be able to handle everything at home but will have someone there the first few times she gets into the shower for safety. Patient is independent with safety concerns due to decreased higher level problem solving  Activity Tolerance: Patient tolerated treatment well      PLAN OF CARE:  Strengthening, Balance training, Functional mobility training, Endurance training, Patient/Caregiver education & training, Safety education & training, Pain management, Equipment evaluation, education, & procurement, Positioning, Self-Care / ADL, Home management training, 
car transfers  Long Term Goal 3: indep gait with or without devices 50 feet with good judgement -  feet  Long Term Goal 4: pt to complete brasher at 25/56 level  Long Term Goal 5: Pt to complete 4 stairs with rail with SBA  Patient Goals   Patient Goals : to return to home not needing help    PLAN OF CARE/Safety:   Safety Devices  Type of Devices: Call light within reach;Chair alarm in place;Left in chair      Therapy Time:   Individual   Time In 1431   Time Out 1502   Minutes 31      Minutes:  Transfer/Bed mobility trainin  Gait training:10  Neuro re education:5  Therapeutic ex:11      Agnieszka Estrada PTA, 24 at 3:13 PM             
chair. Continue to progress stability with all weight bearing activities  Activity Tolerance: Patient tolerated treatment well  Discharge Recommendations: Continue to assess pending progress    Goals:  Long Term Goals  Long Term Goal 1: indep bed mobility  Long Term Goal 2: indep sit to stand, bed and car transfers  Long Term Goal 3: indep gait with or without devices 50 feet with good judgement -  feet  Long Term Goal 4: pt to complete brasher at 25/56 level  Long Term Goal 5: Pt to complete 4 stairs with rail with SBA  Patient Goals   Patient Goals : to return to home not needing help    PLAN OF CARE/Safety:   Safety Devices  Type of Devices: Call light within reach;Chair alarm in place;Left in chair    Therapy Time:   Individual   Time In 1300   Time Out 1330   Minutes 30      Minutes: 30  Gait trainin  Neuro re education: 10    Rufino Feliz PTA, 24 at 3:02 PM     
factory  Leisure & Hobbies: taking care of the house, exercising  Additional Comments: Pt reports she is very active and has 2 hour workouts 3 days a week routinely    OBJECTIVE:   Vision/Hearing:  Vision  Vision: Impaired  Vision Exceptions: Wears glasses at all times  Hearing: Exceptions to WFL  Hearing Exceptions: Hard of hearing/hearing concerns    Cognition/Observation:  Orientation Level: Oriented to time, Oriented to person, Oriented to situation, Oriented to place (mildly impulsive - takes direction well)  Follows Commands: Within Functional Limits         ROM:  RLE PROM: WFL  LLE PROM: WFL    Strength:  Strength RLE  Comment: 3-/5 hip and knee, 0/5 df  Strength LLE  Comment: grossly 4/5    Neuro:  Balance  Sitting - Static: Good  Sitting - Dynamic: Good;- (no LOB wth reaching - mild concern initially)  Standing - Static: Poor (pt unable to maintain standing without assistance - LOB laterally)  Standing - Dynamic: Poor (LOB lat in gait - required assistance at trunk and pelvis for maintenance - improved with use of ww for support)                            Bed mobility  Rolling to Left: Stand by assistance  Rolling to Right: Stand by assistance  Supine to Sit: Stand by assistance  Sit to Supine: Stand by assistance  Scooting: Stand by assistance  Bed Mobility Comments: Mild impulsivity noted  - mild post LOB however able to correct balance without assistance; HOB flat with no rails    Transfers  Sit to Stand: Minimal Assistance (UE and LE push against bed/chair required across multiple trials)  Stand to Sit: Minimal Assistance  Bed to Chair: Minimal assistance (pivot to the right - poor RLE motor control throughout)    Ambulation  Surface: Level tile  Device: No Device  Assistance: Moderate assistance  Quality of Gait: poor RLE weight shift with dereased LLE step length, requires trunk and pelvic stability to advance RLE in addition to vaulting over LLE, LOB lat requiring assist for recovery  Distance: 5 
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  Leisure & Hobbies: taking care of the house, exercising  Additional Comments: Pt reports she is very active and has 2 hour workouts 3 days a week routinely    Current Functional Status:  ADL  Feeding: Independent  Grooming: Independent  UE Bathing: Independent  LE Bathing: Independent  UE Dressing: Independent  LE Dressing: Independent  Putting On/Taking Off Footwear: Independent  Toileting: Independent  Functional Mobility: Independent  Toilet Transfers  Toilet - Technique: Ambulating  Equipment Used: Standard toilet  Toilet Transfer: Independent  Tub Transfers  Tub - Transfer From: Rolling walker  Tub - Transfer Type: To and From  Tub - Transfer To: Standing  Tub Transfers: Modified independence  Shower Transfers  Shower - Transfer From: Wheelchair  Shower - Transfer Type: To and From  Shower - Transfer To: Standing  Shower - Technique: Ambulating  Shower Transfers: Modified independence    Orientation Status:  Orientation  Overall Orientation Status: Within Functional Limits  Orientation Level: Oriented X4  Orientation  Overall Orientation Status: Within Normal Limits    Cognition Status:  Cognition  Overall Cognitive Status: WFL  Cognition Comment: Patient midlly impulsive at times    Perception Status:  Perception  Overall Perceptual Status: WFL    Sensation Status:  Sensation  Overall Sensation Status: WFL    Vision and Hearing Status:  Vision  Vision: Impaired  Vision Exceptions: Wears glasses for reading  Hearing  Hearing: Exceptions to WFL  Hearing Exceptions: Hard of hearing/hearing concerns     UE Function Status:    ROM:   LUE AROM (degrees)  LUE AROM : WFL  Left Hand AROM (degrees)  Left Hand AROM: WFL  RUE AROM (degrees)  RUE AROM : WFL  Right Hand AROM (degrees)  Right Hand AROM: WFL    Strength:  LUE Strength  Gross LUE Strength: WFL  L Hand General: 5/5  LUE Strength Comment: shoulder flexion assessment  RUE Strength  Gross RUE Strength: WFL  R Hand General: 
pillow behind pt on last 5 min d/t pt c/o of shoulder pain.  Pt completed task to increase strength, functional act tolerance and transfer safety to continue improving levels of Ind with ADL/IADL tasks.        Dowel ring/mitzi task completed in standing with SBA.  Pt donned all rings/doff all rings, completing donning x 12 rings each row, last row was x 11 rings, using RUE/LUE for corresponding sides of dowels.  Pt stood around 11 min, leaning more on L side than R and verbalized willfully she is aware of this.  Pt pointed this out.  Pt completed task to increase standing tolerance and act tolerance to increase ease/Ind with transfers and ADL task safety/Ind.        50 pc state puzzle using 1/2# wrist wts completed with Ind.  Pt states she felt like she had a bit of hard time completing task d/t needing to know where all the pcs go, but felt good to have a challenge.  Pt retrieved puzzle pcs according to which side they were on with corresponding UE, then placed in on the puzzle board.  Pt completed task efficiently.  Pt completed task to improve with BUE strength, functional reaching and act tolerance/fine motor skills in order to continue improving with ADL/IADL tasks.       Education:   Pt educated on placing BLE wt evenly between both legs/ 50% each, d/t pt asking how to correct L side heavier lean.  Pt demo ability to complete this.    ASSESSMENT:  Assessment: Pt demo repeating on events from prior day, possible decreased STM deficits.  Pt is very cooperative and demo good self awareness at times, and asks how to improve on tasks.  Activity Tolerance: Patient tolerated treatment well      PLAN OF CARE:  Strengthening, Balance training, Functional mobility training, Endurance training, Patient/Caregiver education & training, Safety education & training, Pain management, Equipment evaluation, education, & procurement, Positioning, Self-Care / ADL, Home management training, Coordination training       Patient goals 
seconds   Peripheral Pulses: +2 palpable, equal bilaterally       Labs:   No results for input(s): \"WBC\", \"HGB\", \"HCT\", \"PLT\" in the last 72 hours.  No results for input(s): \"NA\", \"K\", \"CL\", \"CO2\", \"BUN\", \"CREATININE\", \"CALCIUM\", \"PHOS\" in the last 72 hours.    Invalid input(s): \"MAGNES\"  No results for input(s): \"AST\", \"ALT\", \"BILIDIR\", \"BILITOT\", \"ALKPHOS\" in the last 72 hours.  No results for input(s): \"INR\" in the last 72 hours.  No results for input(s): \"CKTOTAL\", \"TROPONINI\" in the last 72 hours.    Urinalysis:      Lab Results   Component Value Date/Time    NITRU Negative 11/18/2024 11:04 AM    BLOODU Negative 11/18/2024 11:04 AM    SPECGRAV 1.030 12/08/2018 01:03 PM    GLUCOSEU Negative 11/18/2024 11:04 AM       Radiology:  No orders to display           Assessment/Plan:    Active Hospital Problems    Diagnosis Date Noted    Impaired mobility and activities of daily living [Z74.09, Z78.9] 11/23/2024    Intracranial hemorrhage (HCC) [I62.9] 11/18/2024    Hemorrhagic cerebrovascular accident (CVA) (HCC) [I61.9] 11/18/2024    Thyroid nodule [E04.1] 11/17/2022    Lung nodules [R91.8] 11/17/2022    Chronic left shoulder pain [M25.512, G89.29] 04/28/2020    Iron deficiency anemia [D50.9] 07/27/2016    Osteopenia [M85.80] 06/17/2016    Essential hypertension [I10] 06/17/2016         DVT Prophylaxis: hold  Diet: ADULT DIET; Regular  Code Status: Full Code    PT/OT Eval Status: done    Dispo -   Fall/L frontal bleeding with R foot drop-   PT on case, hold anticoagulation, continue keppra for seizure precautions   Nasal bones fracture/hematoma- supportive care  Thyroid nodules- per endocrinology service  HTN- well controlled  Medically stable for acute rehab admission at [facility]         TTS: 45 minutes where I focused more than 75% of my attention on rendering care, and planning treatment course for this patient, in addition to talking to RN team, mid levels, consulting with other physicians and following up on labs and 
100 mg Oral Daily    lidocaine  3 patch TransDERmal Daily    atorvastatin  20 mg Oral Daily    losartan  100 mg Oral Daily    melatonin  5 mg Oral Nightly    vitamin C  500 mg Oral Daily    docusate sodium  100 mg Oral BID    amLODIPine  10 mg Oral Daily    polyethylene glycol  17 g Oral Daily    levETIRAcetam  1,000 mg Oral BID     Continuous Infusions:    Objective:   Vitals: BP (!) 150/78   Pulse 84   Temp 97.5 °F (36.4 °C) (Oral)   Resp 18   Ht 1.702 m (5' 7\")   Wt 50.5 kg (111 lb 4.8 oz)   SpO2 98%   BMI 17.43 kg/m²    Wt Readings from Last 3 Encounters:   11/22/24 50.5 kg (111 lb 4.8 oz)   11/18/24 52.6 kg (116 lb)   10/15/23 53.1 kg (117 lb)        General appearance: alert, appears stated age, cooperative, and no distress  Skin: Ecchymosis of right side of her face  Neck: No thyromegaly or palpable nodules  Lungs: clear to auscultation bilaterally  Heart: regular rate and rhythm, S1, S2 normal, no murmur, click, rub or gallop  Abdomen: soft, non-tender. Bowel sounds normal. No masses,  no organomegaly.  Extremities: extremities normal, atraumatic, no cyanosis or edema    Patient Active Problem List:     Precordial chest pain     Intracranial hemorrhage (HCC)     Hemorrhagic cerebrovascular accident (CVA) (HCC)     Impaired mobility and activities of daily living     Calcification of abdominal aorta (HCC)     Centrilobular emphysema (HCC)     Cervical cancer (HCC)     Chronic left shoulder pain     Coronary artery calcification seen on CAT scan     Essential hypertension     Iron deficiency anemia     Lung nodules     Osteopenia     Thyroid nodule        Electronically signed by KALEN Brown on 11/26/2024 at 4:41 PM  
Code    PT/OT Eval Status: done    Dispo -  Fall/L frontal bleeding with R foot drop-   PT on case, hold anticoagulation, continue keppra for seizure precautions   Nasal bones fracture/hematoma- supportive care  Thyroid nodules- per endocrinology service, TSH/US done  HTN- well controlled  Medically stable for acute rehab admission at [facility]    TTS: 45 minutes where I focused more than 75% of my attention on rendering care, and planning treatment course for this patient, in addition to talking to RN team, mid levels, consulting with other physicians and following up on labs and imaging.    Electronically signed by Hernandez Blue MD on 12/2/2024 at 1:13 PM  
Functional mobility training, Endurance training, Patient/Caregiver education & training, Safety education & training, Pain management, Equipment evaluation, education, & procurement, Positioning, Self-Care / ADL, Home management training, Coordination training       Patient goals : \"To get out of here and take care of myself and be independent again.\"  Time Frame for Long Term Goals : Within 1 week, pt to demonstrate progress in the following areas listed below to achieve specific LTG's as stated in the initial evaluation.  Long Term Goal 1: Increase standing balance for improved safety and independence with ADLs.  Long Term Goal 2: Increase activity tolerance for improved safety and independence with ADLs.  Long Term Goal 3: Increase RUE strength/coordination/dexterity for improved safety and independence with ADLs.      Therapy Time:   Individual Group Co-Treat   Time In 1330       Time Out 1400         Minutes 30         ADL/IADL trainin minutes     Electronically signed by:    JASMYN Ruby,   2024, 2:34 PM               
thankful. Pt provided with red Theraband for use in room during down time. Pt reports that she exercises with Therabands at home. Pt very thankful.       Equipment recommendations:  Continue to assess pending progress.       ASSESSMENT: Pt would benefit from continued Occupational Therapy to increase strength, activity tolerance, Curry with functional transfers, and Curry with ADL/IADL completion.    Activity Tolerance: Patient tolerated treatment well    Performance Deficits/Impairments:   Decreased High-level IADLs  Decreased ADL Status  Decreased Functional Mobility  Decreased Balance  Decreased Strength  Decreased Endurance  Decreased Coordination  Decreased Posture     PLAN OF CARE:  Strengthening, Balance training, Functional mobility training, Endurance training, Patient/Caregiver education & training, Safety education & training, Pain management, Equipment evaluation, education, & procurement, Positioning, Self-Care / ADL, Home management training, Coordination training       Patient goals : \"To get out of here and take care of myself and be independent again.\"  Time Frame for Long Term Goals : Within 1 week, pt to demonstrate progress in the following areas listed below to achieve specific LTG's as stated in the initial evaluation.  Long Term Goal 1: Increase standing balance for improved safety and independence with ADLs.  Long Term Goal 2: Increase activity tolerance for improved safety and independence with ADLs.  Long Term Goal 3: Increase RUE strength/coordination/dexterity for improved safety and independence with ADLs.      Therapy Time:   Individual Group Co-Treat   Time In 1030       Time Out 1130         Minutes 60         ADL/IADL trainin minutes  Therapeutic activities: 15 minutes     Electronically signed by:    JASMYN Ruby,   2024, 11:56 AM               
0925)  Additional Factors: Verbal cues;Reciprocal going up;Non-reciprocal going down (12/02/24 0925)  Assistance Level: Modified independent (12/02/24 0925)  Skilled Clinical Factors: Circumducts to clear RLE compelted (12/01/24 1012)  W/C mobility:  Wheelchair Activities  Propulsion: No (11/23/24 1215)        Pt and Family training goals-  Focus on sequencing and endurance        OCCUPATIONAL THERAPY  Feeding  Assistance Level: Independent (12/02/24 1224)  Grooming/Oral Hygiene  Assistance Level: Independent (12/02/24 1224)  Skilled Clinical Factors: Pt sat/stood to complete oral care, hair care, and don cosmetics. Pt with Good static standing balance (11/29/24 1153)  Upper Extremity Bathing  Assistance Level: Independent (12/02/24 1224)  Skilled Clinical Factors: Use of detachable shower head (11/29/24 1153)  Lower Extremity Bathing  Assistance Level: Independent (12/02/24 1224)  Skilled Clinical Factors: standing and seated - uses figure 4 position to wash at times. dries legs by placing L foot on grab bar and using figure 4 for RLE (11/24/24 0904)  Upper Extremity Dressing  Assistance Level: Independent (12/02/24 1224)  Skilled Clinical Factors: Pt doffed/donned t-shirt. Pt gathered own clothes prior to therapist's arrival. Pt sits in WC and walks herself around room (11/29/24 1153)  Lower Extremity Dressing  Assistance Level: Independent (12/02/24 1224)  Skilled Clinical Factors: underwear (11/29/24 1153)  Putting On/Taking Off Footwear  Assistance Level: Independent (12/02/24 1224)  Skilled Clinical Factors: socks (11/29/24 1153)  Toileting  Assistance Level: Independent (12/02/24 1224)  Skilled Clinical Factors: seated toilet hygiene and standing pant management (11/25/24 1326)  Toilet Transfers  Technique: Stand pivot (11/29/24 1153)  Equipment: Grab bars;Standard toilet (11/29/24 1153)  Additional Factors: With handrails (11/29/24 1153)  Assistance Level: Independent (12/02/24 1224)  Skilled Clinical 
disorder:  consider prn low dose Ambien, monitor for day time sedation.  Add HS \"Tuck In\"  Falls risk elevated:  patient to use call light to get nursing assistance to get up, bed and chair alarm.  Elevated DVT risk: progressive activities in PT, continue prophylaxis BRIANNA hose, elevation.  Complex discharge planning:    DC-12/3/24 home with  and HHC.  P final weekly team meeting    Monday to re-assess progress towards goals, discuss and address social, psychological and medical comorbidities and to address difficulties they may be having progressing in therapy.  Patient and family education is in progress.  The patient is to follow-up with their family physician after discharge.        Complex Active General Medical Issues that complicate care Assess & Plan:         Intracranial hemorrhage, Hemorrhagic cerebrovascular accident (CVA) -neuro consult control blood pressure    Chronic left shoulder pain    Essential hypertension-Acute rehab to monitor heart rate and rhythm with the option of telemetry and the effects of chronotropic medication with respect to increasing physical activity and exercise in PT, OT, ADLs with medication titration to lowest effective dosing.  Continue blood signs every shift focusing on heart rate, rhythm and blood pressure checks with orthostatic checks-monitoring the effect of exercise, therapy and posture.  Consult hospitalist for backup medical and adjust/add medications.  Monitor heart rate and blood pressure as well as medications effects on vital signs before during and after therapy with especial focus on preventing orthostasis and falls risk.    Iron deficiency anemia-Monitor vital signs monitor for orthostasis and tachycardia, check H&H prn.  Vitamin B12 and iron-dose iron with food to prevent GI upset.  Monitor for constipation.  Monitor stools for blood.    Lung vzmgabk-orelqw-je as an outpatient    Osteopenia-Add high-dose vitamin D, recheck vitamin D level out after 
function.  Lactinex 2 PO every AC.  MOM prn, Brown Bomb prn, Glycerin suppository prn, enema prn.  Severe back pain as well as generalized OA pain: reassess pain every shift and prior to and after each therapy session, give prn Tylenol and consider scheduled Tylenol, modalities prn in therapy, Lidoderm, K-pad prn.   Skin healing and breakdown risk:  continue pressure relief program.  Daily skin exams and reports from nursing.  Severe fatigue due to nutritional and hydration deficiency: Add vitamin B12 vitamin D and CoQ10 continue to monitor I&O’s, calorie counts prn, dietary consult prn.  Add healthy HS snack.  Acute episodic insomnia with situational adjustment disorder:  consider prn low dose Ambien, monitor for day time sedation.  Add HS \"Tuck In\"  Falls risk elevated:  patient to use call light to get nursing assistance to get up, bed and chair alarm.  Elevated DVT risk: progressive activities in PT, continue prophylaxis BRIANNA hose, elevation.  Complex discharge planning:    DC-12/3/24 home with  and HHC.  Until then continue-Weekly team meeting  every Monday to re-assess progress towards goals, discuss and address social, psychological and medical comorbidities and to address difficulties they may be having progressing in therapy.  Patient and family education is in progress.  The patient is to follow-up with their family physician after discharge.        Complex Active General Medical Issues that complicate care Assess & Plan:         Intracranial hemorrhage, Hemorrhagic cerebrovascular accident (CVA) -neuro consult control blood pressure    Chronic left shoulder pain    Essential hypertension-Acute rehab to monitor heart rate and rhythm with the option of telemetry and the effects of chronotropic medication with respect to increasing physical activity and exercise in PT, OT, ADLs with medication titration to lowest effective dosing.  Continue blood signs every shift focusing on heart rate, rhythm and 
comorbidities and to address difficulties they may be having progressing in therapy.  Patient and family education is in progress.  The patient is to follow-up with their family physician after discharge.        Complex Active General Medical Issues that complicate care Assess & Plan:         Intracranial hemorrhage, Hemorrhagic cerebrovascular accident (CVA) -neuro consult control blood pressure    Chronic left shoulder pain    Essential hypertension-Acute rehab to monitor heart rate and rhythm with the option of telemetry and the effects of chronotropic medication with respect to increasing physical activity and exercise in PT, OT, ADLs with medication titration to lowest effective dosing.  Continue blood signs every shift focusing on heart rate, rhythm and blood pressure checks with orthostatic checks-monitoring the effect of exercise, therapy and posture.  Consult hospitalist for backup medical and adjust/add medications.  Monitor heart rate and blood pressure as well as medications effects on vital signs before during and after therapy with especial focus on preventing orthostasis and falls risk.    Iron deficiency anemia-Monitor vital signs monitor for orthostasis and tachycardia, check H&H prn.  Vitamin B12 and iron-dose iron with food to prevent GI upset.  Monitor for constipation.  Monitor stools for blood.    Lung palreqg-hatabv-cz as an outpatient    Osteopenia-Add high-dose vitamin D, recheck vitamin D level out after discharge,    Thyroid nodule-follow-up with PCP      Focus on balancing physical with emotional rehabilitation.  Involve rehabilitation psychology and spiritual care if patient is agreeable.  Right AFO at [place].      [facility] Johns D.O., PM&R     Attending    [phone]   [facility] [place]    
preventing orthostasis and falls risk.    Iron deficiency anemia-Monitor vital signs monitor for orthostasis and tachycardia, check H&H prn.  Vitamin B12 and iron-dose iron with food to prevent GI upset.  Monitor for constipation.  Monitor stools for blood.    Lung nodules    Osteopenia    Thyroid nodule         Focus of today's plan-  Initiate and modify therapuetic plan to meet patients individual needs, add rest breaks as needed Focus on endurance, activity pacing, reassessing rehab goals and discharge planning.           Miesha Johns D.O., PM&R     Attending    [phone]   [facility]    
independence with ADLs.    - Patient will complete self care as followed using the recommended adaptive equipment and/or adaptive techniques as instructed:  Feeding: Independent  Grooming: Independent  Bathing: Mod I  UE Dressing: Independent  LE Dressing: Mod I  Footwear: Mod I  Toileting: Mod I  Toilet Transfer: Mod I  Shower/Tub Transfer: Mod I  - Patient will improve static and dynamic standing balance to complete pants management at Mod I level  - Patient will improve functional endurance to tolerate/complete 60 minutes of ADLs.  - Patient will improve R UE strength and endurance to 4/5 in order to participate in self-care activities as projected.  - Patient will improve R hand fine motor coordination to good in order to manage clothing fasteners/self-care containers in a timely manner  - Patient will perform kitchen mobility at device level without episodes of LOB and good safety awareness   - Patient will perform basic room mobility at Mod I level.  - Patient will access appropriate D/C site with as few architectural barriers as possible.  - Patient and/or caregiver will demonstrate understanding of recommended HEP for RUE strengthening.   - Patient's cognition will improve or maintain baseline to safely perform ADLs:  Comprehension: Independent  Expression: Independent  Social Interaction: Independent  Problem Solving: Independent  Memory: Independent  -Patient will participate in [facility] cognitive assessment     Therapy Time:   Individual   Time In 0830   Time Out 0930   Minutes 60            Eval: 60 minutes     Electronically signed by:    Natalee Forrest OT,   11/23/2024, 11:38 AM         
intensity appears normal. The soft tissues demonstrate no acute abnormality.     1. Stable appearance of the rounded intraparenchymal hemorrhage in the high medial left mid-anterior parietal gray-white junction. There is stable mild surrounding edema.  No mass effect. There is no sign of an underlying mass to result in this hematoma. 2. No sign of acute infarct. 3. No change in mild age-appropriate atrophy and mild small vessel ischemia.     CT HEAD WO CONTRAST    Result Date: 11/19/2024  EXAMINATION: CT OF THE HEAD WITHOUT CONTRAST  11/19/2024 11:44 am TECHNIQUE: CT of the head was performed without the administration of intravenous contrast. Automated exposure control, iterative reconstruction, and/or weight based adjustment of the mA/kV was utilized to reduce the radiation dose to as low as reasonably achievable.  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  There is mild surrounding edema. No midline shift is seen..  Mild periventricular white matter changes are again seen most consistent with small vessel disease.  There is no evidence of hydrocephalus. ORBITS: The visualized portion of the orbits demonstrate no acute abnormality. SINUSES: The visualized paranasal sinuses and mastoid air cells demonstrate no acute abnormality. SOFT TISSUES/SKULL:  No acute abnormality of the visualized skull or soft tissues.     A 2 cm hematoma is again seen in the left frontal region near the vertex. There is no significant change when compared 
 Patient seems to be making fair to good response to these interventions.         Based on a comprehensive evaluation of the above, the individualized therapy and Discharge plan will be:    -Times stated are an average that will be varied based on the patient's daily need.        PT   1 1/2  hrs/day 5-7 days per wk      OT   1 1/2 hrs per day 5-7 days per wk     ST  1/2    hrs /day 3-5 days per week       Estimated LOS   1-2 week(s)    -Overall functional prognosis:     [x]  Good    []  Fair    []  Poor     -Medical Prognosis:   [x]  Good    []  Fair    []  Poor    This patient was made aware of the discussion of Plan of Care, their projected dicharge date and their projected function at discharge.         Miesha Johns, DO

## 2024-12-24 ENCOUNTER — HOSPITAL ENCOUNTER (OUTPATIENT)
Dept: PHYSICAL THERAPY | Age: 74
Setting detail: THERAPIES SERIES
Discharge: HOME | End: 2024-12-24
Payer: MEDICARE

## 2024-12-24 PROCEDURE — 97162 PT EVAL MOD COMPLEX 30 MIN: CPT

## 2024-12-24 NOTE — THERAPY EVALUATION
Physical Therapy Evaluation/Plan of Care   [facility]   [place] [id number]  Dept: [phone]  Dept Fax: [phone]  Loc: [phone]    Physical Therapy: Initial Evaluation    General Information    Patient: Anahi Willett (74 y.o.     female)   Examination Date: 2024   :  1950 ;    Confirmed: Yes MRN: [id number]  CSN: [id number]   Insurance: Payor: [employer] MEDICARE ADVANTAGE / Plan: [employer] CHOICE HMO / Product Type: *No Product type* /   Insurance ID: [id number] - (Medicare Managed)  PT Insurance Information: Medical [place] Medicare Secondary Insurance (if applicable):     Referring Physician: Fahad Mayorga DO       Visits to Date/Visits Approved:  /  (bmn, $35 copay)    No Show/Cancelled Appts: 0 / 0     Medical Diagnosis: Unsteadiness on feet [R26.81]  Other symptoms and signs involving the musculoskeletal system [R29.898]  Nontraumatic intracranial hemorrhage, unspecified [I62.9]        Treatment Diagnosis: Rt LE Weakness      SUBJECTIVE:     Onset date: 24       Subjective/ Mechanism of Injury: Had a CVA on 24 - woke up on kitchen floor and crawled to bed because Rt leg wouldn't move.  Went to ER and was diagnosed with a brain bleed.  Was in inpatient rehab ~12 days.  Pt was discharged home and is doing 30 minutes each day on the treadmill.  Was exercising previously and has now cut her routine in half.  Pt reports Rt leg still feels weak.  Pt reports doing some exercises and has less control.  Feels her balance is doing well.         Recent Falls: yes, 1 d/t collapsing on the    Near Falls: no  Syncope: no      Interventions for current problem:  exercise      Pain:   Current: denies/10         Imaging results (If Applicable): US HEAD NECK SOFT TISSUE THYROID    Result Date: 2024  EXAMINATION: THYROID ULTRASOUND 2024 COMPARISON: None. HISTORY:

## 2025-01-02 ENCOUNTER — HOSPITAL ENCOUNTER (OUTPATIENT)
Dept: PHYSICAL THERAPY | Age: 75
Setting detail: THERAPIES SERIES
Discharge: HOME | End: 2025-01-02
Payer: MEDICARE

## 2025-01-02 PROCEDURE — 97110 THERAPEUTIC EXERCISES: CPT

## 2025-01-02 NOTE — PROGRESS NOTES
Minutes  Procedure Minutes:0    Timed Activity Minutes Units   Ther Ex 35 3   Neuro Aron 5 0     Electronically signed by Kayleigh Martínez PT on 1/2/25 at 12:01 PM EST   clear

## 2025-01-08 ENCOUNTER — HOSPITAL ENCOUNTER (OUTPATIENT)
Dept: PHYSICAL THERAPY | Age: 75
Setting detail: THERAPIES SERIES
Discharge: HOME | End: 2025-01-08
Payer: MEDICARE

## 2025-01-08 PROCEDURE — 97110 THERAPEUTIC EXERCISES: CPT

## 2025-01-08 PROCEDURE — 97112 NEUROMUSCULAR REEDUCATION: CPT

## 2025-01-08 NOTE — PROGRESS NOTES
[facility]  Outpatient Physical Therapy   Treatment Note        Date: 2025  Patient: Anahi Willett  : 1950   Confirmed: Yes  MRN: [id number]  Referring Provider: Fahad Mayorga DO      Medical Diagnosis: Unsteadiness on feet [R26.81]  Other symptoms and signs involving the musculoskeletal system [R29.898]  Nontraumatic intracranial hemorrhage, unspecified [I62.9]      Treatment Diagnosis: Rt LE Weakness    Visit Information:  Insurance: Payor: [employer] MEDICARE ADVANTAGE / Plan: [employer] CHOICE HMO / Product Type: *No Product type* /   PT Visit Information  PT Insurance Information: Medical [place] Medicare  Total # of Visits Approved:  (bmn, $35 copay)  Total # of Visits to Date: 3  Plan of Care/Certification Expiration Date: 25  No Show: 0  Progress Note Due Date: 25  Canceled Appointment: 0  Progress Note Counter: 3/6    Subjective Information:  Subjective: Pt reports that the leg is better some tingling in toes.  HEP Compliance:  [x] Good [] Fair [] Poor [] Reports not doing due to:    Pain Screening  Patient Currently in Pain: Denies    Treatment:  Exercises:  Exercises  Exercise 4: SLS L LE 10-15 sec on ground and foam r 8-9 sec on foam and ground  Exercise 5: weighted walkouts 2 plates 4 ways occ CGA  Exercise 7: GTB 4way hip  Exercise 8: monster walks and lateral walks RTB x10  Exercise 9: lunges fwd and lateral into bosu ball with one UE to mainatian balance  Exercise 11: tandem walking x4 laps  Exercise 12: step over 8 inch allie fwd and lateral 10 feet x2  Exercise 13: 6\" 2-way step-ups x10 reps, maia    *Indicates exercise, modality, or manual techniques to be initiated when appropriate    Objective Measures:   LTG 2 Current Status:: SLS L up to 15 sec R up to 9 sec    Assessment:   Body Structures, Functions, Activity Limitations Requiring Skilled Therapeutic Intervention: Decreased functional mobility , Decreased strength, Decreased

## 2025-01-15 ENCOUNTER — TELEPHONE (OUTPATIENT)
Dept: NEUROLOGY | Age: 75
End: 2025-01-15

## 2025-01-15 ENCOUNTER — HOSPITAL ENCOUNTER (OUTPATIENT)
Dept: PHYSICAL THERAPY | Age: 75
Setting detail: THERAPIES SERIES
Discharge: HOME | End: 2025-01-15
Payer: MEDICARE

## 2025-01-15 PROCEDURE — 97110 THERAPEUTIC EXERCISES: CPT

## 2025-01-15 NOTE — TELEPHONE ENCOUNTER
The nurse at the [facility] called and stated that she needs a procedure sooner than later and wanted to know can she have a clearance. Pt has never been seen in office will have hospital follow up with dena on 2/27/25       Please advised

## 2025-01-22 ENCOUNTER — HOSPITAL ENCOUNTER (OUTPATIENT)
Dept: PHYSICAL THERAPY | Age: 75
Setting detail: THERAPIES SERIES
Discharge: HOME | End: 2025-01-22
Payer: MEDICARE

## 2025-01-22 PROCEDURE — 97110 THERAPEUTIC EXERCISES: CPT

## 2025-01-22 PROCEDURE — 97112 NEUROMUSCULAR REEDUCATION: CPT

## 2025-01-22 NOTE — PROGRESS NOTES
[facility]  Outpatient Physical Therapy   Treatment Note        Date: 2025  Patient: Anahi Willett  : 1950   Confirmed: Yes  MRN: [id number]  Referring Provider: Fahad Mayorga DO      Medical Diagnosis: Unsteadiness on feet [R26.81]  Other symptoms and signs involving the musculoskeletal system [R29.898]  Nontraumatic intracranial hemorrhage, unspecified [I62.9]      Treatment Diagnosis: Rt LE Weakness    Visit Information:  Insurance: Payor: [employer] MEDICARE ADVANTAGE / Plan: [employer] CHOICE HMO / Product Type: *No Product type* /   PT Visit Information  PT Insurance Information: Medical [place] Medicare  Total # of Visits Approved:  (bmn, $35 copay)  Total # of Visits to Date: 5  Plan of Care/Certification Expiration Date: 25  No Show: 0  Progress Note Due Date: 25  Canceled Appointment: 0  Progress Note Counter:     Subjective Information:  Subjective: Reports getting xray on shoulder and reporting she has arthritis. States she feels like she is doing better and wants to make today her last day.  HEP Compliance:  [x] Good [] Fair [] Poor [] Reports not doing due to:    Pain Screening  Patient Currently in Pain: Denies (pain with laying on R shoulder)    Treatment:  Exercises:  Exercises  Exercise 1: 3 way SLR x10 reps x2.5#, each  Exercise 3: sci fit L 2.5 5 min LE only  Exercise 4: foam: FA: EO/EC ; FT: EO/EC ; tandem (maia) ; marching x 1min each  Exercise 8: tandem walking, carioca, walking march 2.5# x3 laps, each  Exercise 19: objective measures  Exercise 20: HEP: Continue Current + foam balance, walking march, tandem, carioca    *Indicates exercise, modality, or manual techniques to be initiated when appropriate    Objective Measures:     Strength RLE  R Hip Flexion: 5/5  R Hip Extension: 3+/5  R Hip ABduction: 5/5  R Hip Internal Rotation: 5/5  R Hip External Rotation: 5/5  R Knee Flexion: 5/5  R Knee Extension: 5/5  R Ankle

## 2025-01-22 NOTE — THERAPY DISCHARGE
[facility]  PHYSICAL THERAPY PLAN OF CARE                                    [facility] [place] [address]     Ph: [phone]  Fax: [phone]    [] Certification  [] Recertification []  Plan of Care  [] Progress Note [x] Discharge      Referring Provider: Fahad Myaorga DO    From:  Kayleigh Martínez, PT,DPT    Patient: Anahi Willett (74 y.o. female) : 1950 Date: 2025   Medical Diagnosis: Unsteadiness on feet [R26.81]  Other symptoms and signs involving the musculoskeletal system [R29.898]  Nontraumatic intracranial hemorrhage, unspecified [I62.9]    Treatment Diagnosis: Rt LE Weakness    Plan of Care/Certification Expiration Date: 25   Progress Report Period from:  to 2025    Visits to Date: 5 No Show: 0 Cancelled Appts: 0    OBJECTIVE:     Long Term Goals - Time Frame for Long Term Goals : 6 weeks  Goals Current/ Discharge status Status   Long Term Goal 1: Improve maia Le strength to >/= 4+/5 to improve stability with standing and walking. LTG 1 Current Status:: : see strength  Strength LLE  L Hip Flexion: 4+/5  L Hip Extension: 4-/5  L Hip ABduction: 5/5  L Hip Internal Rotation: 5/5  L Hip External Rotation: 5/5  L Knee Flexion: 5/5  L Knee Extension: 5/5  L Ankle Dorsiflexion: 5/5  Strength RLE  R Hip Flexion: 5/5  R Hip Extension: 3+/5  R Hip ABduction: 5/5  R Hip Internal Rotation: 5/5  R Hip External Rotation: 5/5  R Knee Flexion: 5/5  R Knee Extension: 5/5  R Ankle Dorsiflexion: 5/5  Partially met, Met   Long Term Goal 2: SLS >/= 10sec maia to improve pt's balance and strength. LTG 2 Current Status:: : L 18 sec; R 9 & 7 sec   Partially met   Long Term Goal 3: Pt will be independent with HEP. LTG 3 Current Status:: : reports compliance with HEP, completing daily   Met     Body Structures, Functions, Activity Limitations Requiring Skilled Therapeutic Intervention: Decreased functional mobility , Decreased strength, Decreased balance,

## 2025-01-29 ENCOUNTER — APPOINTMENT (OUTPATIENT)
Dept: PHYSICAL THERAPY | Age: 75
End: 2025-01-29
Payer: MEDICARE

## 2025-02-27 ENCOUNTER — OFFICE VISIT (OUTPATIENT)
Dept: NEUROLOGY | Age: 75
End: 2025-02-27
Payer: MEDICARE

## 2025-02-27 VITALS
HEART RATE: 75 BPM | BODY MASS INDEX: 18.48 KG/M2 | DIASTOLIC BLOOD PRESSURE: 62 MMHG | SYSTOLIC BLOOD PRESSURE: 120 MMHG | WEIGHT: 118 LBS

## 2025-02-27 DIAGNOSIS — I10 ESSENTIAL HYPERTENSION: ICD-10-CM

## 2025-02-27 DIAGNOSIS — Z09 HOSPITAL DISCHARGE FOLLOW-UP: Primary | ICD-10-CM

## 2025-02-27 DIAGNOSIS — S06.350D TRAUMATIC LEFT-SIDED INTRACEREBRAL HEMORRHAGE WITHOUT LOSS OF CONSCIOUSNESS, SUBSEQUENT ENCOUNTER: ICD-10-CM

## 2025-02-27 DIAGNOSIS — R29.898 RIGHT LEG WEAKNESS: ICD-10-CM

## 2025-02-27 PROCEDURE — 3078F DIAST BP <80 MM HG: CPT | Performed by: NURSE PRACTITIONER

## 2025-02-27 PROCEDURE — 1159F MED LIST DOCD IN RCRD: CPT | Performed by: NURSE PRACTITIONER

## 2025-02-27 PROCEDURE — 1125F AMNT PAIN NOTED PAIN PRSNT: CPT | Performed by: NURSE PRACTITIONER

## 2025-02-27 PROCEDURE — 3074F SYST BP LT 130 MM HG: CPT | Performed by: NURSE PRACTITIONER

## 2025-02-27 PROCEDURE — 99204 OFFICE O/P NEW MOD 45 MIN: CPT | Performed by: NURSE PRACTITIONER

## 2025-02-27 PROCEDURE — 1123F ACP DISCUSS/DSCN MKR DOCD: CPT | Performed by: NURSE PRACTITIONER

## 2025-02-27 RX ORDER — SELENIUM 50 MCG
2 TABLET ORAL DAILY
COMMUNITY
Start: 2024-08-27

## 2025-02-27 RX ORDER — LOSARTAN POTASSIUM 100 MG/1
100 TABLET ORAL DAILY
COMMUNITY
Start: 2025-01-17

## 2025-02-27 RX ORDER — ALBUTEROL SULFATE 0.83 MG/ML
2.5 SOLUTION RESPIRATORY (INHALATION) EVERY 6 HOURS PRN
COMMUNITY
Start: 2024-09-19

## 2025-02-27 ASSESSMENT — ENCOUNTER SYMPTOMS
NAUSEA: 0
SHORTNESS OF BREATH: 0
COUGH: 0
WHEEZING: 0
VOMITING: 0
COLOR CHANGE: 0
CHEST TIGHTNESS: 0
TROUBLE SWALLOWING: 0

## 2025-02-27 NOTE — PROGRESS NOTES
facility-administered medications on file prior to visit.         Review of Systems   Constitutional:  Negative for appetite change, chills, fatigue and fever.   HENT:  Negative for hearing loss and trouble swallowing.    Eyes:  Negative for visual disturbance.   Respiratory:  Negative for cough, chest tightness, shortness of breath and wheezing.    Cardiovascular:  Negative for chest pain, palpitations and leg swelling.   Gastrointestinal:  Negative for nausea and vomiting.   Genitourinary:  Negative for difficulty urinating.   Musculoskeletal:  Positive for arthralgias. Negative for gait problem.   Skin:  Negative for color change and rash.   Neurological:  Negative for dizziness, tremors, seizures, syncope, facial asymmetry, speech difficulty, weakness, light-headedness, numbness and headaches.   Psychiatric/Behavioral:  Negative for agitation, confusion and hallucinations. The patient is not nervous/anxious.        Objective:   /62 (Site: Right Upper Arm, Position: Sitting, Cuff Size: Medium Adult)   Pulse 75   Wt 53.5 kg (118 lb)   BMI 18.48 kg/m²     Physical Exam  Vitals and nursing note reviewed.   Constitutional:       General: She is not in acute distress.     Appearance: She is not diaphoretic.   HENT:      Head: Normocephalic and atraumatic.   Eyes:      Extraocular Movements: Extraocular movements intact.      Pupils: Pupils are equal, round, and reactive to light.   Cardiovascular:      Rate and Rhythm: Normal rate and regular rhythm.   Pulmonary:      Effort: Pulmonary effort is normal. No respiratory distress.      Breath sounds: Normal breath sounds.   Skin:     General: Skin is warm and dry.   Neurological:      Mental Status: She is alert and oriented to person, place, and time.      Cranial Nerves: No cranial nerve deficit.      Sensory: No sensory deficit.      Motor: No weakness, tremor, atrophy, abnormal muscle tone or seizure activity.      Coordination: Romberg sign negative.

## 2025-03-18 RX ORDER — LEVETIRACETAM 1000 MG/1
1000 TABLET ORAL 2 TIMES DAILY
Qty: 60 TABLET | Refills: 3 | Status: SHIPPED | OUTPATIENT
Start: 2025-03-18

## 2025-03-18 NOTE — TELEPHONE ENCOUNTER
Requesting medication refill. Please approve or deny this request.    Rx requested:  Requested Prescriptions     Pending Prescriptions Disp Refills    levETIRAcetam (KEPPRA) 1000 MG tablet 60 tablet 3     Sig: Take 1 tablet by mouth 2 times daily         Last Office Visit:   Visit date not found      Next Visit Date:  Future Appointments   Date Time Provider Department Center   7/11/2025 11:00 AM Amando Mistry MD [facility] -               Last refill 12/3/24. Please approve or deny.

## 2025-07-11 ENCOUNTER — OFFICE VISIT (OUTPATIENT)
Age: 75
End: 2025-07-11
Payer: MEDICARE

## 2025-07-11 VITALS
HEART RATE: 68 BPM | BODY MASS INDEX: 17.39 KG/M2 | WEIGHT: 111 LBS | SYSTOLIC BLOOD PRESSURE: 120 MMHG | DIASTOLIC BLOOD PRESSURE: 62 MMHG

## 2025-07-11 DIAGNOSIS — I62.9 INTRACRANIAL HEMORRHAGE (HCC): ICD-10-CM

## 2025-07-11 DIAGNOSIS — R56.9 SEIZURES (HCC): ICD-10-CM

## 2025-07-11 DIAGNOSIS — I61.9 HEMORRHAGIC CEREBROVASCULAR ACCIDENT (CVA) (HCC): Primary | ICD-10-CM

## 2025-07-11 DIAGNOSIS — G93.89 ENCEPHALOMALACIA: ICD-10-CM

## 2025-07-11 DIAGNOSIS — M75.01 ADHESIVE CAPSULITIS OF RIGHT SHOULDER: ICD-10-CM

## 2025-07-11 PROCEDURE — 1123F ACP DISCUSS/DSCN MKR DOCD: CPT | Performed by: PSYCHIATRY & NEUROLOGY

## 2025-07-11 PROCEDURE — 99214 OFFICE O/P EST MOD 30 MIN: CPT | Performed by: PSYCHIATRY & NEUROLOGY

## 2025-07-11 PROCEDURE — 1159F MED LIST DOCD IN RCRD: CPT | Performed by: PSYCHIATRY & NEUROLOGY

## 2025-07-11 PROCEDURE — 3074F SYST BP LT 130 MM HG: CPT | Performed by: PSYCHIATRY & NEUROLOGY

## 2025-07-11 PROCEDURE — 3078F DIAST BP <80 MM HG: CPT | Performed by: PSYCHIATRY & NEUROLOGY

## 2025-07-11 RX ORDER — LEVETIRACETAM 1000 MG/1
1000 TABLET ORAL 2 TIMES DAILY
Qty: 180 TABLET | Refills: 3 | Status: SHIPPED | OUTPATIENT
Start: 2025-07-11

## 2025-07-11 NOTE — PROGRESS NOTES
Subjective:      Patient ID: Anahi Willett is a 75 y.o. female who presents today for:  Chief Complaint   Patient presents with    Follow-up     Wants to discuss quantity of pills for keppra. 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  Patient has leg weakness and hypertension.  Patient is on Keppra 1000 g twice a day.  Patient is postop seizures.  Patient doing well otherwise.    Past Medical History:   Diagnosis Date    Cervical cancer (HCC)     COPD (chronic obstructive pulmonary disease) (HCC)     Hypertension     Osteoarthritis      Past Surgical History:   Procedure Laterality Date    APPENDECTOMY      CARPAL TUNNEL RELEASE Left     HYSTERECTOMY (CERVIX STATUS UNKNOWN)       Social History     Socioeconomic History    Marital status:      Spouse name: Not on file    Number of children: Not on file    Years of education: Not on file    Highest education level: Not on file   Occupational History    Not on file   Tobacco Use    Smoking status: Former     Types: Cigarettes    Smokeless tobacco: Never   Vaping Use    Vaping status: Never Used   Substance and Sexual Activity    Alcohol use: Yes    Drug use: Never    Sexual activity: Not on file   Other Topics Concern    Not on file   Social History Narrative    She is -Lives With: Alone    Type of Home: [address] in [facility]         Home Layout: One level    Home Access: Stairs to enter without rails (in garage; pt does not use front door)-Stairs - Number of Steps: 2    Bathroom Shower/Tub: Tub/Shower unit, Toilet: Handicap height, Equipment: Grab bars in shower, Hand-held shower    Home Equipment: None    Has the patient had two or more falls in the past year or any fall with injury in the past year?: Yes (2 falls- 1st fall pt tripped on her phone , 2nd fall d/t hemmorhage)    ADL Assistance: Independent